# Patient Record
Sex: FEMALE | Race: WHITE | Employment: UNEMPLOYED | RURAL
[De-identification: names, ages, dates, MRNs, and addresses within clinical notes are randomized per-mention and may not be internally consistent; named-entity substitution may affect disease eponyms.]

---

## 2017-01-12 ENCOUNTER — OFFICE VISIT (OUTPATIENT)
Dept: FAMILY MEDICINE CLINIC | Age: 47
End: 2017-01-12

## 2017-01-12 VITALS
WEIGHT: 161 LBS | SYSTOLIC BLOOD PRESSURE: 108 MMHG | BODY MASS INDEX: 28.53 KG/M2 | HEART RATE: 103 BPM | DIASTOLIC BLOOD PRESSURE: 79 MMHG | OXYGEN SATURATION: 98 % | RESPIRATION RATE: 16 BRPM | HEIGHT: 63 IN | TEMPERATURE: 98.6 F

## 2017-01-12 DIAGNOSIS — B95.0 GROUP A STREPTOCOCCAL INFECTION: Primary | ICD-10-CM

## 2017-01-12 DIAGNOSIS — M54.2 CHRONIC NECK PAIN: ICD-10-CM

## 2017-01-12 DIAGNOSIS — G89.29 CHRONIC NECK PAIN: ICD-10-CM

## 2017-01-12 DIAGNOSIS — F41.9 ANXIETY: ICD-10-CM

## 2017-01-12 DIAGNOSIS — R11.0 NAUSEA: ICD-10-CM

## 2017-01-12 DIAGNOSIS — J02.9 SORE THROAT: ICD-10-CM

## 2017-01-12 LAB
S PYO AG THROAT QL: POSITIVE
VALID INTERNAL CONTROL?: YES

## 2017-01-12 RX ORDER — ONDANSETRON 4 MG/1
4 TABLET, ORALLY DISINTEGRATING ORAL
Qty: 20 TAB | Refills: 0 | Status: SHIPPED | OUTPATIENT
Start: 2017-01-12 | End: 2017-03-06 | Stop reason: ALTCHOICE

## 2017-01-12 RX ORDER — AZITHROMYCIN 250 MG/1
TABLET, FILM COATED ORAL
Qty: 6 TAB | Refills: 0 | Status: SHIPPED | OUTPATIENT
Start: 2017-01-12 | End: 2017-01-17

## 2017-01-12 RX ORDER — TRAMADOL HYDROCHLORIDE 50 MG/1
TABLET ORAL
Qty: 90 TAB | Refills: 0 | Status: SHIPPED | OUTPATIENT
Start: 2017-01-12 | End: 2017-03-06 | Stop reason: ALTCHOICE

## 2017-01-12 NOTE — PROGRESS NOTES
Identified pt with two pt identifiers(name and ). Chief Complaint   Patient presents with    Neck Pain    Diarrhea    Medication Evaluation     Wellbutrin        There are no preventive care reminders to display for this patient. Wt Readings from Last 3 Encounters:   17 161 lb (73 kg)   16 160 lb (72.6 kg)   16 158 lb 9.6 oz (71.9 kg)     Temp Readings from Last 3 Encounters:   17 98.6 °F (37 °C) (Oral)   16 97.8 °F (36.6 °C) (Oral)   16 98.4 °F (36.9 °C) (Oral)     BP Readings from Last 3 Encounters:   17 108/79   16 122/82   16 107/73     Pulse Readings from Last 3 Encounters:   17 (!) 103   16 78   16 86         Learning Assessment:  :     Learning Assessment 2014   PRIMARY LEARNER Patient Patient   HIGHEST LEVEL OF EDUCATION - PRIMARY LEARNER  DID NOT GRADUATE HIGH SCHOOL DID NOT GRADUATE HIGH SCHOOL   BARRIERS PRIMARY LEARNER - NONE   CO-LEARNER CAREGIVER - No   PRIMARY LANGUAGE ENGLISH ENGLISH   LEARNER PREFERENCE PRIMARY DEMONSTRATION DEMONSTRATION   ANSWERED BY patient patient    RELATIONSHIP SELF SELF       Depression Screening:  :     PHQ 2 / 9, over the last two weeks 2015   Little interest or pleasure in doing things Not at all   Feeling down, depressed or hopeless Not at all   Total Score PHQ 2 0           Abuse Screening:  :     Abuse Screening Questionnaire 1/15/2016 2014   Do you ever feel afraid of your partner? N N   Are you in a relationship with someone who physically or mentally threatens you? N N   Is it safe for you to go home?  Y Y       Coordination of Care Questionnaire:  :     1) Have you been to an emergency room, urgent care clinic since your last visit? no   Hospitalized since your last visit? no             2) Have you seen or consulted any other health care providers outside of [address] [place] [address] since your last visit? no  (Include any pap smears or colon screenings in this section.)    3) Do you have an Advance Directive on file? no  Are you interested in receiving information about Advance Directives? no    Patient is accompanied by self I have received verbal consent from Fermin Cade to discuss any/all medical information while they are present in the room. Reviewed record in preparation for visit and have obtained necessary documentation. Medication reconciliation up to date and corrected with patient at this time.

## 2017-01-12 NOTE — MR AVS SNAPSHOT
Visit Information Date & Time Provider Department Dept. Phone Encounter #  
 1/12/2017  1:15 PM Patrick Mcclelland [phone] [id number] Follow-up Instructions Return if symptoms worsen or fail to improve. Upcoming Health Maintenance Date Due  
 PAP AKA CERVICAL CYTOLOGY 8/31/2018 DTaP/Tdap/Td series (2 - Td) 8/31/2025 Allergies as of 1/12/2017  Review Complete On: 1/12/2017 By: Tutu Hamm LPN Severity Noted Reaction Type Reactions Erythromycin  01/21/2015    Nausea and Vomiting Vancomycin  12/02/2013    Swelling Current Immunizations  Reviewed on 8/31/2015 Name Date Influenza Vaccine (Quad) PF 11/2/2016 Tdap 8/31/2015 12:12 PM  
  
 Not reviewed this visit You Were Diagnosed With   
  
 Codes Comments Group A streptococcal infection    -  Primary ICD-10-CM: B95.0 ICD-9-CM: 041.01 Chronic neck pain     ICD-10-CM: M54.2, G89.29 ICD-9-CM: 723.1, 338.29 Sore throat     ICD-10-CM: J02.9 ICD-9-CM: 762 Anxiety     ICD-10-CM: F41.9 ICD-9-CM: 300.00 Nausea     ICD-10-CM: R11.0 ICD-9-CM: 787.02 Vitals BP Pulse Temp Resp Height(growth percentile) Weight(growth percentile) 108/79 (!) 103 98.6 °F (37 °C) (Oral) 16 5' 3\" (1.6 m) 161 lb (73 kg) LMP SpO2 BMI OB Status Smoking Status 01/09/2010 98% 28.52 kg/m2 Hysterectomy Never Smoker BMI and BSA Data Body Mass Index Body Surface Area 28.52 kg/m 2 1.8 m 2 Preferred Pharmacy Pharmacy Name Phone [facility]/PHARMACY #[id number] - [facility] [phone] [phone] Your Updated Medication List  
  
   
This list is accurate as of: 1/12/17  1:43 PM.  Always use your most recent med list.  
  
  
  
  
 azithromycin 250 mg tablet Commonly known as:  Tash Calloway Take 2 tablets today, then take 1 tablet daily buPROPion  mg XL tablet Commonly known as:  Eugenio Morrison  
 Take 1 Tab by mouth every morning. cyclobenzaprine 10 mg tablet Commonly known as:  FLEXERIL  
TAKE 1 TABLET BY MOUTH EACH NIGHT AT BEDTIME  
  
 gabapentin 800 mg tablet Commonly known as:  NEURONTIN  
TAKE 1 TABLET BY MOUTH THREE TIMES DAILY  
  
 nortriptyline 25 mg capsule Commonly known as:  PAMELOR  
TAKE 1-2 CAPSULES BY MOUTH AT BEDTIME  
  
 ondansetron 4 mg disintegrating tablet Commonly known as:  ZOFRAN ODT Take 1 Tab by mouth every eight (8) hours as needed for Nausea. SUMAtriptan 100 mg tablet Commonly known as:  IMITREX  
TAKE 1 TABLET BY MOUTH ONCE AS NEEDED FOR MIGRAINE FOR UP TO 9 DOSES  
  
 topiramate 100 mg tablet Commonly known as:  TOPAMAX TAKE 1 TAB BY MOUTH TWO (2) TIMES A DAY. traMADol 50 mg tablet Commonly known as:  ULTRAM  
TAKE 1 TABLET EVERY 8 HOURS AS NEEDED FOR PAIN. MAX DAILY AMOUNT 3 TABS Prescriptions Printed Refills  
 traMADol (ULTRAM) 50 mg tablet 0 Sig: TAKE 1 TABLET EVERY 8 HOURS AS NEEDED FOR PAIN. MAX DAILY AMOUNT 3 TABS Class: Print Prescriptions Sent to Pharmacy Refills  
 azithromycin (ZITHROMAX) 250 mg tablet 0 Sig: Take 2 tablets today, then take 1 tablet daily Class: Normal  
 [place] [id number] [address] Ph #: [phone]  
 ondansetron (ZOFRAN ODT) 4 mg disintegrating tablet 0 Sig: Take 1 Tab by mouth every eight (8) hours as needed for Nausea. Class: Normal  
 [facility] [id number] Ph #: [phone] Route: Oral  
  
We Performed the Following AMB POC RAPID STREP A [40922 CPT(R)] Follow-up Instructions Return if symptoms worsen or fail to improve. Patient Instructions Strep Throat: Care Instructions Your Care Instructions Strep throat is a bacterial infection that causes sudden, severe sore throat and fever.  Strep throat, which is caused by bacteria called streptococcus, is treated with antibiotics. Sometimes a strep test is necessary to tell if the sore throat is caused by strep bacteria. Treatment can help ease symptoms and may prevent future problems. Follow-up care is a key part of your treatment and safety. Be sure to make and go to all appointments, and call your doctor if you are having problems. It's also a good idea to know your test results and keep a list of the medicines you take. How can you care for yourself at home? · Take your antibiotics as directed. Do not stop taking them just because you feel better. You need to take the full course of antibiotics. · Strep throat can spread to others until 24 hours after you begin taking antibiotics. During this time, you should avoid contact with other people at work or home, especially infants and children. Do not sneeze or cough on others, and wash your hands often. Keep your drinking glass and eating utensils separate from those of others, and wash these items well in hot, soapy water. · Gargle with warm salt water at least once each hour to help reduce swelling and make your throat feel better. Use 1 teaspoon of salt mixed in 8 fluid ounces of warm water. · Take an over-the-counter pain medication, such as acetaminophen (Tylenol), ibuprofen (Advil, Motrin), or naproxen (Aleve). Read and follow all instructions on the label. · Try an over-the-counter anesthetic throat spray or throat lozenges, which may help relieve throat pain. · Drink plenty of fluids. Fluids may help soothe an irritated throat. Hot fluids, such as tea or soup, may help your throat feel better. · Eat soft solids and drink plenty of clear liquids. Flavored ice pops, ice cream, scrambled eggs, sherbet, and gelatin dessert (such as Jell-O) may also soothe the throat. · Get lots of rest. 
· Do not smoke, and avoid secondhand smoke. If you need help quitting, talk to your doctor about stop-smoking programs and medicines.  These can increase your chances of quitting for good. · Use a vaporizer or humidifier to add moisture to the air in your bedroom. Follow the directions for cleaning the machine. When should you call for help? Call your doctor now or seek immediate medical care if: 
· You have a new or higher fever. · You have a fever with a stiff neck or severe headache. · You have new or worse trouble swallowing. · Your sore throat gets much worse on one side. · Your pain becomes much worse on one side of your throat. Watch closely for changes in your health, and be sure to contact your doctor if: 
· You are not getting better after 2 days (48 hours). · You do not get better as expected. Where can you learn more? Go to http://smooth-connie.info/. Enter K625 in the search box to learn more about \"Strep Throat: Care Instructions. \" Current as of: July 29, 2016 Content Version: 11.1 © 5316-0832 [employer]. Care instructions adapted under license by [employer] (which disclaims liability or warranty for this information). If you have questions about a medical condition or this instruction, always ask your healthcare professional. [place] [address] any warranty or liability for your use of this information. Introducing [facility]! Toi Kidd introduces [employer] patient portal. Now you can access parts of your medical record, email your doctor's office, and request medication refills online. 1. In your internet browser, go to [email or web address] 2. Click on the First Time User? Click Here link in the Sign In box. You will see the New Member Sign Up page. 3. Enter your [employer] Access Code exactly as it appears below. You will not need to use this code after youve completed the sign-up process. If you do not sign up before the expiration date, you must request a new code.  
 
· [employer] Access Code: [email or web address] 
 Expires: 1/31/2017  1:07 PM 
 
4. Enter the last four digits of your Social Security Number (xxxx) and Date of Birth (mm/dd/yyyy) as indicated and click Submit. You will be taken to the next sign-up page. 5. Create a THREAT STREAM ID. This will be your THREAT STREAM login ID and cannot be changed, so think of one that is secure and easy to remember. 6. Create a THREAT STREAM password. You can change your password at any time. 7. Enter your Password Reset Question and Answer. This can be used at a later time if you forget your password. 8. Enter your e-mail address. You will receive e-mail notification when new information is available in [address] 9. Click Sign Up. You can now view and download portions of your medical record. 10. Click the Download Summary menu link to download a portable copy of your medical information. If you have questions, please visit the Frequently Asked Questions section of the THREAT STREAM website. Remember, THREAT STREAM is NOT to be used for urgent needs. For medical emergencies, dial 911. Now available from your iPhone and Android! Please provide this summary of care documentation to your next provider. Your primary care clinician is listed as Makenzie Espinal. If you have any questions after today's visit, please call [phone].

## 2017-01-12 NOTE — PROGRESS NOTES
Subjective:      Lc Rojas is a 42 y.o. female here with complaint of abdominal discomfort, diarrhea, nausea for the past few days. No associated fever, chills, raw/undercooked food ingestion. Daughters currently with strep pharyngitis. She has been taking Zofran for the nausea with some improvement. Inquires about increasing her dose of Wellbutrin or adding another medication. Reports that her anxiety symptoms have been worsening. Feels a sense of being overwhelmed and shaky. A lot has been going on at home and with her 's health. Reports that Wellbutrin has been effective previously but may need a boost. She has previously been on Zoloft therapy. Requests refill for tramadol which is prescribed for chronic neck pain. Current Outpatient Prescriptions   Medication Sig Dispense Refill    cyclobenzaprine (FLEXERIL) 10 mg tablet TAKE 1 TABLET BY MOUTH EACH NIGHT AT BEDTIME 30 Tab 3    buPROPion XL (WELLBUTRIN XL) 300 mg XL tablet Take 1 Tab by mouth every morning. 30 Tab 5    gabapentin (NEURONTIN) 800 mg tablet TAKE 1 TABLET BY MOUTH THREE TIMES DAILY 90 Tab 5    SUMAtriptan (IMITREX) 100 mg tablet TAKE 1 TABLET BY MOUTH ONCE AS NEEDED FOR MIGRAINE FOR UP TO 9 DOSES 9 Tab 5    topiramate (TOPAMAX) 100 mg tablet TAKE 1 TAB BY MOUTH TWO (2) TIMES A DAY. 60 Tab 5    nortriptyline (PAMELOR) 25 mg capsule TAKE 1-2 CAPSULES BY MOUTH AT BEDTIME 60 Cap 5    traMADol (ULTRAM) 50 mg tablet TAKE 1 TABLET EVERY 8 HOURS AS NEEDED FOR PAIN.  MAX DAILY AMOUNT 3 TABS 90 Tab 0       Allergies   Allergen Reactions    Erythromycin Nausea and Vomiting    Vancomycin Swelling       Past Medical History   Diagnosis Date    Cervicalgia     Chronic neck pain 4/3/2013    Migraine with aura, without mention of intractable migraine without mention of status migrainosus     Moderate episode of recurrent major depressive disorder ([facility] [address] 11/2/2016       Social History   Substance Use Topics    Smoking status: Never Smoker    Smokeless tobacco: Never Used    Alcohol use No        Review of Systems  Pertinent items are noted in HPI. Objective:     Visit Vitals    /79    Pulse (!) 103    Temp 98.6 °F (37 °C) (Oral)    Resp 16    Ht 5' 3\" (1.6 m)    Wt 161 lb (73 kg)    LMP 01/09/2010    SpO2 98%    BMI 28.52 kg/m2      General appearance - alert, mildly ill appearing, no acute distress   Eyes - pupils equal and reactive, extraocular eye movements intact  Oropharyngx - mucous membranes moist, pharynx normal without lesions and erythematous  Neck - supple, no significant adenopathy  Chest - clear to auscultation, no wheezes, rales or rhonchi, symmetric air entry, no tachypnea, retractions or cyanosis  Heart - normal rate, regular rhythm, normal S1, S2, no murmurs, rubs, clicks or gallops  Abdomen - soft, nontender, nondistended, no masses or organomegaly  Neurological - alert, oriented, normal speech, no focal findings or movement disorder noted    Assessment/Plan:   Lc Rojas is a 42 y.o. female seen for:     1. Group A streptococcal infection: rapid GAS testing positive. Treat as below. - azithromycin (ZITHROMAX) 250 mg tablet; Take 2 tablets today, then take 1 tablet daily  Dispense: 6 Tab; Refill: 0  - warm salt water gargles, push fluids, OTC analgesic of choice for pain   - change out toothbrush in 48 hours    2. Chronic neck pain: medication refilled. - traMADol (ULTRAM) 50 mg tablet; TAKE 1 TABLET EVERY 8 HOURS AS NEEDED FOR PAIN. MAX DAILY AMOUNT 3 TABS  Dispense: 90 Tab; Refill: 0    3. Sore throat  - AMB POC RAPID STREP A    4. Anxiety: discussed adding another medication, but will research what bests augments Wellbutrin therapy and discuss with her at a later time. She is in agreement with this. 5. Nausea  - ondansetron (ZOFRAN ODT) 4 mg disintegrating tablet; Take 1 Tab by mouth every eight (8) hours as needed for Nausea. Dispense: 20 Tab;  Refill: 0    I have discussed the diagnosis with the patient and the intended plan as seen in the above orders. The patient has received an after-visit summary and questions were answered concerning future plans. I have discussed medication side effects and warnings with the patient as well. Patient verbalizes understanding of plan of care and denies further questions or concerns at this time. Informed patient to return to the office if symptoms worsen or if new symptoms arise. Follow-up Disposition:  Return if symptoms worsen or fail to improve.

## 2017-01-12 NOTE — PATIENT INSTRUCTIONS

## 2017-01-19 ENCOUNTER — TELEPHONE (OUTPATIENT)
Dept: FAMILY MEDICINE CLINIC | Age: 47
End: 2017-01-19

## 2017-01-19 RX ORDER — CITALOPRAM 20 MG/1
20 TABLET, FILM COATED ORAL DAILY
Qty: 30 TAB | Refills: 1 | Status: SHIPPED | OUTPATIENT
Start: 2017-01-19 | End: 2017-03-06 | Stop reason: SINTOL

## 2017-01-19 NOTE — TELEPHONE ENCOUNTER
Pt is requesting a phone call from Dr. Freda Avila regarding a medication that she states was talked about in her visit on Monday 1/16/17

## 2017-01-19 NOTE — TELEPHONE ENCOUNTER
Patient called. Discussed augmenting her Wellbutrin therapy with another medication as her anxiety was not well controlled. My research has shown that SSRIs can be beneficial with Wellbutrin therapy. Will start citalopram 20 mg daily and advised to follow up in 1 month. She verbalizes understanding. Requested Prescriptions     Signed Prescriptions Disp Refills    citalopram (CELEXA) 20 mg tablet 30 Tab 1     Sig: Take 1 Tab by mouth daily.      Authorizing Provider: Jeanne Merida

## 2017-02-07 RX ORDER — NORTRIPTYLINE HYDROCHLORIDE 25 MG/1
CAPSULE ORAL
Qty: 60 CAP | Refills: 5 | Status: SHIPPED | OUTPATIENT
Start: 2017-02-07 | End: 2017-09-24 | Stop reason: SDUPTHER

## 2017-02-21 DIAGNOSIS — G43.509 PERSISTENT MIGRAINE AURA WITHOUT CEREBRAL INFARCTION AND WITHOUT STATUS MIGRAINOSUS, NOT INTRACTABLE: ICD-10-CM

## 2017-02-22 RX ORDER — SUMATRIPTAN 100 MG/1
TABLET, FILM COATED ORAL
Qty: 9 TAB | Refills: 5 | Status: SHIPPED | OUTPATIENT
Start: 2017-02-22 | End: 2018-01-31 | Stop reason: SDUPTHER

## 2017-02-24 RX ORDER — TOPIRAMATE 100 MG/1
TABLET, FILM COATED ORAL
Qty: 60 TAB | Refills: 5 | Status: SHIPPED | OUTPATIENT
Start: 2017-02-24 | End: 2017-10-29 | Stop reason: SDUPTHER

## 2017-03-06 ENCOUNTER — OFFICE VISIT (OUTPATIENT)
Dept: FAMILY MEDICINE CLINIC | Age: 47
End: 2017-03-06

## 2017-03-06 VITALS
TEMPERATURE: 98.5 F | SYSTOLIC BLOOD PRESSURE: 95 MMHG | HEART RATE: 105 BPM | WEIGHT: 164.8 LBS | HEIGHT: 63 IN | DIASTOLIC BLOOD PRESSURE: 66 MMHG | BODY MASS INDEX: 29.2 KG/M2 | RESPIRATION RATE: 20 BRPM | OXYGEN SATURATION: 97 %

## 2017-03-06 DIAGNOSIS — Z91.09 ENVIRONMENTAL ALLERGIES: ICD-10-CM

## 2017-03-06 DIAGNOSIS — N64.4 BREAST PAIN, LEFT: ICD-10-CM

## 2017-03-06 DIAGNOSIS — R51.9 HEADACHE, UNSPECIFIED HEADACHE TYPE: Primary | ICD-10-CM

## 2017-03-06 DIAGNOSIS — H93.13 TINNITUS, BILATERAL: ICD-10-CM

## 2017-03-06 RX ORDER — CETIRIZINE HCL 10 MG
TABLET ORAL
COMMUNITY
End: 2017-03-06 | Stop reason: SDUPTHER

## 2017-03-06 RX ORDER — CETIRIZINE HCL 10 MG
10 TABLET ORAL DAILY
Qty: 30 TAB | Refills: 3 | Status: SHIPPED | OUTPATIENT
Start: 2017-03-06 | End: 2019-05-16 | Stop reason: ALTCHOICE

## 2017-03-06 RX ORDER — BUTALBITAL, ACETAMINOPHEN AND CAFFEINE 50; 325; 40 MG/1; MG/1; MG/1
1 TABLET ORAL
Qty: 30 TAB | Refills: 0 | Status: SHIPPED | OUTPATIENT
Start: 2017-03-06 | End: 2017-03-24 | Stop reason: SDUPTHER

## 2017-03-06 RX ORDER — FLUTICASONE PROPIONATE 50 MCG
SPRAY, SUSPENSION (ML) NASAL
Qty: 1 BOTTLE | Refills: 5 | Status: SHIPPED | OUTPATIENT
Start: 2017-03-06 | End: 2017-11-20

## 2017-03-06 NOTE — PROGRESS NOTES
HISTORY OF PRESENT ILLNESS  Bryon Cross is a 42 y.o. female. HPI  FU frequent headaches. Going thru Imitrex with minimal relief. Taking Topamax for prevention. Sinus drainage x 2 weeks. Also C/O bilat ringing in ears x 2-3 months, constant. Has been to [facility]. Hearing test done in Feb showed some hearing loss and offered hearing aids only. Also left breast pain since last year. mammo done at [facility] last April 26, 2017 - normal.    Review of Systems   HENT: Positive for tinnitus. Neurological: Positive for headaches. All other systems reviewed and are negative. Visit Vitals    BP 95/66 (BP 1 Location: Left arm, BP Patient Position: Sitting)    Pulse (!) 105    Temp 98.5 °F (36.9 °C) (Oral)    Resp 20    Ht 5' 3\" (1.6 m)    Wt 164 lb 12.8 oz (74.8 kg)    LMP 01/09/2010    SpO2 97%    BMI 29.19 kg/m2       Physical Exam   Constitutional: She appears well-developed and well-nourished. HENT:   Right Ear: External ear normal.   Left Ear: External ear normal.   Mouth/Throat: Oropharynx is clear and moist.   Eyes: Conjunctivae are normal.   Neck: Neck supple. Cardiovascular: Normal rate and normal heart sounds. Pulmonary/Chest: Effort normal and breath sounds normal. Left breast exhibits tenderness. Left breast exhibits no mass. Vitals reviewed. ASSESSMENT and PLAN    ICD-10-CM ICD-9-CM    1. Headache, unspecified headache type R51 784.0 butalbital-acetaminophen-caffeine (FIORICET, ESGIC) -40 mg per tablet   2. Tinnitus, bilateral H93.13 388.30    3. Environmental allergies Z91.09 V15.09 cetirizine (ZYRTEC) 10 mg tablet      fluticasone (FLONASE ALLERGY RELIEF) 50 mcg/actuation nasal spray   4. Breast pain, left N64.4 611.71 VAZQUEZ MAMMO LT DX INCL CAD      US BREAST LT COMPLETE 4 QUAD     Treat allergies. RX Fioricet PRN headaches. Order breast imaging.

## 2017-03-06 NOTE — PROGRESS NOTES
Identified pt with two pt identifiers(name and ). Chief Complaint   Patient presents with    Headache     come and go - won't go away     Ringing in Ear     been there a few months and now high pitched and pressure - both ears    Mass     left side lump - had mammogram and it was ok         There are no preventive care reminders to display for this patient. Wt Readings from Last 3 Encounters:   17 164 lb 12.8 oz (74.8 kg)   17 161 lb (73 kg)   16 160 lb (72.6 kg)     Temp Readings from Last 3 Encounters:   17 98.5 °F (36.9 °C) (Oral)   17 98.6 °F (37 °C) (Oral)   16 97.8 °F (36.6 °C) (Oral)     BP Readings from Last 3 Encounters:   17 95/66   17 108/79   16 122/82     Pulse Readings from Last 3 Encounters:   17 (!) 105   17 (!) 103   16 78         Learning Assessment:  :     Learning Assessment 2014   PRIMARY LEARNER Patient Patient   HIGHEST LEVEL OF EDUCATION - PRIMARY LEARNER  DID NOT GRADUATE HIGH SCHOOL DID NOT GRADUATE HIGH SCHOOL   BARRIERS PRIMARY LEARNER - NONE   CO-LEARNER CAREGIVER - No   PRIMARY LANGUAGE ENGLISH ENGLISH   LEARNER PREFERENCE PRIMARY DEMONSTRATION DEMONSTRATION   ANSWERED BY patient patient    RELATIONSHIP SELF SELF       Depression Screening:  :     PHQ 2 / 9, over the last two weeks 2015   Little interest or pleasure in doing things Not at all   Feeling down, depressed or hopeless Not at all   Total Score PHQ 2 0       Fall Risk Assessment:  :     Fall Risk Assessment, last 12 mths 3/6/2017   Able to walk? Yes   Fall in past 12 months? No       Abuse Screening:  :     Abuse Screening Questionnaire 3/6/2017 1/15/2016 2014   Do you ever feel afraid of your partner? N N N   Are you in a relationship with someone who physically or mentally threatens you? N N N   Is it safe for you to go home?  Raisa Monteiro       Coordination of Care Questionnaire:  :     1) Have you been to an emergency room, urgent care clinic since your last visit? no   Hospitalized since your last visit? no             2) Have you seen or consulted any other health care providers outside of [address] [place] [address] since your last visit? Yes ENT 2/2/17  (Include any pap smears or colon screenings in this section.)    3) Do you have an Advance Directive on file? no  Are you interested in receiving information about Advance Directives? no    Patient is accompanied by daughter I have received verbal consent from Juli Amaral to discuss any/all medical information while they are present in the room. Reviewed record in preparation for visit and have obtained necessary documentation. Medication reconciliation up to date and corrected with patient at this time.

## 2017-03-06 NOTE — PATIENT INSTRUCTIONS
Seasonal Allergies: Care Instructions  Your Care Instructions  Allergies occur when your body's defense system (immune system) overreacts to certain substances. The immune system treats a harmless substance as if it were a harmful germ or virus. Many things can cause this to happen. Examples include pollens, medicine, food, dust, animal dander, and mold. Your allergies are seasonal if you have symptoms just at certain times of the year. In that case, you are probably allergic to pollens from certain trees, grasses, or weeds. Allergies can be mild or severe. Over-the-counter allergy medicine may help with some symptoms. Read and follow all instructions on the label. Managing your allergies is an important part of staying healthy. Your doctor may suggest that you have tests to help find the cause of your allergies. When you know what things trigger your symptoms, you can avoid them. This can prevent allergy symptoms and other health problems. In some cases, immunotherapy might help. For this treatment, you get shots or use pills that have a small amount of certain allergens in them. Your body \"gets used to\" the allergen, so you react less to it over time. This kind of treatment may help prevent or reduce some allergy symptoms. Follow-up care is a key part of your treatment and safety. Be sure to make and go to all appointments, and call your doctor if you are having problems. It's also a good idea to know your test results and keep a list of the medicines you take. How can you care for yourself at home? · Be safe with medicines. Take your medicines exactly as prescribed. Call your doctor if you think you are having a problem with your medicine. · During your allergy season, keep windows closed. If you need to use air-conditioning, change or clean all filters every month. Take a shower and change your clothes after you have been outside. · Stay inside when pollen counts are high.  Vacuum once or twice a week. Use a vacuum  with a HEPA filter or a double-thickness filter. When should you call for help? Call 911 anytime you think you may need emergency care. For example, call if:  · You have symptoms of a severe allergic reaction. These may include:  ¨ Sudden raised, red areas (hives) all over your body. ¨ Swelling of the throat, mouth, lips, or tongue. ¨ Trouble breathing. ¨ Passing out (losing consciousness). Or you may feel very lightheaded or suddenly feel weak, confused, or restless. Watch closely for changes in your health, and be sure to contact your doctor if:  · You need help controlling your allergies. · You have questions about allergy testing. · You do not get better as expected. Where can you learn more? Go to http://smooth-connie.info/. Enter J912 in the search box to learn more about \"Seasonal Allergies: Care Instructions. \"  Current as of: February 12, 2016  Content Version: 11.1  © 7995-0834 [employer]. Care instructions adapted under license by [employer] (which disclaims liability or warranty for this information). If you have questions about a medical condition or this instruction, always ask your healthcare professional. [address] any warranty or liability for your use of this information. Plan to wean off Citalopram to see if relieve tinnitus.

## 2017-03-06 NOTE — MR AVS SNAPSHOT
Visit Information Date & Time Provider Department Dept. Phone Encounter #  
 2/5/8942  3:35 PM Cyn Hodges Patrick Juels [phone] [id number] Upcoming Health Maintenance Date Due  
 PAP AKA CERVICAL CYTOLOGY 8/31/2018 DTaP/Tdap/Td series (2 - Td) 8/31/2025 Allergies as of 3/6/2017  Review Complete On: 1/1/9269 By: Cyn Hodges MD  
  
 Severity Noted Reaction Type Reactions Erythromycin  01/21/2015    Nausea and Vomiting Vancomycin  12/02/2013    Swelling Current Immunizations  Reviewed on 8/31/2015 Name Date Influenza Vaccine (Quad) PF 11/2/2016 Tdap 8/31/2015 12:12 PM  
  
 Not reviewed this visit You Were Diagnosed With   
  
 Codes Comments Headache, unspecified headache type    -  Primary ICD-10-CM: R51 ICD-9-CM: 784.0 Tinnitus, bilateral     ICD-10-CM: H93.13 
ICD-9-CM: 388.30 Environmental allergies     ICD-10-CM: Z91.09 
ICD-9-CM: V15.09 Breast pain, left     ICD-10-CM: N64.4 ICD-9-CM: 611.71 Vitals BP Pulse Temp Resp Height(growth percentile) Weight(growth percentile) 95/66 (BP 1 Location: Left arm, BP Patient Position: Sitting) (!) 105 98.5 °F (36.9 °C) (Oral) 20 5' 3\" (1.6 m) 164 lb 12.8 oz (74.8 kg) LMP SpO2 BMI OB Status Smoking Status 01/09/2010 97% 29.19 kg/m2 Hysterectomy Never Smoker Vitals History BMI and BSA Data Body Mass Index Body Surface Area  
 29.19 kg/m 2 1.82 m 2 Preferred Pharmacy Pharmacy Name Phone [id number] [place] - [address] [facility] [address] [facility] [address] [phone] Your Updated Medication List  
  
   
This list is accurate as of: 3/6/17  5:03 PM.  Always use your most recent med list.  
  
  
  
  
 buPROPion  mg XL tablet Commonly known as:  Mary Salm Take 1 Tab by mouth every morning. butalbital-acetaminophen-caffeine -40 mg per tablet Commonly known as:  Doug Guajardo  
 Take 1 Tab by mouth every six (6) hours as needed for Headache. Max Daily Amount: 4 Tabs. cetirizine 10 mg tablet Commonly known as:  ZyrTEC Take 1 Tab by mouth daily. cyclobenzaprine 10 mg tablet Commonly known as:  FLEXERIL  
TAKE 1 TABLET BY MOUTH EACH NIGHT AT BEDTIME  
  
 fluticasone 50 mcg/actuation nasal spray Commonly known as:  FLONASE ALLERGY RELIEF  
2 sprays per nostril once a day  
  
 gabapentin 800 mg tablet Commonly known as:  NEURONTIN  
TAKE 1 TABLET BY MOUTH THREE TIMES DAILY  
  
 nortriptyline 25 mg capsule Commonly known as:  PAMELOR  
TAKE 1-2 CAPSULES BY MOUTH AT BEDTIME  
  
 SUMAtriptan 100 mg tablet Commonly known as:  IMITREX  
TAKE 1 TABLET BY MOUTH ONCE AS NEEDED FOR MIGRAINE FOR UP TO 9 DOSES  
  
 topiramate 100 mg tablet Commonly known as:  TOPAMAX TAKE 1 TAB BY MOUTH TWO (2) TIMES A DAY. Prescriptions Printed Refills  
 butalbital-acetaminophen-caffeine (FIORICET, ESGIC) -40 mg per tablet 0 Sig: Take 1 Tab by mouth every six (6) hours as needed for Headache. Max Daily Amount: 4 Tabs. Class: Print Route: Oral  
  
Prescriptions Sent to Pharmacy Refills  
 cetirizine (ZYRTEC) 10 mg tablet 3 Sig: Take 1 Tab by mouth daily. Class: Normal  
 Pharmacy: [facility] #[id number] - [place] [address] Ph #: [phone] Route: Oral  
 fluticasone (FLONASE ALLERGY RELIEF) 50 mcg/actuation nasal spray 5 Si sprays per nostril once a day Class: Normal  
 Pharmacy: [facility]/pharmacy #[id number] - [place] [address] Ph #: [phone] To-Do List   
 2017 Imaging:  VAZQUEZ MAMMO LT DX INCL CAD   
  
 2017 Imaging:  US BREAST LT COMPLETE 4 QUAD Patient Instructions Seasonal Allergies: Care Instructions Your Care Instructions Allergies occur when your body's defense system (immune system) overreacts to certain substances. The immune system treats a harmless substance as if it were a harmful germ or virus. Many things can cause this to happen. Examples include pollens, medicine, food, dust, animal dander, and mold. Your allergies are seasonal if you have symptoms just at certain times of the year. In that case, you are probably allergic to pollens from certain trees, grasses, or weeds. Allergies can be mild or severe. Over-the-counter allergy medicine may help with some symptoms. Read and follow all instructions on the label. Managing your allergies is an important part of staying healthy. Your doctor may suggest that you have tests to help find the cause of your allergies. When you know what things trigger your symptoms, you can avoid them. This can prevent allergy symptoms and other health problems. In some cases, immunotherapy might help. For this treatment, you get shots or use pills that have a small amount of certain allergens in them. Your body \"gets used to\" the allergen, so you react less to it over time. This kind of treatment may help prevent or reduce some allergy symptoms. Follow-up care is a key part of your treatment and safety. Be sure to make and go to all appointments, and call your doctor if you are having problems. It's also a good idea to know your test results and keep a list of the medicines you take. How can you care for yourself at home? · Be safe with medicines. Take your medicines exactly as prescribed. Call your doctor if you think you are having a problem with your medicine. · During your allergy season, keep windows closed. If you need to use air-conditioning, change or clean all filters every month. Take a shower and change your clothes after you have been outside. · Stay inside when pollen counts are high. Vacuum once or twice a week. Use a vacuum  with a HEPA filter or a double-thickness filter. When should you call for help? Call 911 anytime you think you may need emergency care. For example, call if: 
· You have symptoms of a severe allergic reaction. These may include: 
¨ Sudden raised, red areas (hives) all over your body. ¨ Swelling of the throat, mouth, lips, or tongue. ¨ Trouble breathing. ¨ Passing out (losing consciousness). Or you may feel very lightheaded or suddenly feel weak, confused, or restless. Watch closely for changes in your health, and be sure to contact your doctor if: 
· You need help controlling your allergies. · You have questions about allergy testing. · You do not get better as expected. Where can you learn more? Go to http://smoothGladitoodconnie.info/. Enter J912 in the search box to learn more about \"Seasonal Allergies: Care Instructions. \" Current as of: February 12, 2016 Content Version: 11.1 © 2476-6172 [employer]. Care instructions adapted under license by [employer] (which disclaims liability or warranty for this information). If you have questions about a medical condition or this instruction, always ask your healthcare professional. [place] [address] any warranty or liability for your use of this information. Plan to wean off Citalopram to see if relieve tinnitus. Introducing [facility]! [address] introduces [email or web address] patient portal. Now you can access parts of your medical record, email your doctor's office, and request medication refills online. 1. In your internet browser, go to [email or web address] 2. Click on the First Time User? Click Here link in the Sign In box. You will see the New Member Sign Up page. 3. Enter your [email or web address] Access Code exactly as it appears below. You will not need to use this code after youve completed the sign-up process. If you do not sign up before the expiration date, you must request a new code. · [email or web address] Access Code: [email or web address] Expires: 6/4/2017  4:55 PM 
 
 4. Enter the last four digits of your Social Security Number (xxxx) and Date of Birth (mm/dd/yyyy) as indicated and click Submit. You will be taken to the next sign-up page. 5. Create a Narrato ID. This will be your Narrato login ID and cannot be changed, so think of one that is secure and easy to remember. 6. Create a Narrato password. You can change your password at any time. 7. Enter your Password Reset Question and Answer. This can be used at a later time if you forget your password. 8. Enter your e-mail address. You will receive e-mail notification when new information is available in [address] 9. Click Sign Up. You can now view and download portions of your medical record. 10. Click the Download Summary menu link to download a portable copy of your medical information. If you have questions, please visit the Frequently Asked Questions section of the Narrato website. Remember, Narrato is NOT to be used for urgent needs. For medical emergencies, dial 911. Now available from your iPhone and Android! Please provide this summary of care documentation to your next provider. Your primary care clinician is listed as Brandi Huffman. If you have any questions after today's visit, please call [phone].

## 2017-03-09 ENCOUNTER — HOSPITAL ENCOUNTER (OUTPATIENT)
Dept: MAMMOGRAPHY | Age: 47
Discharge: HOME | End: 2017-03-09
Attending: FAMILY MEDICINE
Payer: MEDICAID

## 2017-03-09 DIAGNOSIS — N64.4 BREAST PAIN, LEFT: ICD-10-CM

## 2017-03-09 PROCEDURE — 77067 SCR MAMMO BI INCL CAD: CPT

## 2017-03-14 ENCOUNTER — APPOINTMENT (OUTPATIENT)
Dept: CT IMAGING | Age: 47
End: 2017-03-14
Attending: EMERGENCY MEDICINE
Payer: MEDICAID

## 2017-03-14 ENCOUNTER — HOSPITAL ENCOUNTER (EMERGENCY)
Age: 47
Discharge: HOME | End: 2017-03-14
Attending: EMERGENCY MEDICINE
Payer: MEDICAID

## 2017-03-14 VITALS
HEIGHT: 63 IN | TEMPERATURE: 97.6 F | HEART RATE: 72 BPM | DIASTOLIC BLOOD PRESSURE: 66 MMHG | SYSTOLIC BLOOD PRESSURE: 103 MMHG | OXYGEN SATURATION: 100 % | WEIGHT: 164 LBS | BODY MASS INDEX: 29.06 KG/M2 | RESPIRATION RATE: 17 BRPM

## 2017-03-14 DIAGNOSIS — G43.809 OTHER MIGRAINE WITHOUT STATUS MIGRAINOSUS, NOT INTRACTABLE: Primary | ICD-10-CM

## 2017-03-14 DIAGNOSIS — M62.830 SPASM OF MUSCLE, BACK: ICD-10-CM

## 2017-03-14 PROCEDURE — 96361 HYDRATE IV INFUSION ADD-ON: CPT

## 2017-03-14 PROCEDURE — 96375 TX/PRO/DX INJ NEW DRUG ADDON: CPT

## 2017-03-14 PROCEDURE — 96374 THER/PROPH/DIAG INJ IV PUSH: CPT

## 2017-03-14 PROCEDURE — 74011250636 HC RX REV CODE- 250/636: Performed by: EMERGENCY MEDICINE

## 2017-03-14 PROCEDURE — 70450 CT HEAD/BRAIN W/O DYE: CPT

## 2017-03-14 PROCEDURE — 99283 EMERGENCY DEPT VISIT LOW MDM: CPT

## 2017-03-14 RX ORDER — DEXAMETHASONE SODIUM PHOSPHATE 4 MG/ML
10 INJECTION, SOLUTION INTRA-ARTICULAR; INTRALESIONAL; INTRAMUSCULAR; INTRAVENOUS; SOFT TISSUE
Status: COMPLETED | OUTPATIENT
Start: 2017-03-14 | End: 2017-03-14

## 2017-03-14 RX ORDER — DIPHENHYDRAMINE HYDROCHLORIDE 50 MG/ML
25 INJECTION, SOLUTION INTRAMUSCULAR; INTRAVENOUS
Status: COMPLETED | OUTPATIENT
Start: 2017-03-14 | End: 2017-03-14

## 2017-03-14 RX ORDER — PROCHLORPERAZINE EDISYLATE 5 MG/ML
10 INJECTION INTRAMUSCULAR; INTRAVENOUS
Status: COMPLETED | OUTPATIENT
Start: 2017-03-14 | End: 2017-03-14

## 2017-03-14 RX ORDER — KETOROLAC TROMETHAMINE 30 MG/ML
30 INJECTION, SOLUTION INTRAMUSCULAR; INTRAVENOUS
Status: COMPLETED | OUTPATIENT
Start: 2017-03-14 | End: 2017-03-14

## 2017-03-14 RX ADMIN — KETOROLAC TROMETHAMINE 30 MG: 30 INJECTION, SOLUTION INTRAMUSCULAR at 15:54

## 2017-03-14 RX ADMIN — DEXAMETHASONE SODIUM PHOSPHATE 10 MG: 4 INJECTION, SOLUTION INTRAMUSCULAR; INTRAVENOUS at 15:24

## 2017-03-14 RX ADMIN — PROCHLORPERAZINE EDISYLATE 10 MG: 5 INJECTION INTRAMUSCULAR; INTRAVENOUS at 15:27

## 2017-03-14 RX ADMIN — SODIUM CHLORIDE 2000 ML: 900 INJECTION, SOLUTION INTRAVENOUS at 15:25

## 2017-03-14 RX ADMIN — DIPHENHYDRAMINE HYDROCHLORIDE 25 MG: 50 INJECTION, SOLUTION INTRAMUSCULAR; INTRAVENOUS at 15:27

## 2017-03-14 NOTE — PROGRESS NOTES
3:40 PM  Change of shift. Care of patient taken over from Dr Jaclyn Carnes; H&P reviewed, handoff complete. Pt just given migraine cocktail/ awaiting HA resoution/ pt has a Neurologist she see's;     4:22 PM 'HA better now'; pt sleeping/ awakens to voice/ agrees w/ Neurology follow-up; 'has meds at home'; will discharge once IVF completed; Fallon Pearson's  results have been reviewed with her. She has been counseled regarding her diagnosis. She verbally conveys understanding and agreement of the signs, symptoms, diagnosis, treatment and prognosis and additionally agrees to Call/ Arrange follow up as recommended with Dr. Emilia Darnell MD in 24 - 48 hours. She also agrees with the care-plan and conveys that all of her questions have been answered. I have also put together some discharge instructions for her that include: 1) educational information regarding their diagnosis, 2) how to care for their diagnosis at home, as well a 3) list of reasons why they would want to return to the ED prior to their follow-up appointment, should their condition change or for concerns.

## 2017-03-14 NOTE — ED PROVIDER NOTES
HPI Comments: 42 y.o. female with past medical history significant for migraine, cervicalgia, and chronic neck pain who presents with chief complaint of HA. For the past 3 days, patient has complained of worsening HA with associated sensitivity to lights and sounds. 2 days ago, patient also developed nausea/vomiting. While HA is generalized, pain presents more acutely at the \"sides\" -- \"it's like this pulsating, explosive pain. \"  Patient mentions h/o migraine HA's, but \"has never had it like this before. \"  Patient has had mild relief with Ibuprofen and Zofran -- denies any improvement with Imitrex. Patient last took Ibuprofen and Zofran early this morning. Patient was recently seen by her PCP 8 days ago for constant, mild dull HA -- \"they thought it was allergies. \"  Patient had been taking Zyrtec with some relief. Patient additionally mentions intermittent ear pain and tinnitus for the past month -- reports recent ENT f/u. Patient denies any fever or decreased visual acuity. There are no other acute medical concerns at this time. Social hx: denies tobacco smoking; denies EtOH  PCP: Emilia Darnell MD    Note written by Mellisa Berry, as dictated by Cameron Burns MD 2:21 PM    The history is provided by the patient.         Past Medical History:   Diagnosis Date    Cervicalgia     Chronic neck pain 4/3/2013    Migraine with aura, without mention of intractable migraine without mention of status migrainosus     Moderate episode of recurrent major depressive disorder ([facility] [address] 11/2/2016       Past Surgical History:   Procedure Laterality Date    HX ABDOMINAL LAPAROSCOPY      Adhesions found     HX APPENDECTOMY      HX CERVICAL FUSION  2006    HX HYSTERECTOMY  01/2010         Family History:   Problem Relation Age of Onset    Hypertension Father        Social History     Social History    Marital status:      Spouse name: N/A    Number of children: N/A    Years of education: N/A Occupational History    Not on file. Social History Main Topics    Smoking status: Never Smoker    Smokeless tobacco: Never Used    Alcohol use No    Drug use: No    Sexual activity: Yes     Partners: Male     Birth control/ protection: Surgical     Other Topics Concern    Not on file     Social History Narrative         ALLERGIES: Erythromycin and Vancomycin    Review of Systems   Constitutional: Negative for chills and fever. HENT: Positive for ear pain and tinnitus. Negative for sore throat. Eyes: Positive for photophobia. Negative for visual disturbance. Respiratory: Negative for shortness of breath. Cardiovascular: Negative for chest pain. Gastrointestinal: Positive for nausea and vomiting. Genitourinary: Negative for dysuria. Musculoskeletal: Negative for back pain. Skin: Negative for rash. Neurological: Positive for headaches. All other systems reviewed and are negative. Vitals:    03/14/17 1342   BP: 108/64   Pulse: 88   Resp: 16   Temp: 97.6 °F (36.4 °C)   SpO2: 100%   Weight: 74.4 kg (164 lb)   Height: 5' 3\" (1.6 m)            Physical Exam     Nursing note and vitals reviewed. Constitutional: appears well-developed and well-nourished. No distress. HENT:   Head: Normocephalic and atraumatic. Sclera anicteric  Nose: No rhinorrhea  Mouth/Throat: Oropharynx is clear and moist. Pharynx normal  Eyes: Conjunctivae are normal. Pupils are equal, round, and reactive to light. Right eye exhibits no discharge. Left eye exhibits no discharge. No scleral icterus. Neck: Painless normal range of motion. Supple. NO MENINGISMUS. Cardiovascular: Normal rate, regular rhythm, normal heart sounds and intact distal pulses. Exam reveals no gallop and no friction rub. No murmur heard. Pulmonary/Chest: Effort normal and breath sounds normal. No respiratory distress. no wheezes. no rales. Abdominal: Soft. Bowel sounds are normal. Exhibits no distension and no mass.  No tenderness. No guarding. Musculoskeletal: Normal range of motion. no tenderness. No edema  Lymphadenopathy:   No cervical adenopathy. Neurological:  Alert and oriented to person, place, and time. Coordination normal. CN 2-12 intact. Moving all extremities. Skin: Skin is warm and dry. No rash noted. No pallor. MDM  Number of Diagnoses or Management Options  Diagnosis management comments: 22-year-old female here with gradual onset of headache with associated phonophobia, photophobia, nausea and vomiting. Afebrile. Neurologically appears intact. Headache is somewhat different than her usual headaches. Does not sound like a subarachnoid hemorrhage based upon her previous headaches being equally as severe but not as long in duration. It was also a gradual onset of a headache. We'll check a CT, give Compazine, Benadryl and dexamethasone. if Head CT negative, we'll give Toradol. ED Course       Procedures    3:37 PM  Updated pt on Head ct results. Pt just received medications so will need to be reassessed. Signed out pt at bedside with Dr. Cheyanne Billy. Pt aware of the plan of care. Paulino Severe, MD    No results found for this or any previous visit (from the past 24 hour(s)). Ct Head Wo Cont    Result Date: 3/14/2017  EXAM:  CT HEAD WO CONT INDICATION:   headache x 3 days different from typical migraine COMPARISON: None. TECHNIQUE: Unenhanced CT of the head was performed using 5 mm images. Brain and bone windows were generated. CT dose reduction was achieved through use of a standardized protocol tailored for this examination and automatic exposure control for dose modulation. FINDINGS: The ventricles and sulci are normal in size, shape and configuration and midline. There is no significant white matter disease. There is no intracranial hemorrhage, extra-axial collection, mass, mass effect or midline shift. The basilar cisterns are open. No acute infarct is identified.  The bone windows demonstrate no abnormalities. There is minimal mucoperiosteal thickening in the frontal and ethmoid air cells. .     IMPRESSION: No acute intracranial abnormality.

## 2017-03-14 NOTE — DISCHARGE INSTRUCTIONS
Migraine Headache: Care Instructions  Your Care Instructions  Migraines are painful, throbbing headaches that often start on one side of the head. They may cause nausea and vomiting and make you sensitive to light, sound, or smell. Without treatment, migraines can last from 4 hours to a few days. Medicines can help prevent migraines or stop them after they have started. Your doctor can help you find which ones work best for you. Follow-up care is a key part of your treatment and safety. Be sure to make and go to all appointments, and call your doctor if you are having problems. It's also a good idea to know your test results and keep a list of the medicines you take. How can you care for yourself at home? · Do not drive if you have taken a prescription pain medicine. · Rest in a quiet, dark room until your headache is gone. Close your eyes, and try to relax or go to sleep. Don't watch TV or read. · Put a cold, moist cloth or cold pack on the painful area for 10 to 20 minutes at a time. Put a thin cloth between the cold pack and your skin. · Use a warm, moist towel or a heating pad set on low to relax tight shoulder and neck muscles. · Have someone gently massage your neck and shoulders. · Take your medicines exactly as prescribed. Call your doctor if you think you are having a problem with your medicine. You will get more details on the specific medicines your doctor prescribes. · Be careful not to take pain medicine more often than the instructions allow. You could get worse or more frequent headaches when the medicine wears off. To prevent migraines  · Keep a headache diary so you can figure out what triggers your headaches. Avoiding triggers may help you prevent headaches. Record when each headache began, how long it lasted, and what the pain was like.  (Was it throbbing, aching, stabbing, or dull?) Write down any other symptoms you had with the headache, such as nausea, flashing lights or dark spots, or sensitivity to bright light or loud noise. Note if the headache occurred near your period. List anything that might have triggered the headache. Triggers may include certain foods (chocolate, cheese, wine) or odors, smoke, bright light, stress, or lack of sleep. · If your doctor has prescribed medicine for your migraines, take it as directed. You may have medicine that you take only when you get a migraine and medicine that you take all the time to help prevent migraines. ¨ If your doctor has prescribed medicine for when you get a headache, take it at the first sign of a migraine, unless your doctor has given you other instructions. ¨ If your doctor has prescribed medicine to prevent migraines, take it exactly as prescribed. Call your doctor if you think you are having a problem with your medicine. · Find healthy ways to deal with stress. Migraines are most common during or right after stressful times. Take time to relax before and after you do something that has caused a migraine in the past.  · Try to keep your muscles relaxed by keeping good posture. Check your jaw, face, neck, and shoulder muscles for tension. Try to relax them. When you sit at a desk, change positions often. And make sure to stretch for 30 seconds each hour. · Get plenty of sleep and exercise. · Eat meals on a regular schedule. Avoid foods and drinks that often trigger migraines. These include chocolate, alcohol (especially red wine and port), aspartame, monosodium glutamate (MSG), and some additives found in foods (such as hot dogs, rodney, cold cuts, aged cheeses, and pickled foods). · Limit caffeine. Don't drink too much coffee, tea, or soda. But don't quit caffeine suddenly. That can also give you migraines. · Do not smoke or allow others to smoke around you. If you need help quitting, talk to your doctor about stop-smoking programs and medicines. These can increase your chances of quitting for good.   · If you are taking birth control pills or hormone therapy, talk to your doctor about whether they are triggering your migraines. When should you call for help? Call 911 anytime you think you may need emergency care. For example, call if:  · You have signs of a stroke. These may include:  ¨ Sudden numbness, paralysis, or weakness in your face, arm, or leg, especially on only one side of your body. ¨ Sudden vision changes. ¨ Sudden trouble speaking. ¨ Sudden confusion or trouble understanding simple statements. ¨ Sudden problems with walking or balance. ¨ A sudden, severe headache that is different from past headaches. Call your doctor now or seek immediate medical care if:  · You have new or worse nausea and vomiting. · You have a new or higher fever. · Your headache gets much worse. Watch closely for changes in your health, and be sure to contact your doctor if:  · You are not getting better after 2 days (48 hours). Where can you learn more? Go to http://smooth-connie.info/. Enter L644 in the search box to learn more about \"Migraine Headache: Care Instructions. \"  Current as of: February 19, 2016  Content Version: 11.1  © 1381-2724 [employer]. Care instructions adapted under license by [employer] (which disclaims liability or warranty for this information). If you have questions about a medical condition or this instruction, always ask your healthcare professional. [address] any warranty or liability for your use of this information. We hope that we have addressed all of your medical concerns. The examination and treatment you received in the Emergency Department were for an emergent problem and were not intended as complete care. It is important that you follow up with your healthcare provider(s) for ongoing care.  If your symptoms worsen or do not improve as expected, and you are unable to reach your usual health care provider(s), you should return to the Emergency Department. Today's healthcare is undergoing tremendous change, and patient satisfaction surveys are one of the many tools to assess the quality of medical care. You may receive a survey from the [employer] regarding your experience in the Emergency Department. I hope that your experience has been completely positive, particularly the medical care that I provided. As such, please participate in the survey; anything less than excellent does not meet my expectations or intentions. [facility] and [facility] participate in nationally recognized quality of care measures. If your blood pressure is greater than 120/80, as reported below, we urge that you seek medical care to address the potential of high blood pressure, commonly known as hypertension. Hypertension can be hereditary or can be caused by certain medical conditions, pain, stress, or \"white coat syndrome. \"       Please make an appointment with your health care provider(s) for follow up of your Emergency Department visit. VITALS:   Patient Vitals for the past 8 hrs:   Temp Pulse Resp BP SpO2   03/14/17 1342 97.6 °F (36.4 °C) 88 16 108/64 100 %          Thank you for allowing us to provide you with medical care today. We realize that you have many choices for your emergency care needs. Please choose us in the future for any continued health care needs. Cathren Friday Svet[address], [address]: [phone]            No results found for this or any previous visit (from the past 24 hour(s)). Ct Head Wo Cont    Result Date: 3/14/2017  EXAM:  CT HEAD WO CONT INDICATION:   headache x 3 days different from typical migraine COMPARISON: None. TECHNIQUE: Unenhanced CT of the head was performed using 5 mm images. Brain and bone windows were generated.   CT dose reduction was achieved through use of a standardized protocol tailored for this examination and automatic exposure control for dose modulation. FINDINGS: The ventricles and sulci are normal in size, shape and configuration and midline. There is no significant white matter disease. There is no intracranial hemorrhage, extra-axial collection, mass, mass effect or midline shift. The basilar cisterns are open. No acute infarct is identified. The bone windows demonstrate no abnormalities. There is minimal mucoperiosteal thickening in the frontal and ethmoid air cells. .     IMPRESSION: No acute intracranial abnormality.

## 2017-03-14 NOTE — ED TRIAGE NOTES
Pt c/o day 3 HA with vomiting. Pt states she took her migraine regimen without relief and states this feels different than her normal HAs. \"I feel like there is a vice around my head. \" Bilateral ear pressure. Afebrile. +nasal congestion.

## 2017-03-14 NOTE — ED NOTES
Pt is medicated as ordered. Provider at bedside to update the patient on Head CT results. Family remains at bedside. Will continue to monitor patient.

## 2017-03-14 NOTE — ED NOTES
Pt given discharge papers by provider. Opportunity given to ask questions. Pt ambulated to waiting area with family member.

## 2017-03-14 NOTE — ED NOTES
Pt states she wanted to leave before the second liter of fluids was complete. MD aware and pt discharged by RN. Caitlin 300ml of second liter infused.

## 2017-03-14 NOTE — Clinical Note
Thank you for allowing us to provide you with medical care today. We realize that you have many choices for your emergency care needs. We thank you for choosing [facility]. Please choose us in the future for any continued health care needs. We hope we addressed all of your medical concerns. We strive to provide excellent quality care in the Emergency Department. Anything less than excellent does not meet our expectations. The exam and treatment you received in the Emergency Department  were for an emergent problem and are not intended as complete care. It is important that you follow up with a doctor, nurse practitioner, or [address] [place] [address] assistant for ongoing care. If your symptoms worsen or you do not improve as expected and you are un able to reach your usual health care provider, you should return to the Emergency Department. We are available 24 hours a day. Take this sheet with you when you go to your follow-up visit. If you have any problem arranging the follow-up visit, co ntact the Emergency Department immediately. Make an appointment your family doctor for follow up of this visit. Return to the ER if you are unable to be seen in a timely manner.

## 2017-03-15 RX ORDER — CYCLOBENZAPRINE HCL 10 MG
TABLET ORAL
Qty: 30 TAB | Refills: 3 | Status: SHIPPED | OUTPATIENT
Start: 2017-03-15 | End: 2018-05-09 | Stop reason: SDUPTHER

## 2017-03-24 DIAGNOSIS — R51.9 HEADACHE, UNSPECIFIED HEADACHE TYPE: ICD-10-CM

## 2017-03-24 RX ORDER — CITALOPRAM 20 MG/1
TABLET, FILM COATED ORAL
Qty: 30 TAB | Refills: 1 | Status: SHIPPED | OUTPATIENT
Start: 2017-03-24 | End: 2017-11-20

## 2017-03-24 RX ORDER — BUTALBITAL, ACETAMINOPHEN AND CAFFEINE 50; 325; 40 MG/1; MG/1; MG/1
TABLET ORAL
Qty: 30 TAB | Refills: 0 | Status: SHIPPED | OUTPATIENT
Start: 2017-03-24 | End: 2017-06-06 | Stop reason: SDUPTHER

## 2017-03-31 DIAGNOSIS — M54.2 CHRONIC NECK PAIN: ICD-10-CM

## 2017-03-31 DIAGNOSIS — G89.29 CHRONIC NECK PAIN: ICD-10-CM

## 2017-03-31 RX ORDER — TRAMADOL HYDROCHLORIDE 50 MG/1
TABLET ORAL
Qty: 90 TAB | Refills: 0 | Status: SHIPPED | OUTPATIENT
Start: 2017-03-31 | End: 2017-11-20

## 2017-04-15 DIAGNOSIS — F33.1 MODERATE EPISODE OF RECURRENT MAJOR DEPRESSIVE DISORDER (HCC): ICD-10-CM

## 2017-04-15 DIAGNOSIS — F41.9 ANXIETY: ICD-10-CM

## 2017-04-17 RX ORDER — BUPROPION HYDROCHLORIDE 300 MG/1
TABLET ORAL
Qty: 30 TAB | Refills: 4 | Status: SHIPPED | OUTPATIENT
Start: 2017-04-17 | End: 2018-05-09 | Stop reason: ALTCHOICE

## 2017-04-19 RX ORDER — GABAPENTIN 800 MG/1
TABLET ORAL
Qty: 90 TAB | Refills: 4 | Status: SHIPPED | OUTPATIENT
Start: 2017-04-19 | End: 2018-03-02 | Stop reason: SDUPTHER

## 2017-06-05 ENCOUNTER — OFFICE VISIT (OUTPATIENT)
Dept: FAMILY MEDICINE CLINIC | Age: 47
End: 2017-06-05

## 2017-06-05 VITALS
WEIGHT: 160 LBS | DIASTOLIC BLOOD PRESSURE: 72 MMHG | SYSTOLIC BLOOD PRESSURE: 120 MMHG | HEART RATE: 110 BPM | OXYGEN SATURATION: 99 % | BODY MASS INDEX: 28.35 KG/M2 | RESPIRATION RATE: 16 BRPM | HEIGHT: 63 IN | TEMPERATURE: 98.6 F

## 2017-06-05 DIAGNOSIS — H65.111 ACUTE MUCOID OTITIS MEDIA OF RIGHT EAR: Primary | ICD-10-CM

## 2017-06-05 RX ORDER — AMOXICILLIN AND CLAVULANATE POTASSIUM 875; 125 MG/1; MG/1
1 TABLET, FILM COATED ORAL 2 TIMES DAILY
Qty: 20 TAB | Refills: 0 | Status: SHIPPED | OUTPATIENT
Start: 2017-06-05 | End: 2017-06-15

## 2017-06-05 RX ORDER — METHYLPREDNISOLONE 4 MG/1
TABLET ORAL
Qty: 1 DOSE PACK | Refills: 0 | Status: SHIPPED | OUTPATIENT
Start: 2017-06-05 | End: 2017-10-03 | Stop reason: ALTCHOICE

## 2017-06-05 NOTE — MR AVS SNAPSHOT
Visit Information Date & Time Provider Department Dept. Phone Encounter #  
 6/5/2017  1:15 PM Riaan Arambula NP [place] [facility] [phone] [id number] Follow-up Instructions Return if symptoms worsen or fail to improve. Upcoming Health Maintenance Date Due INFLUENZA AGE 9 TO ADULT 8/1/2017 PAP AKA CERVICAL CYTOLOGY 8/31/2018 DTaP/Tdap/Td series (2 - Td) 8/31/2025 Allergies as of 6/5/2017  Review Complete On: 6/5/2017 By: Riana Arambula NP Severity Noted Reaction Type Reactions Erythromycin  01/21/2015    Nausea and Vomiting Vancomycin  12/02/2013    Swelling Current Immunizations  Reviewed on 8/31/2015 Name Date Influenza Vaccine (Quad) PF 11/2/2016 Tdap 8/31/2015 12:12 PM  
  
 Not reviewed this visit You Were Diagnosed With   
  
 Codes Comments Acute mucoid otitis media of right ear    -  Primary ICD-10-CM: H65.111 ICD-9-CM: 381.02 Vitals BP Pulse Temp Resp Height(growth percentile) Weight(growth percentile) 120/72 (!) 110 98.6 °F (37 °C) (Oral) 16 5' 3\" (1.6 m) 160 lb (72.6 kg) LMP SpO2 BMI OB Status Smoking Status 01/09/2010 99% 28.34 kg/m2 Hysterectomy Never Smoker BMI and BSA Data Body Mass Index Body Surface Area  
 28.34 kg/m 2 1.8 m 2 Preferred Pharmacy Pharmacy Name Phone [facility]/PHARMACY #[id number] - [place] - [address] [phone] Your Updated Medication List  
  
   
This list is accurate as of: 6/5/17  1:40 PM.  Always use your most recent med list.  
  
  
  
  
 amoxicillin-clavulanate 875-125 mg per tablet Commonly known as:  AUGMENTIN Take 1 Tab by mouth two (2) times a day for 10 days. buPROPion  mg XL tablet Commonly known as:  WELLBUTRIN XL  
TAKE 1 TAB BY MOUTH EVERY MORNING.  
  
 butalbital-acetaminophen-caffeine -40 mg per tablet Commonly known as:  Joano Ney  
 TAKE 1 TABLET BY MOUTH EVERY 6 HOURS AS NEEDED FOR HEADACHE. MAX DAILY AMOUNT 4 TABS  
  
 cetirizine 10 mg tablet Commonly known as:  ZyrTEC Take 1 Tab by mouth daily. citalopram 20 mg tablet Commonly known as:  CELEXA  
TAKE 1 TAB BY MOUTH DAILY. cyclobenzaprine 10 mg tablet Commonly known as:  FLEXERIL  
TAKE 1 TABLET BY MOUTH EACH NIGHT AT BEDTIME  
  
 fluticasone 50 mcg/actuation nasal spray Commonly known as:  FLONASE ALLERGY RELIEF  
2 sprays per nostril once a day  
  
 gabapentin 800 mg tablet Commonly known as:  NEURONTIN  
TAKE 1 TABLET BY MOUTH THREE TIMES DAILY  
  
 methylPREDNISolone 4 mg tablet Commonly known as:  Brand Saint Take as prescribed. nortriptyline 25 mg capsule Commonly known as:  PAMELOR  
TAKE 1-2 CAPSULES BY MOUTH AT BEDTIME  
  
 SUMAtriptan 100 mg tablet Commonly known as:  IMITREX  
TAKE 1 TABLET BY MOUTH ONCE AS NEEDED FOR MIGRAINE FOR UP TO 9 DOSES  
  
 topiramate 100 mg tablet Commonly known as:  TOPAMAX TAKE 1 TAB BY MOUTH TWO (2) TIMES A DAY. traMADol 50 mg tablet Commonly known as:  ULTRAM  
TAKE 1 TABLET EVERY 8 HOURS AS NEEDED FOR PAIN. MAX DAILY AMOUNT 3 TABS Prescriptions Sent to Pharmacy Refills  
 methylPREDNISolone (MEDROL DOSEPACK) 4 mg tablet 0 Sig: Take as prescribed. Class: Normal  
 Pharmacy: [facility]/pharmacy #[id number] - [place] [address] Ph #: [phone]  
 amoxicillin-clavulanate (AUGMENTIN) 875-125 mg per tablet 0 Sig: Take 1 Tab by mouth two (2) times a day for 10 days. Class: Normal  
 Pharmacy: [facility]/pharmacy #[id number] - [place] [address] Ph #: [phone] Route: Oral  
  
Follow-up Instructions Return if symptoms worsen or fail to improve. Patient Instructions Ear Infection (Otitis Media): Care Instructions Your Care Instructions An ear infection may start with a cold and affect the middle ear (otitis media). It can hurt a lot. Most ear infections clear up on their own in a couple of days. Most often you will not need antibiotics. This is because many ear infections are caused by a virus. Antibiotics don't work against a virus. Regular doses of pain medicines are the best way to reduce your fever and help you feel better. Follow-up care is a key part of your treatment and safety. Be sure to make and go to all appointments, and call your doctor if you are having problems. It's also a good idea to know your test results and keep a list of the medicines you take. How can you care for yourself at home? · Take pain medicines exactly as directed. ¨ If the doctor gave you a prescription medicine for pain, take it as prescribed. ¨ If you are not taking a prescription pain medicine, take an over-the-counter medicine, such as acetaminophen (Tylenol), ibuprofen (Advil, Motrin), or naproxen (Aleve). Read and follow all instructions on the label. ¨ Do not take two or more pain medicines at the same time unless the doctor told you to. Many pain medicines have acetaminophen, which is Tylenol. Too much acetaminophen (Tylenol) can be harmful. · Plan to take a full dose of pain reliever before bedtime. Getting enough sleep will help you get better. · Try a warm, moist washcloth on the ear. It may help relieve pain. · If your doctor prescribed antibiotics, take them as directed. Do not stop taking them just because you feel better. You need to take the full course of antibiotics. When should you call for help? Call your doctor now or seek immediate medical care if: 
· You have new or increasing ear pain. · You have new or increasing pus or blood draining from your ear. · You have a fever with a stiff neck or a severe headache. Watch closely for changes in your health, and be sure to contact your doctor if: 
· You have new or worse symptoms. · You are not getting better after taking an antibiotic for 2 days. Where can you learn more? Go to http://smooth-connie.info/. Enter G748 in the search box to learn more about \"Ear Infection (Otitis Media): Care Instructions. \" Current as of: July 29, 2016 Content Version: 11.2 © 0314-7202 [employer]wise[employer]. Care instructions adapted under license by [employer] (which disclaims liability or warranty for this information). If you have questions about a medical condition or this instruction, always ask your healthcare professional. [place] [address] any warranty or liability for your use of this information. Introducing [facility]! Dulce Maria Ochoa introduces [employer] patient portal. Now you can access parts of your medical record, email your doctor's office, and request medication refills online. 1. In your internet browser, go to [email or web address] 2. Click on the First Time User? Click Here link in the Sign In box. You will see the New Member Sign Up page. 3. Enter your [employer] Access Code exactly as it appears below. You will not need to use this code after youve completed the sign-up process. If you do not sign up before the expiration date, you must request a new code. · [employer] Access Code: [email or web address] Expires: 9/3/2017  1:40 PM 
 
4. Enter the last four digits of your Social Security Number (xxxx) and Date of Birth (mm/dd/yyyy) as indicated and click Submit. You will be taken to the next sign-up page. 5. Create a [employer] ID. This will be your [employer] login ID and cannot be changed, so think of one that is secure and easy to remember. 6. Create a [employer] password. You can change your password at any time. 7. Enter your Password Reset Question and Answer. This can be used at a later time if you forget your password. 8. Enter your e-mail address. You will receive e-mail notification when new information is available in [address] 9. Click Sign Up. You can now view and download portions of your medical record. 10. Click the Download Summary menu link to download a portable copy of your medical information. If you have questions, please visit the Frequently Asked Questions section of the [employer] website. Remember, [employer] is NOT to be used for urgent needs. For medical emergencies, dial 911. Now available from your iPhone and Android! Please provide this summary of care documentation to your next provider. Your primary care clinician is listed as Melissa Patel. If you have any questions after today's visit, please call [phone].

## 2017-06-05 NOTE — PROGRESS NOTES
HISTORY OF PRESENT ILLNESS  Vincent Julian is a 41 y.o. female. HPI  Pt presents with \"ear pain and ringing in her ear\"    Pt states that she has been dealing with ringing in her ears for months now. Over the last week or so, the ringing in her right ear is worse. In addition, she has had some pain and fullness in her right ear. Nasal congestion and sinus pressure. Sometimes, it feels like a vibration in her ears. No fever. Pt has been seen by ENT in relation to the ringing, and they stated that she had some hearing loss, which was making the ringing worse. They wanted her to get hearing aids, but she got busy and never followed up. Review of Systems   Constitutional: Negative for fever. HENT: Positive for congestion, ear pain and tinnitus. Respiratory: Negative for cough. Gastrointestinal: Negative for diarrhea and vomiting. Physical Exam   Constitutional: She is oriented to person, place, and time. She appears well-developed and well-nourished. HENT:   Head: Normocephalic and atraumatic. Right Ear: External ear normal. Tympanic membrane is erythematous and retracted. Decreased hearing is noted. Left Ear: Hearing, tympanic membrane, external ear and ear canal normal.   Nose: Mucosal edema and rhinorrhea present. Mouth/Throat: Posterior oropharyngeal edema and posterior oropharyngeal erythema present. Neck: Normal range of motion. Neck supple. Cardiovascular: Normal rate, regular rhythm and normal heart sounds. Pulmonary/Chest: Effort normal and breath sounds normal.   Lymphadenopathy:     She has no cervical adenopathy. Neurological: She is alert and oriented to person, place, and time. Skin: Skin is warm and dry. Psychiatric: She has a normal mood and affect. Her behavior is normal.       ASSESSMENT and PLAN    ICD-10-CM ICD-9-CM    1.  Acute mucoid otitis media of right ear H65.111 381.02 methylPREDNISolone (MEDROL DOSEPACK) 4 mg tablet      amoxicillin-clavulanate (AUGMENTIN) 875-125 mg per tablet     Informed patient that I have sent medication to the pharmacy, and she should take as prescribed. Educated about taking with food, to decrease the risk of stomach upset. Educated about staying well hydrated, by pushing fluids as much as possible. Educated about following up with ENT, due to ringing in ears and decreased hearing ASAP. Pt informed to return to office with worsening of symptoms, or PRN with any questions or concerns. Pt verbalizes understanding of plan of care and denies further questions or concerns at this time.

## 2017-06-05 NOTE — PROGRESS NOTES
Identified pt with two pt identifiers(name and ). Chief Complaint   Patient presents with    Ear Pain     x2 weeks    Ringing in Ear      Pt states \"I may have a sinus infection too\"    There are no preventive care reminders to display for this patient. Wt Readings from Last 3 Encounters:   17 160 lb (72.6 kg)   17 164 lb (74.4 kg)   17 164 lb 12.8 oz (74.8 kg)     Temp Readings from Last 3 Encounters:   17 98.6 °F (37 °C) (Oral)   17 97.6 °F (36.4 °C)   17 98.5 °F (36.9 °C) (Oral)     BP Readings from Last 3 Encounters:   17 120/72   17 103/66   17 95/66     Pulse Readings from Last 3 Encounters:   17 (!) 110   17 72   17 (!) 105         Learning Assessment:  :     Learning Assessment 2014   PRIMARY LEARNER Patient Patient   HIGHEST LEVEL OF EDUCATION - PRIMARY LEARNER  DID NOT GRADUATE HIGH SCHOOL DID NOT GRADUATE HIGH SCHOOL   BARRIERS PRIMARY LEARNER - NONE   CO-LEARNER CAREGIVER - No   PRIMARY LANGUAGE ENGLISH ENGLISH   LEARNER PREFERENCE PRIMARY DEMONSTRATION DEMONSTRATION   ANSWERED BY patient patient    RELATIONSHIP SELF SELF       Depression Screening:  :     PHQ over the last two weeks 3/6/2017   PHQ Not Done Active Diagnosis of Depression or Bipolar Disorder   Little interest or pleasure in doing things -   Feeling down, depressed or hopeless -   Total Score PHQ 2 -       Fall Risk Assessment:  :     Fall Risk Assessment, last 12 mths 3/6/2017   Able to walk? Yes   Fall in past 12 months? No       Abuse Screening:  :     Abuse Screening Questionnaire 3/6/2017 1/15/2016 2014   Do you ever feel afraid of your partner? N N N   Are you in a relationship with someone who physically or mentally threatens you? N N N   Is it safe for you to go home?  Siddharth Sr       Coordination of Care Questionnaire:  :     1) Have you been to an emergency room, urgent care clinic since your last visit? no   Hospitalized since your last visit? no             2) Have you seen or consulted any other health care providers outside of [address] [place] [address] since your last visit? no  (Include any pap smears or colon screenings in this section.)    3) Do you have an Advance Directive on file? no  Are you interested in receiving information about Advance Directives? no    Patient is accompanied by self I have received verbal consent from Shay Lacy to discuss any/all medical information while they are present in the room. Reviewed record in preparation for visit and have obtained necessary documentation. Medication reconciliation up to date and corrected with patient at this time.

## 2017-06-05 NOTE — PATIENT INSTRUCTIONS
Ear Infection (Otitis Media): Care Instructions  Your Care Instructions    An ear infection may start with a cold and affect the middle ear (otitis media). It can hurt a lot. Most ear infections clear up on their own in a couple of days. Most often you will not need antibiotics. This is because many ear infections are caused by a virus. Antibiotics don't work against a virus. Regular doses of pain medicines are the best way to reduce your fever and help you feel better. Follow-up care is a key part of your treatment and safety. Be sure to make and go to all appointments, and call your doctor if you are having problems. It's also a good idea to know your test results and keep a list of the medicines you take. How can you care for yourself at home? · Take pain medicines exactly as directed. ¨ If the doctor gave you a prescription medicine for pain, take it as prescribed. ¨ If you are not taking a prescription pain medicine, take an over-the-counter medicine, such as acetaminophen (Tylenol), ibuprofen (Advil, Motrin), or naproxen (Aleve). Read and follow all instructions on the label. ¨ Do not take two or more pain medicines at the same time unless the doctor told you to. Many pain medicines have acetaminophen, which is Tylenol. Too much acetaminophen (Tylenol) can be harmful. · Plan to take a full dose of pain reliever before bedtime. Getting enough sleep will help you get better. · Try a warm, moist washcloth on the ear. It may help relieve pain. · If your doctor prescribed antibiotics, take them as directed. Do not stop taking them just because you feel better. You need to take the full course of antibiotics. When should you call for help? Call your doctor now or seek immediate medical care if:  · You have new or increasing ear pain. · You have new or increasing pus or blood draining from your ear. · You have a fever with a stiff neck or a severe headache.   Watch closely for changes in your health, and be sure to contact your doctor if:  · You have new or worse symptoms. · You are not getting better after taking an antibiotic for 2 days. Where can you learn more? Go to http://smooth-connie.info/. Enter E491 in the search box to learn more about \"Ear Infection (Otitis Media): Care Instructions. \"  Current as of: July 29, 2016  Content Version: 11.2  © 6708-0897 [employer]. Care instructions adapted under license by [employer] (which disclaims liability or warranty for this information). If you have questions about a medical condition or this instruction, always ask your healthcare professional. [address] any warranty or liability for your use of this information.

## 2017-06-06 ENCOUNTER — TELEPHONE (OUTPATIENT)
Dept: FAMILY MEDICINE CLINIC | Age: 47
End: 2017-06-06

## 2017-06-06 DIAGNOSIS — R51.9 HEADACHE, UNSPECIFIED HEADACHE TYPE: ICD-10-CM

## 2017-06-06 RX ORDER — BUTALBITAL, ACETAMINOPHEN AND CAFFEINE 50; 325; 40 MG/1; MG/1; MG/1
TABLET ORAL
Qty: 30 TAB | Refills: 0 | Status: SHIPPED | OUTPATIENT
Start: 2017-06-06 | End: 2017-07-05 | Stop reason: SDUPTHER

## 2017-06-06 NOTE — TELEPHONE ENCOUNTER
Pt is in office with daughter, and requesting order for Fioricet at this time. She has had a headache off and on for a couple of days, and it does not feel like her usual migraine. Will give rx at this time, and patient should return to office with continued headaches.

## 2017-07-05 ENCOUNTER — OFFICE VISIT (OUTPATIENT)
Dept: FAMILY MEDICINE CLINIC | Age: 47
End: 2017-07-05

## 2017-07-05 VITALS
RESPIRATION RATE: 16 BRPM | WEIGHT: 160 LBS | TEMPERATURE: 98.9 F | HEIGHT: 63 IN | DIASTOLIC BLOOD PRESSURE: 72 MMHG | HEART RATE: 115 BPM | BODY MASS INDEX: 28.35 KG/M2 | SYSTOLIC BLOOD PRESSURE: 120 MMHG | OXYGEN SATURATION: 100 %

## 2017-07-05 DIAGNOSIS — R51.9 HEADACHE, UNSPECIFIED HEADACHE TYPE: ICD-10-CM

## 2017-07-05 DIAGNOSIS — J02.9 SORE THROAT: Primary | ICD-10-CM

## 2017-07-05 DIAGNOSIS — H92.01 RIGHT EAR PAIN: ICD-10-CM

## 2017-07-05 LAB
S PYO AG THROAT QL: NEGATIVE
VALID INTERNAL CONTROL?: YES

## 2017-07-05 RX ORDER — CEPHALEXIN 500 MG/1
500 CAPSULE ORAL 2 TIMES DAILY
Qty: 20 CAP | Refills: 0 | Status: SHIPPED | OUTPATIENT
Start: 2017-07-05 | End: 2017-07-15

## 2017-07-05 RX ORDER — NEOMYCIN SULFATE, POLYMYXIN B SULFATE AND HYDROCORTISONE 10; 3.5; 1 MG/ML; MG/ML; [USP'U]/ML
3 SUSPENSION/ DROPS AURICULAR (OTIC) 4 TIMES DAILY
Qty: 10 ML | Refills: 0 | Status: SHIPPED | OUTPATIENT
Start: 2017-07-05 | End: 2017-07-12

## 2017-07-05 RX ORDER — BUTALBITAL, ACETAMINOPHEN AND CAFFEINE 50; 325; 40 MG/1; MG/1; MG/1
TABLET ORAL
Qty: 30 TAB | Refills: 0 | Status: SHIPPED | OUTPATIENT
Start: 2017-07-05 | End: 2017-10-09 | Stop reason: SDUPTHER

## 2017-07-05 NOTE — MR AVS SNAPSHOT
Visit Information Date & Time Provider Department Dept. Phone Encounter #  
 7/5/2017  2:30 PM Sandee Scherer  [address] [phone] [id number] Follow-up Instructions Return if symptoms worsen or fail to improve. Upcoming Health Maintenance Date Due INFLUENZA AGE 9 TO ADULT 8/1/2017 PAP AKA CERVICAL CYTOLOGY 8/31/2018 DTaP/Tdap/Td series (2 - Td) 8/31/2025 Allergies as of 7/5/2017  Review Complete On: 7/5/2017 By: Sandee Scherer NP Severity Noted Reaction Type Reactions Erythromycin  01/21/2015    Nausea and Vomiting Vancomycin  12/02/2013    Swelling Current Immunizations  Reviewed on 8/31/2015 Name Date Influenza Vaccine (Quad) PF 11/2/2016 Tdap 8/31/2015 12:12 PM  
  
 Not reviewed this visit You Were Diagnosed With   
  
 Codes Comments Sore throat    -  Primary ICD-10-CM: J02.9 ICD-9-CM: 214 Right ear pain     ICD-10-CM: H92.01 
ICD-9-CM: 388.70 Headache, unspecified headache type     ICD-10-CM: R51 ICD-9-CM: 552. 0 Vitals BP Pulse Temp Resp Height(growth percentile) Weight(growth percentile) 120/72 (!) 115 98.9 °F (37.2 °C) (Oral) 16 5' 3\" (1.6 m) 160 lb (72.6 kg) LMP SpO2 BMI OB Status Smoking Status 01/09/2010 100% 28.34 kg/m2 Hysterectomy Never Smoker BMI and BSA Data Body Mass Index Body Surface Area  
 28.34 kg/m 2 1.8 m 2 Preferred Pharmacy Pharmacy Name Phone [facility]/PHARMACY #[id number] - [facility] [place] - [address] [phone] Your Updated Medication List  
  
   
This list is accurate as of: 7/5/17  2:37 PM.  Always use your most recent med list.  
  
  
  
  
 buPROPion  mg XL tablet Commonly known as:  WELLBUTRIN XL  
TAKE 1 TAB BY MOUTH EVERY MORNING.  
  
 butalbital-acetaminophen-caffeine -40 mg per tablet Commonly known as:  FIORICET, ESGIC  
TAKE 1 TABLET BY MOUTH EVERY 6 HOURS AS NEEDED FOR HEADACHE.  MAX DAILY AMOUNT 4 TABS  
  
 cephALEXin 500 mg capsule Commonly known as:  Adam Tuttle Take 1 Cap by mouth two (2) times a day for 10 days. cetirizine 10 mg tablet Commonly known as:  ZyrTEC Take 1 Tab by mouth daily. citalopram 20 mg tablet Commonly known as:  CELEXA  
TAKE 1 TAB BY MOUTH DAILY. cyclobenzaprine 10 mg tablet Commonly known as:  FLEXERIL  
TAKE 1 TABLET BY MOUTH EACH NIGHT AT BEDTIME  
  
 fluticasone 50 mcg/actuation nasal spray Commonly known as:  FLONASE ALLERGY RELIEF  
2 sprays per nostril once a day  
  
 gabapentin 800 mg tablet Commonly known as:  NEURONTIN  
TAKE 1 TABLET BY MOUTH THREE TIMES DAILY  
  
 methylPREDNISolone 4 mg tablet Commonly known as:  Greenwood Pacer Take as prescribed. neomycin-polymyxin-hydrocortisone (buffered) 3.5-10,000-1 mg/mL-unit/mL-% otic suspension Commonly known as:  Birtha Charismafeld Administer 3 Drops in right ear four (4) times daily for 7 days. nortriptyline 25 mg capsule Commonly known as:  PAMELOR  
TAKE 1-2 CAPSULES BY MOUTH AT BEDTIME  
  
 SUMAtriptan 100 mg tablet Commonly known as:  IMITREX  
TAKE 1 TABLET BY MOUTH ONCE AS NEEDED FOR MIGRAINE FOR UP TO 9 DOSES  
  
 topiramate 100 mg tablet Commonly known as:  TOPAMAX TAKE 1 TAB BY MOUTH TWO (2) TIMES A DAY. traMADol 50 mg tablet Commonly known as:  ULTRAM  
TAKE 1 TABLET EVERY 8 HOURS AS NEEDED FOR PAIN. MAX DAILY AMOUNT 3 TABS Prescriptions Printed Refills  
 butalbital-acetaminophen-caffeine (FIORICET, ESGIC) -40 mg per tablet 0 Sig: TAKE 1 TABLET BY MOUTH EVERY 6 HOURS AS NEEDED FOR HEADACHE. MAX DAILY AMOUNT 4 TABS Class: Print Prescriptions Sent to Pharmacy Refills  
 cephALEXin (KEFLEX) 500 mg capsule 0 Sig: Take 1 Cap by mouth two (2) times a day for 10 days. Class: Normal  
 Pharmacy: [facility]/pharmacy #[id number] - [place] [address] Ph #: [phone]  Route: Oral  
 neomycin-polymyxin-hydrocortisone, buffered, (PEDIOTIC) 3.5-10,000-1 mg/mL-unit/mL-% otic suspension 0 Sig: Administer 3 Drops in right ear four (4) times daily for 7 days. Class: Normal  
 Pharmacy: [facility]/pharmacy #[id number] - [place] - [address] Ph #: [phone] Route: Right Ear We Performed the Following AMB POC RAPID STREP A [22023 CPT(R)] Follow-up Instructions Return if symptoms worsen or fail to improve. Patient Instructions Headache: Care Instructions Your Care Instructions Headaches have many possible causes. Most headaches aren't a sign of a more serious problem, and they will get better on their own. Home treatment may help you feel better faster. The doctor has checked you carefully, but problems can develop later. If you notice any problems or new symptoms, get medical treatment right away. Follow-up care is a key part of your treatment and safety. Be sure to make and go to all appointments, and call your doctor if you are having problems. It's also a good idea to know your test results and keep a list of the medicines you take. How can you care for yourself at home? · Do not drive if you have taken a prescription pain medicine. · Rest in a quiet, dark room until your headache is gone. Close your eyes and try to relax or go to sleep. Don't watch TV or read. · Put a cold, moist cloth or cold pack on the painful area for 10 to 20 minutes at a time. Put a thin cloth between the cold pack and your skin. · Use a warm, moist towel or a heating pad set on low to relax tight shoulder and neck muscles. · Have someone gently massage your neck and shoulders. · Take pain medicines exactly as directed. ¨ If the doctor gave you a prescription medicine for pain, take it as prescribed. ¨ If you are not taking a prescription pain medicine, ask your doctor if you can take an over-the-counter medicine. · Be careful not to take pain medicine more often than the instructions allow, because you may get worse or more frequent headaches when the medicine wears off. · Do not ignore new symptoms that occur with a headache, such as a fever, weakness or numbness, vision changes, or confusion. These may be signs of a more serious problem. To prevent headaches · Keep a headache diary so you can figure out what triggers your headaches. Avoiding triggers may help you prevent headaches. Record when each headache began, how long it lasted, and what the pain was like (throbbing, aching, stabbing, or dull). Write down any other symptoms you had with the headache, such as nausea, flashing lights or dark spots, or sensitivity to bright light or loud noise. Note if the headache occurred near your period. List anything that might have triggered the headache, such as certain foods (chocolate, cheese, wine) or odors, smoke, bright light, stress, or lack of sleep. · Find healthy ways to deal with stress. Headaches are most common during or right after stressful times. Take time to relax before and after you do something that has caused a headache in the past. 
· Try to keep your muscles relaxed by keeping good posture. Check your jaw, face, neck, and shoulder muscles for tension, and try relaxing them. When sitting at a desk, change positions often, and stretch for 30 seconds each hour. · Get plenty of sleep and exercise. · Eat regularly and well. Long periods without food can trigger a headache. · Treat yourself to a massage. Some people find that regular massages are very helpful in relieving tension. · Limit caffeine by not drinking too much coffee, tea, or soda. But don't quit caffeine suddenly, because that can also give you headaches. · Reduce eyestrain from computers by blinking frequently and looking away from the computer screen every so often.  Make sure you have proper eyewear and that your monitor is set up properly, about an arm's length away. · Seek help if you have depression or anxiety. Your headaches may be linked to these conditions. Treatment can both prevent headaches and help with symptoms of anxiety or depression. When should you call for help? Call 911 anytime you think you may need emergency care. For example, call if: 
· You have signs of a stroke. These may include: 
¨ Sudden numbness, paralysis, or weakness in your face, arm, or leg, especially on only one side of your body. ¨ Sudden vision changes. ¨ Sudden trouble speaking. ¨ Sudden confusion or trouble understanding simple statements. ¨ Sudden problems with walking or balance. ¨ A sudden, severe headache that is different from past headaches. Call your doctor now or seek immediate medical care if: 
· You have a new or worse headache. · Your headache gets much worse. Where can you learn more? Go to http://smotoh-connie.info/. Enter M271 in the search box to learn more about \"Headache: Care Instructions. \" Current as of: October 14, 2016 Content Version: 11.3 © 9555-4288 [employer]. Care instructions adapted under license by [employer] (which disclaims liability or warranty for this information). If you have questions about a medical condition or this instruction, always ask your healthcare professional. [address] any warranty or liability for your use of this information. Introducing [facility]! [facility] introduces LendMeYourLiteracy patient portal. Now you can access parts of your medical record, email your doctor's office, and request medication refills online. 1. In your internet browser, go to [email or web address] 2. Click on the First Time User? Click Here link in the Sign In box. You will see the New Member Sign Up page. 3. Enter your LendMeYourLiteracy Access Code exactly as it appears below.  You will not need to use this code after youve completed the sign-up process. If you do not sign up before the expiration date, you must request a new code. · ShopIt Access Code: [email or web address] Expires: 9/3/2017  1:40 PM 
 
4. Enter the last four digits of your Social Security Number (xxxx) and Date of Birth (mm/dd/yyyy) as indicated and click Submit. You will be taken to the next sign-up page. 5. Create a ShopIt ID. This will be your ShopIt login ID and cannot be changed, so think of one that is secure and easy to remember. 6. Create a ShopIt password. You can change your password at any time. 7. Enter your Password Reset Question and Answer. This can be used at a later time if you forget your password. 8. Enter your e-mail address. You will receive e-mail notification when new information is available in [address] 9. Click Sign Up. You can now view and download portions of your medical record. 10. Click the Download Summary menu link to download a portable copy of your medical information. If you have questions, please visit the Frequently Asked Questions section of the ShopIt website. Remember, ShopIt is NOT to be used for urgent needs. For medical emergencies, dial 911. Now available from your iPhone and Android! Please provide this summary of care documentation to your next provider. Your primary care clinician is listed as Asya Lim. If you have any questions after today's visit, please call [phone].

## 2017-07-05 NOTE — PROGRESS NOTES
Identified pt with two pt identifiers(name and ). Chief Complaint   Patient presents with    Cold Symptoms     x2 days    Ear Pain    Sore Throat        There are no preventive care reminders to display for this patient. Wt Readings from Last 3 Encounters:   17 160 lb (72.6 kg)   17 160 lb (72.6 kg)   17 164 lb (74.4 kg)     Temp Readings from Last 3 Encounters:   17 98.9 °F (37.2 °C) (Oral)   17 98.6 °F (37 °C) (Oral)   17 97.6 °F (36.4 °C)     BP Readings from Last 3 Encounters:   17 120/72   17 120/72   17 103/66     Pulse Readings from Last 3 Encounters:   17 (!) 115   17 (!) 110   17 72         Learning Assessment:  :     Learning Assessment 2014   PRIMARY LEARNER Patient Patient   HIGHEST LEVEL OF EDUCATION - PRIMARY LEARNER  DID NOT GRADUATE HIGH SCHOOL DID NOT GRADUATE HIGH SCHOOL   BARRIERS PRIMARY LEARNER - NONE   CO-LEARNER CAREGIVER - No   PRIMARY LANGUAGE ENGLISH ENGLISH   LEARNER PREFERENCE PRIMARY DEMONSTRATION DEMONSTRATION   ANSWERED BY patient patient    RELATIONSHIP SELF SELF       Depression Screening:  :     PHQ over the last two weeks 3/6/2017   PHQ Not Done Active Diagnosis of Depression or Bipolar Disorder   Little interest or pleasure in doing things -   Feeling down, depressed or hopeless -   Total Score PHQ 2 -       Fall Risk Assessment:  :     Fall Risk Assessment, last 12 mths 3/6/2017   Able to walk? Yes   Fall in past 12 months? No       Abuse Screening:  :     Abuse Screening Questionnaire 3/6/2017 1/15/2016 2014   Do you ever feel afraid of your partner? N N N   Are you in a relationship with someone who physically or mentally threatens you? N N N   Is it safe for you to go home?  Y Y Y       Coordination of Care Questionnaire:  :     1) Have you been to an emergency room, urgent care clinic since your last visit? no   Hospitalized since your last visit? no             2) Have you seen or consulted any other health care providers outside of [address] [place] [address] since your last visit? no  (Include any pap smears or colon screenings in this section.)    3) Do you have an Advance Directive on file? no  Are you interested in receiving information about Advance Directives? no    Patient is accompanied by self I have received verbal consent from Savanna Robert to discuss any/all medical information while they are present in the room. Reviewed record in preparation for visit and have obtained necessary documentation. Medication reconciliation up to date and corrected with patient at this time.

## 2017-07-05 NOTE — PROGRESS NOTES
HISTORY OF PRESENT ILLNESS  Glenn Guevara is a 41 y.o. female. HPI  Pt presents with \"cold symptoms, ear pain, sore throat\"    Pt states that for about a week or so, she has felt some pressure in her right ear. Last night, she noted an intense headache and sore throat. Over the day, her headache and sore throat has continued to worsen. Nasal congestion  Sinus pressure  Fever: none    Pt is leaving for th beach in 2 days, and is worried about being sick on vacation. Review of Systems   Constitutional: Negative for fever. HENT: Positive for congestion, ear pain and sore throat. Respiratory: Negative for cough. Gastrointestinal: Negative for diarrhea and vomiting. Neurological: Positive for headaches. Physical Exam   Constitutional: She is oriented to person, place, and time. She appears well-developed and well-nourished. HENT:   Head: Normocephalic and atraumatic. Right Ear: Hearing and external ear normal. A middle ear effusion is present. Left Ear: Hearing, tympanic membrane, external ear and ear canal normal.   Nose: Mucosal edema present. Mouth/Throat: Oropharynx is clear and moist.   Neck: Normal range of motion. Neck supple. Cardiovascular: Normal rate, regular rhythm and normal heart sounds. Pulmonary/Chest: Effort normal and breath sounds normal.   Lymphadenopathy:     She has no cervical adenopathy. Neurological: She is alert and oriented to person, place, and time. Skin: Skin is warm and dry. Psychiatric: She has a normal mood and affect. Her behavior is normal.       ASSESSMENT and PLAN    ICD-10-CM ICD-9-CM    1. Sore throat J02.9 462 AMB POC RAPID STREP A      cephALEXin (KEFLEX) 500 mg capsule   2. Right ear pain H92.01 388.70 neomycin-polymyxin-hydrocortisone, buffered, (PEDIOTIC) 3.5-10,000-1 mg/mL-unit/mL-% otic suspension   3.  Headache, unspecified headache type R51 784.0 butalbital-acetaminophen-caffeine (FIORICET, ESGIC) -40 mg per tablet     Educated about taking medications as prescribed. Educated about staying well hydrated, and taking Tylenol or Motrin as needed. Pt informed to return to office with worsening of symptoms, or PRN with any questions or concerns. Pt verbalizes understanding of plan of care and denies further questions or concerns at this time.

## 2017-07-05 NOTE — PATIENT INSTRUCTIONS

## 2017-07-14 ENCOUNTER — TELEPHONE (OUTPATIENT)
Dept: FAMILY MEDICINE CLINIC | Age: 47
End: 2017-07-14

## 2017-07-14 DIAGNOSIS — J40 BRONCHITIS: Primary | ICD-10-CM

## 2017-07-14 RX ORDER — CEFDINIR 300 MG/1
300 CAPSULE ORAL 2 TIMES DAILY
Qty: 20 CAP | Refills: 0 | Status: SHIPPED | OUTPATIENT
Start: 2017-07-14 | End: 2017-07-24

## 2017-07-14 RX ORDER — CODEINE PHOSPHATE AND GUAIFENESIN 10; 100 MG/5ML; MG/5ML
5 SOLUTION ORAL
Qty: 118 ML | Refills: 0 | Status: SHIPPED | OUTPATIENT
Start: 2017-07-14 | End: 2018-02-12 | Stop reason: ALTCHOICE

## 2017-07-14 NOTE — TELEPHONE ENCOUNTER
Returned patients call. She is still having a productive cough, and is not able to sleep due to cough. She is on last day of Keflex. Informed patient that as she is unable to be seen at this time, we will send in new prescription antibiotic, and will print cough medicine. Educated about risk of taking the cough medication with the muscle relaxer, as this will make her fatigued. Educated about not driving while taking the medication. Should symptoms persist after this, she will return to office for appointment.

## 2017-07-14 NOTE — TELEPHONE ENCOUNTER
Forwarded from the call center;    Pt is requesting for you to call her. She stated she is still coughing,ear pain and has a sore throat. Pts number is [phone].

## 2017-09-24 RX ORDER — NORTRIPTYLINE HYDROCHLORIDE 25 MG/1
CAPSULE ORAL
Qty: 60 CAP | Refills: 5 | Status: SHIPPED | OUTPATIENT
Start: 2017-09-24 | End: 2018-03-30 | Stop reason: SDUPTHER

## 2017-10-03 ENCOUNTER — OFFICE VISIT (OUTPATIENT)
Dept: FAMILY MEDICINE CLINIC | Age: 47
End: 2017-10-03

## 2017-10-03 VITALS
BODY MASS INDEX: 27.32 KG/M2 | OXYGEN SATURATION: 98 % | DIASTOLIC BLOOD PRESSURE: 67 MMHG | TEMPERATURE: 98.1 F | HEIGHT: 63 IN | HEART RATE: 92 BPM | WEIGHT: 154.2 LBS | SYSTOLIC BLOOD PRESSURE: 92 MMHG | RESPIRATION RATE: 16 BRPM

## 2017-10-03 DIAGNOSIS — M25.562 ACUTE PAIN OF LEFT KNEE: Primary | ICD-10-CM

## 2017-10-03 DIAGNOSIS — Z23 ENCOUNTER FOR IMMUNIZATION: ICD-10-CM

## 2017-10-03 RX ORDER — AZITHROMYCIN 250 MG/1
TABLET, FILM COATED ORAL
Refills: 0 | COMMUNITY
Start: 2017-08-14 | End: 2017-10-03 | Stop reason: ALTCHOICE

## 2017-10-03 RX ORDER — METHYLPREDNISOLONE 4 MG/1
TABLET ORAL
Qty: 1 DOSE PACK | Refills: 0 | Status: SHIPPED | OUTPATIENT
Start: 2017-10-03 | End: 2017-11-20 | Stop reason: ALTCHOICE

## 2017-10-03 RX ORDER — HYDROCODONE BITARTRATE AND ACETAMINOPHEN 5; 325 MG/1; MG/1
TABLET ORAL
Refills: 0 | COMMUNITY
Start: 2017-08-16 | End: 2017-11-20

## 2017-10-03 RX ORDER — DICLOFENAC SODIUM 75 MG/1
75 TABLET, DELAYED RELEASE ORAL 2 TIMES DAILY
Qty: 60 TAB | Refills: 0 | Status: SHIPPED | OUTPATIENT
Start: 2017-10-03 | End: 2017-11-20

## 2017-10-03 NOTE — PROGRESS NOTES
Chief Complaint   Patient presents with    Knee Pain     left knee pain for 3 days. getting worse and hard to bend and walk. no know injury     \"REVIEWED RECORD IN PREPARATION FOR VISIT AND HAVE OBTAINED THE NECESSARY DOCUMENTATION\"  1. Have you been to the ER, urgent care clinic since your last visit? Hospitalized since your last visit? No    2. Have you seen or consulted any other health care providers outside of the [address] [place] [address] since your last visit? Include any pap smears or colon screening. No  Patient does not have advanced directives.

## 2017-10-03 NOTE — MR AVS SNAPSHOT
Visit Information Date & Time Provider Department Dept. Phone Encounter #  
 10/3/2017 11:30 AM Patrick Segura Jules [phone] [id number] Follow-up Instructions Return if symptoms worsen or fail to improve. Your Appointments 10/3/2017 11:30 AM  
ACUTE CARE with Rex Kelly  [facility] Appt Note: left knee pain [address] [facility] [address]  
  
   
 [address] Upcoming Health Maintenance Date Due  
 PAP AKA CERVICAL CYTOLOGY 8/31/2018 DTaP/Tdap/Td series (2 - Td) 8/31/2025 Allergies as of 10/3/2017  Review Complete On: 10/3/2017 By: Rex Kelly NP Severity Noted Reaction Type Reactions Erythromycin  01/21/2015    Nausea and Vomiting Vancomycin  12/02/2013    Swelling Current Immunizations  Reviewed on 8/31/2015 Name Date Influenza Vaccine (Quad) PF  Incomplete, 11/2/2016 Tdap 8/31/2015 12:12 PM  
  
 Not reviewed this visit You Were Diagnosed With   
  
 Codes Comments Acute pain of left knee    -  Primary ICD-10-CM: D03.749 ICD-9-CM: 719.46 Encounter for immunization     ICD-10-CM: H40 ICD-9-CM: V03.89 Vitals BP Pulse Temp Resp Height(growth percentile) Weight(growth percentile) 92/67 92 98.1 °F (36.7 °C) (Oral) 16 5' 3\" (1.6 m) 154 lb 3.2 oz (69.9 kg) LMP SpO2 BMI OB Status Smoking Status 01/09/2010 98% 27.32 kg/m2 Hysterectomy Never Smoker BMI and BSA Data Body Mass Index Body Surface Area  
 27.32 kg/m 2 1.76 m 2 Preferred Pharmacy Pharmacy Name Phone [facility]/PHARMACY #[id number] - [place] - [address] [phone] Your Updated Medication List  
  
   
This list is accurate as of: 10/3/17 11:24 AM.  Always use your most recent med list.  
  
  
  
  
 buPROPion  mg XL tablet Commonly known as:  WELLBUTRIN XL  
TAKE 1 TAB BY MOUTH EVERY MORNING.  
  
 butalbital-acetaminophen-caffeine -40 mg per tablet Commonly known as:  FIORICET, ESGIC  
TAKE 1 TABLET BY MOUTH EVERY 6 HOURS AS NEEDED FOR HEADACHE. MAX DAILY AMOUNT 4 TABS  
  
 cetirizine 10 mg tablet Commonly known as:  ZyrTEC Take 1 Tab by mouth daily. citalopram 20 mg tablet Commonly known as:  CELEXA  
TAKE 1 TAB BY MOUTH DAILY. cyclobenzaprine 10 mg tablet Commonly known as:  FLEXERIL  
TAKE 1 TABLET BY MOUTH EACH NIGHT AT BEDTIME  
  
 diclofenac EC 75 mg EC tablet Commonly known as:  VOLTAREN Take 1 Tab by mouth two (2) times a day. fluticasone 50 mcg/actuation nasal spray Commonly known as:  FLONASE ALLERGY RELIEF  
2 sprays per nostril once a day  
  
 gabapentin 800 mg tablet Commonly known as:  NEURONTIN  
TAKE 1 TABLET BY MOUTH THREE TIMES DAILY  
  
 guaiFENesin-codeine 100-10 mg/5 mL solution Commonly known as:  ROBITUSSIN AC Take 5 mL by mouth nightly as needed for Cough. Max Daily Amount: 5 mL. HYDROcodone-acetaminophen 5-325 mg per tablet Commonly known as:  NORCO  
TAKE 1 TABLET BY MOUTH EVERY 4 TO 6 HOURS AS NEEDED FOR PAIN  
  
 methylPREDNISolone 4 mg tablet Commonly known as:  Faina Gentle Take as prescribed  
  
 nortriptyline 25 mg capsule Commonly known as:  PAMELOR  
TAKE 1-2 CAPSULES BY MOUTH AT BEDTIME  
  
 SUMAtriptan 100 mg tablet Commonly known as:  IMITREX  
TAKE 1 TABLET BY MOUTH ONCE AS NEEDED FOR MIGRAINE FOR UP TO 9 DOSES  
  
 topiramate 100 mg tablet Commonly known as:  TOPAMAX TAKE 1 TAB BY MOUTH TWO (2) TIMES A DAY. traMADol 50 mg tablet Commonly known as:  ULTRAM  
TAKE 1 TABLET EVERY 8 HOURS AS NEEDED FOR PAIN. MAX DAILY AMOUNT 3 TABS Prescriptions Sent to Pharmacy Refills  
 methylPREDNISolone (MEDROL DOSEPACK) 4 mg tablet 0 Sig: Take as prescribed  Class: Normal  
 Pharmacy: [facility]/pharmacy [address], [place] - [address] Ph #: [phone]  
 diclofenac EC (VOLTAREN) 75 mg EC tablet 0 Sig: Take 1 Tab by mouth two (2) times a day. Class: Normal  
 Pharmacy: [facility]/pharmacy #[id number] - [place] - [address] Ph #: [phone] Route: Oral  
  
We Performed the Following INFLUENZA VIRUS VAC QUAD,SPLIT,PRESV FREE SYRINGE IM [id number] CPT(R)] Follow-up Instructions Return if symptoms worsen or fail to improve. To-Do List   
 10/04/2017 Imaging:  XR KNEE LT MAX 2 VWS Patient Instructions Vaccine Information Statement Influenza (Flu) Vaccine (Inactivated or Recombinant): What you need to know Many Vaccine Information Statements are available in Indonesian and other languages. See www.immunize.org/vis Hojas de Información Sobre Vacunas están disponibles en Español y en muchos otros idiomas. Visite www.immunize.org/vis 1. Why get vaccinated? Influenza (flu) is a contagious disease that spreads around the [facility] every year, usually between October and May. Flu is caused by influenza viruses, and is spread mainly by coughing, sneezing, and close contact. Anyone can get flu. Flu strikes suddenly and can last several days. Symptoms vary by age, but can include: 
 fever/chills  sore throat  muscle aches  fatigue  cough  headache  runny or stuffy nose Flu can also lead to pneumonia and blood infections, and cause diarrhea and seizures in children. If you have a medical condition, such as heart or lung disease, flu can make it worse. Flu is more dangerous for some people. Infants and young children, people 72years of age and older, pregnant women, and people with certain health conditions or a weakened immune system are at greatest risk. Each year thousands of people in the [facility] die from flu, and many more are hospitalized.   
 
Flu vaccine can: 
 keep you from getting flu, 
  make flu less severe if you do get it, and 
 keep you from spreading flu to your family and other people. 2. Inactivated and recombinant flu vaccines A dose of flu vaccine is recommended every flu season. Children 6 months through 6years of age may need two doses during the same flu season. Everyone else needs only one dose each flu season. Some inactivated flu vaccines contain a very small amount of a mercury-based preservative called thimerosal. Studies have not shown thimerosal in vaccines to be harmful, but flu vaccines that do not contain thimerosal are available. There is no live flu virus in flu shots. They cannot cause the flu. There are many flu viruses, and they are always changing. Each year a new flu vaccine is made to protect against three or four viruses that are likely to cause disease in the upcoming flu season. But even when the vaccine doesnt exactly match these viruses, it may still provide some protection Flu vaccine cannot prevent: 
 flu that is caused by a virus not covered by the vaccine, or 
 illnesses that look like flu but are not. It takes about 2 weeks for protection to develop after vaccination, and protection lasts through the flu season. 3. Some people should not get this vaccine Tell the person who is giving you the vaccine:  If you have any severe, life-threatening allergies. If you ever had a life-threatening allergic reaction after a dose of flu vaccine, or have a severe allergy to any part of this vaccine, you may be advised not to get vaccinated. Most, but not all, types of flu vaccine contain a small amount of egg protein.  If you ever had Guillain-Barré Syndrome (also called GBS). Some people with a history of GBS should not get this vaccine. This should be discussed with your doctor.  If you are not feeling well.    
It is usually okay to get flu vaccine when you have a mild illness, but you might be asked to come back when you feel better. 4. Risks of a vaccine reaction With any medicine, including vaccines, there is a chance of reactions. These are usually mild and go away on their own, but serious reactions are also possible. Most people who get a flu shot do not have any problems with it. Minor problems following a flu shot include:  
 soreness, redness, or swelling where the shot was given  hoarseness  sore, red or itchy eyes  cough  fever  aches  headache  itching  fatigue If these problems occur, they usually begin soon after the shot and last 1 or 2 days. More serious problems following a flu shot can include the following:  There may be a small increased risk of Guillain-Barré Syndrome (GBS) after inactivated flu vaccine. This risk has been estimated at 1 or 2 additional cases per million people vaccinated. This is much lower than the risk of severe complications from flu, which can be prevented by flu vaccine.  Young children who get the flu shot along with pneumococcal vaccine (PCV13) and/or DTaP vaccine at the same time might be slightly more likely to have a seizure caused by fever. Ask your doctor for more information. Tell your doctor if a child who is getting flu vaccine has ever had a seizure. Problems that could happen after any injected vaccine:  People sometimes faint after a medical procedure, including vaccination. Sitting or lying down for about 15 minutes can help prevent fainting, and injuries caused by a fall. Tell your doctor if you feel dizzy, or have vision changes or ringing in the ears.  Some people get severe pain in the shoulder and have difficulty moving the arm where a shot was given. This happens very rarely.  Any medication can cause a severe allergic reaction.  Such reactions from a vaccine are very rare, estimated at about 1 in a million doses, and would happen within a few minutes to a few hours after the vaccination. As with any medicine, there is a very remote chance of a vaccine causing a serious injury or death. The safety of vaccines is always being monitored. For more information, visit: www.cdc.gov/vaccinesafety/ 
 
5. What if there is a serious reaction? What should I look for?  Look for anything that concerns you, such as signs of a severe allergic reaction, very high fever, or unusual behavior. Signs of a severe allergic reaction can include hives, swelling of the face and throat, difficulty breathing, a fast heartbeat, dizziness, and weakness  usually within a few minutes to a few hours after the vaccination. What should I do?  If you think it is a severe allergic reaction or other emergency that cant wait, call 9-1-1 and get the person to the nearest hospital. Otherwise, call your doctor.  Reactions should be reported to the Vaccine Adverse Event Reporting System (VAERS). Your doctor should file this report, or you can do it yourself through  the VAERS web site at www.vaers. [facility].gov, or by calling 6-152.223.8812. VAERS does not give medical advice. 6. The National Vaccine Injury Compensation Program 
 
The [facility] Vaccine Injury Compensation Program (VICP) is a federal program that was created to compensate people who may have been injured by certain vaccines. Persons who believe they may have been injured by a vaccine can learn about the program and about filing a claim by calling 9-444.375.9852 or visiting the [employer] website at www.[facility].gov/vaccinecompensation. There is a time limit to file a claim for compensation. 7. How can I learn more?  Ask your healthcare provider. He or she can give you the vaccine package insert or suggest other sources of information.  Call your local or state health department.  
 Contact the Centers for Disease Control and Prevention (CDC): 
 - Call 8-615.508.8040 (7-962-PCS-INFO) or 
- Visit CDCs website at www.cdc.gov/flu Vaccine Information Statement Inactivated Influenza Vaccine 8/7/2015 
42 TRISHA Suh [id number] [facility] and [employer] Centers for Disease Control and Prevention Office Use Only Introducing [facility]! [employer] introduces My [employer] Zone patient portal. Now you can access parts of your medical record, email your doctor's office, and request medication refills online. 1. In your internet browser, go to [email or web address] 2. Click on the First Time User? Click Here link in the Sign In box. You will see the New Member Sign Up page. 3. Enter your My Team Zone Access Code exactly as it appears below. You will not need to use this code after youve completed the sign-up process. If you do not sign up before the expiration date, you must request a new code. · My Team Zone Access Code: [email or web address] Expires: 1/1/2018 11:24 AM 
 
4. Enter the last four digits of your Social Security Number (xxxx) and Date of Birth (mm/dd/yyyy) as indicated and click Submit. You will be taken to the next sign-up page. 5. Create a My Team Zone ID. This will be your My Team Zone login ID and cannot be changed, so think of one that is secure and easy to remember. 6. Create a My Team Zone password. You can change your password at any time. 7. Enter your Password Reset Question and Answer. This can be used at a later time if you forget your password. 8. Enter your e-mail address. You will receive e-mail notification when new information is available in [address] 9. Click Sign Up. You can now view and download portions of your medical record. 10. Click the Download Summary menu link to download a portable copy of your medical information. If you have questions, please visit the Frequently Asked Questions section of the My Team Zone website.  Remember, My Team Zone is NOT to be used for urgent needs. For medical emergencies, dial 911. Now available from your iPhone and Android! Please provide this summary of care documentation to your next provider. Your primary care clinician is listed as Jyothi Cheung. If you have any questions after today's visit, please call [phone].

## 2017-10-03 NOTE — PATIENT INSTRUCTIONS
Vaccine Information Statement    Influenza (Flu) Vaccine (Inactivated or Recombinant): What you need to know    Many Vaccine Information Statements are available in Japanese and other languages. See www.immunize.org/vis  Hojas de Información Sobre Vacunas están disponibles en Español y en muchos otros idiomas. Visite www.immunize.org/vis    1. Why get vaccinated? Influenza (flu) is a contagious disease that spreads around the [place] every year, usually between October and May. Flu is caused by influenza viruses, and is spread mainly by coughing, sneezing, and close contact. Anyone can get flu. Flu strikes suddenly and can last several days. Symptoms vary by age, but can include:   fever/chills   sore throat   muscle aches   fatigue   cough   headache    runny or stuffy nose    Flu can also lead to pneumonia and blood infections, and cause diarrhea and seizures in children. If you have a medical condition, such as heart or lung disease, flu can make it worse. Flu is more dangerous for some people. Infants and young children, people 72years of age and older, pregnant women, and people with certain health conditions or a weakened immune system are at greatest risk. Each year thousands of people in the [facility] die from flu, and many more are hospitalized. Flu vaccine can:   keep you from getting flu,   make flu less severe if you do get it, and   keep you from spreading flu to your family and other people. 2. Inactivated and recombinant flu vaccines    A dose of flu vaccine is recommended every flu season. Children 6 months through 6years of age may need two doses during the same flu season. Everyone else needs only one dose each flu season.        Some inactivated flu vaccines contain a very small amount of a mercury-based preservative called thimerosal. Studies have not shown thimerosal in vaccines to be harmful, but flu vaccines that do not contain thimerosal are available. There is no live flu virus in flu shots. They cannot cause the flu. There are many flu viruses, and they are always changing. Each year a new flu vaccine is made to protect against three or four viruses that are likely to cause disease in the upcoming flu season. But even when the vaccine doesnt exactly match these viruses, it may still provide some protection    Flu vaccine cannot prevent:   flu that is caused by a virus not covered by the vaccine, or   illnesses that look like flu but are not. It takes about 2 weeks for protection to develop after vaccination, and protection lasts through the flu season. 3. Some people should not get this vaccine    Tell the person who is giving you the vaccine:     If you have any severe, life-threatening allergies. If you ever had a life-threatening allergic reaction after a dose of flu vaccine, or have a severe allergy to any part of this vaccine, you may be advised not to get vaccinated. Most, but not all, types of flu vaccine contain a small amount of egg protein.  If you ever had Guillain-Barré Syndrome (also called GBS). Some people with a history of GBS should not get this vaccine. This should be discussed with your doctor.  If you are not feeling well. It is usually okay to get flu vaccine when you have a mild illness, but you might be asked to come back when you feel better. 4. Risks of a vaccine reaction    With any medicine, including vaccines, there is a chance of reactions. These are usually mild and go away on their own, but serious reactions are also possible. Most people who get a flu shot do not have any problems with it.      Minor problems following a flu shot include:    soreness, redness, or swelling where the shot was given     hoarseness   sore, red or itchy eyes   cough   fever   aches   headache   itching   fatigue  If these problems occur, they usually begin soon after the shot and last 1 or 2 days. More serious problems following a flu shot can include the following:     There may be a small increased risk of Guillain-Barré Syndrome (GBS) after inactivated flu vaccine. This risk has been estimated at 1 or 2 additional cases per million people vaccinated. This is much lower than the risk of severe complications from flu, which can be prevented by flu vaccine.  Young children who get the flu shot along with pneumococcal vaccine (PCV13) and/or DTaP vaccine at the same time might be slightly more likely to have a seizure caused by fever. Ask your doctor for more information. Tell your doctor if a child who is getting flu vaccine has ever had a seizure. Problems that could happen after any injected vaccine:      People sometimes faint after a medical procedure, including vaccination. Sitting or lying down for about 15 minutes can help prevent fainting, and injuries caused by a fall. Tell your doctor if you feel dizzy, or have vision changes or ringing in the ears.  Some people get severe pain in the shoulder and have difficulty moving the arm where a shot was given. This happens very rarely.  Any medication can cause a severe allergic reaction. Such reactions from a vaccine are very rare, estimated at about 1 in a million doses, and would happen within a few minutes to a few hours after the vaccination. As with any medicine, there is a very remote chance of a vaccine causing a serious injury or death. The safety of vaccines is always being monitored. For more information, visit: www.cdc.gov/vaccinesafety/    5. What if there is a serious reaction? What should I look for?  Look for anything that concerns you, such as signs of a severe allergic reaction, very high fever, or unusual behavior.     Signs of a severe allergic reaction can include hives, swelling of the face and throat, difficulty breathing, a fast heartbeat, dizziness, and weakness - usually within a few minutes to a few hours after the vaccination. What should I do?  If you think it is a severe allergic reaction or other emergency that cant wait, call 9-1-1 and get the person to the nearest hospital. Otherwise, call your doctor.  Reactions should be reported to the Vaccine Adverse Event Reporting System (VAERS). Your doctor should file this report, or you can do it yourself through  the VAERS web site at www.vaers. [facility].gov, or by calling 6-677.881.8363. VAERS does not give medical advice. 6. The National Vaccine Injury Compensation Program    The [facility] Vaccine Injury Compensation Program (VICP) is a federal program that was created to compensate people who may have been injured by certain vaccines. Persons who believe they may have been injured by a vaccine can learn about the program and about filing a claim by calling 1-610.570.1353 or visiting the [employer] website at www.[facility].gov/vaccinecompensation. There is a time limit to file a claim for compensation. 7. How can I learn more?  Ask your healthcare provider. He or she can give you the vaccine package insert or suggest other sources of information.  Call your local or state health department.  Contact the Centers for Disease Control and Prevention (CDC):  - Call 0-731.412.4724 (1-800-CDC-INFO) or  - Visit CDCs website at www.cdc.gov/flu    Vaccine Information Statement   Inactivated Influenza Vaccine   8/7/2015  42 TRISHA Liliam Jori [id number]    Department of Health and Human Services  Centers for Disease Control and Prevention    Office Use Only

## 2017-10-03 NOTE — PROGRESS NOTES
HISTORY OF PRESENT ILLNESS  Carmen Mcgee is a 41 y.o. female. HPI  Pt presents with \"left knee pain x 3 days\"    About 3 days ago, she noted the the front of her left knee was sore to the touch. No erythema, no swelling in the area at that time. Since then, the area of soreness has increased. She has noted that pain is worse with standing, as well as bending of the knee. Knee is now swollen in the front of the knee. No erythema, no warmth tot he area. She has no recollection of falling, hitting the knee, etc.  She has been taking Ibuprofen, with little to no relief. Review of Systems   Constitutional: Negative for fever. HENT: Negative for congestion. Respiratory: Negative for cough. Musculoskeletal: Positive for joint pain. Physical Exam   Constitutional: She is oriented to person, place, and time. She appears well-developed and well-nourished. HENT:   Head: Normocephalic and atraumatic. Cardiovascular: Normal rate, regular rhythm and normal heart sounds. Pulmonary/Chest: Effort normal and breath sounds normal.   Musculoskeletal:        Left knee: She exhibits decreased range of motion and swelling. She exhibits no ecchymosis and no erythema. Tenderness found. Neurological: She is alert and oriented to person, place, and time. Skin: Skin is warm and dry. Psychiatric: She has a normal mood and affect. Her behavior is normal.       ASSESSMENT and PLAN    ICD-10-CM ICD-9-CM    1. Acute pain of left knee M25.562 719.46 methylPREDNISolone (MEDROL DOSEPACK) 4 mg tablet      diclofenac EC (VOLTAREN) 75 mg EC tablet      XR KNEE LT MAX 2 VWS   2. Encounter for immunization Z23 V03.89 INFLUENZA VIRUS VAC QUAD,SPLIT,PRESV FREE SYRINGE IM     Informed patient that I have sent medication to the pharmacy, and she should take as prescribed. Educated about getting x-ray, should pain and swelling continue after a couple days of taking medication. Educated about [address]     Pt informed to return to office with worsening of symptoms, or PRN with any questions or concerns. Pt verbalizes understanding of plan of care and denies further questions or concerns at this time.

## 2017-10-09 DIAGNOSIS — R51.9 HEADACHE, UNSPECIFIED HEADACHE TYPE: ICD-10-CM

## 2017-10-09 RX ORDER — BUTALBITAL, ACETAMINOPHEN AND CAFFEINE 50; 325; 40 MG/1; MG/1; MG/1
TABLET ORAL
Qty: 30 TAB | Refills: 0 | Status: SHIPPED | OUTPATIENT
Start: 2017-10-09 | End: 2017-11-20 | Stop reason: SDUPTHER

## 2017-10-25 DIAGNOSIS — R51.9 HEADACHE, UNSPECIFIED HEADACHE TYPE: ICD-10-CM

## 2017-10-25 RX ORDER — BUTALBITAL, ACETAMINOPHEN AND CAFFEINE 50; 325; 40 MG/1; MG/1; MG/1
TABLET ORAL
Qty: 30 TAB | Refills: 0 | OUTPATIENT
Start: 2017-10-25

## 2017-10-25 RX ORDER — BUTALBITAL, ACETAMINOPHEN AND CAFFEINE 50; 325; 40 MG/1; MG/1; MG/1
1 TABLET ORAL
OUTPATIENT
Start: 2017-10-25

## 2017-10-25 NOTE — TELEPHONE ENCOUNTER
Pt should not needs refills this quickly. Should she need more medication, should be seen by neurology. Thanks!

## 2017-10-29 RX ORDER — TOPIRAMATE 100 MG/1
TABLET, FILM COATED ORAL
Qty: 60 TAB | Refills: 5 | Status: SHIPPED | OUTPATIENT
Start: 2017-10-29 | End: 2018-04-28 | Stop reason: SDUPTHER

## 2017-11-20 ENCOUNTER — OFFICE VISIT (OUTPATIENT)
Dept: FAMILY MEDICINE CLINIC | Age: 47
End: 2017-11-20

## 2017-11-20 VITALS
OXYGEN SATURATION: 97 % | TEMPERATURE: 98.1 F | RESPIRATION RATE: 18 BRPM | HEART RATE: 86 BPM | HEIGHT: 63 IN | BODY MASS INDEX: 26.75 KG/M2 | SYSTOLIC BLOOD PRESSURE: 118 MMHG | DIASTOLIC BLOOD PRESSURE: 72 MMHG | WEIGHT: 151 LBS

## 2017-11-20 DIAGNOSIS — J31.0 RHINOSINUSITIS: ICD-10-CM

## 2017-11-20 DIAGNOSIS — R51.9 HEADACHE, UNSPECIFIED HEADACHE TYPE: ICD-10-CM

## 2017-11-20 DIAGNOSIS — J32.9 RHINOSINUSITIS: ICD-10-CM

## 2017-11-20 DIAGNOSIS — H65.01 RIGHT ACUTE SEROUS OTITIS MEDIA, RECURRENCE NOT SPECIFIED: Primary | ICD-10-CM

## 2017-11-20 RX ORDER — AMOXICILLIN AND CLAVULANATE POTASSIUM 875; 125 MG/1; MG/1
1 TABLET, FILM COATED ORAL EVERY 12 HOURS
Qty: 14 TAB | Refills: 0 | Status: SHIPPED | OUTPATIENT
Start: 2017-11-20 | End: 2017-11-27

## 2017-11-20 RX ORDER — BUTALBITAL, ACETAMINOPHEN AND CAFFEINE 50; 325; 40 MG/1; MG/1; MG/1
TABLET ORAL
Qty: 30 TAB | Refills: 0 | Status: SHIPPED | OUTPATIENT
Start: 2017-11-20 | End: 2018-01-31 | Stop reason: SDUPTHER

## 2017-11-20 NOTE — PATIENT INSTRUCTIONS
Sinusitis: Care Instructions  Your Care Instructions    Sinusitis is an infection of the lining of the sinus cavities in your head. Sinusitis often follows a cold. It causes pain and pressure in your head and face. In most cases, sinusitis gets better on its own in 1 to 2 weeks. But some mild symptoms may last for several weeks. Sometimes antibiotics are needed. Follow-up care is a key part of your treatment and safety. Be sure to make and go to all appointments, and call your doctor if you are having problems. It's also a good idea to know your test results and keep a list of the medicines you take. How can you care for yourself at home? · Take an over-the-counter pain medicine, such as acetaminophen (Tylenol), ibuprofen (Advil, Motrin), or naproxen (Aleve). Read and follow all instructions on the label. · If the doctor prescribed antibiotics, take them as directed. Do not stop taking them just because you feel better. You need to take the full course of antibiotics. · Be careful when taking over-the-counter cold or flu medicines and Tylenol at the same time. Many of these medicines have acetaminophen, which is Tylenol. Read the labels to make sure that you are not taking more than the recommended dose. Too much acetaminophen (Tylenol) can be harmful. · Breathe warm, moist air from a steamy shower, a hot bath, or a sink filled with hot water. Avoid cold, dry air. Using a humidifier in your home may help. Follow the directions for cleaning the machine. · Use saline (saltwater) nasal washes to help keep your nasal passages open and wash out mucus and bacteria. You can buy saline nose drops at a grocery store or drugstore. Or you can make your own at home by adding 1 teaspoon of salt and 1 teaspoon of baking soda to 2 cups of distilled water. If you make your own, fill a bulb syringe with the solution, insert the tip into your nostril, and squeeze gently. Bharat Cortesderick your nose.   · Put a hot, wet towel or a warm gel pack on your face 3 or 4 times a day for 5 to 10 minutes each time. · Try a decongestant nasal spray like oxymetazoline (Afrin). Do not use it for more than 3 days in a row. Using it for more than 3 days can make your congestion worse. When should you call for help? Call your doctor now or seek immediate medical care if:  ? · You have new or worse swelling or redness in your face or around your eyes. ? · You have a new or higher fever. ? Watch closely for changes in your health, and be sure to contact your doctor if:  ? · You have new or worse facial pain. ? · The mucus from your nose becomes thicker (like pus) or has new blood in it. ? · You are not getting better as expected. Where can you learn more? Go to http://smooth-connie.info/. Enter N184 in the search box to learn more about \"Sinusitis: Care Instructions. \"  Current as of: May 12, 2017  Content Version: 11.4  © 4860-3983 [employer]. Care instructions adapted under license by [employer] (which disclaims liability or warranty for this information). If you have questions about a medical condition or this instruction, always ask your healthcare professional. [place] [address] any warranty or liability for your use of this information. Ear Infection (Otitis Media): Care Instructions  Your Care Instructions    An ear infection may start with a cold and affect the middle ear (otitis media). It can hurt a lot. Most ear infections clear up on their own in a couple of days. Most often you will not need antibiotics. This is because many ear infections are caused by a virus. Antibiotics don't work against a virus. Regular doses of pain medicines are the best way to reduce your fever and help you feel better. Follow-up care is a key part of your treatment and safety. Be sure to make and go to all appointments, and call your doctor if you are having problems.  It's also a good idea to know your test results and keep a list of the medicines you take. How can you care for yourself at home? · Take pain medicines exactly as directed. ¨ If the doctor gave you a prescription medicine for pain, take it as prescribed. ¨ If you are not taking a prescription pain medicine, take an over-the-counter medicine, such as acetaminophen (Tylenol), ibuprofen (Advil, Motrin), or naproxen (Aleve). Read and follow all instructions on the label. ¨ Do not take two or more pain medicines at the same time unless the doctor told you to. Many pain medicines have acetaminophen, which is Tylenol. Too much acetaminophen (Tylenol) can be harmful. · Plan to take a full dose of pain reliever before bedtime. Getting enough sleep will help you get better. · Try a warm, moist washcloth on the ear. It may help relieve pain. · If your doctor prescribed antibiotics, take them as directed. Do not stop taking them just because you feel better. You need to take the full course of antibiotics. When should you call for help? Call your doctor now or seek immediate medical care if:  ? · You have new or increasing ear pain. ? · You have new or increasing pus or blood draining from your ear. ? · You have a fever with a stiff neck or a severe headache. ? Watch closely for changes in your health, and be sure to contact your doctor if:  ? · You have new or worse symptoms. ? · You are not getting better after taking an antibiotic for 2 days. Where can you learn more? Go to http://smooth-connie.info/. Enter P924 in the search box to learn more about \"Ear Infection (Otitis Media): Care Instructions. \"  Current as of: May 12, 2017  Content Version: 11.4  © 9597-8999 [employer]. Care instructions adapted under license by [employer] (which disclaims liability or warranty for this information).  If you have questions about a medical condition or this instruction, always ask your healthcare professional. [address] any warranty or liability for your use of this information.

## 2017-11-20 NOTE — PROGRESS NOTES
Subjective:      Sergey Campos is a 41 y.o. female here with complaint of productive cough, bilateral sinus pain, right ear pain, hearing loss and suspected fevers but not measured at home and hot and cold spells for 6 days. She denies a history of {hx resp sx additional:[id number]. Evaluation to date: none  Treatment to date: Mucinex, Dayquil       Current Outpatient Prescriptions   Medication Sig Dispense Refill    amoxicillin-clavulanate (AUGMENTIN) 875-125 mg per tablet Take 1 Tab by mouth every twelve (12) hours for 7 days. 14 Tab 0    butalbital-acetaminophen-caffeine (FIORICET, ESGIC) -40 mg per tablet TAKE 1 TABLET BY MOUTH EVERY 6 HOURS AS NEEDED FOR HEADACHE. MAX DAILY AMOUNT 4 TABS 30 Tab 0    topiramate (TOPAMAX) 100 mg tablet TAKE 1 TAB BY MOUTH TWO (2) TIMES A DAY. 60 Tab 5    nortriptyline (PAMELOR) 25 mg capsule TAKE 1-2 CAPSULES BY MOUTH AT BEDTIME 60 Cap 5    guaiFENesin-codeine (ROBITUSSIN AC) 100-10 mg/5 mL solution Take 5 mL by mouth nightly as needed for Cough. Max Daily Amount: 5 mL. 118 mL 0    gabapentin (NEURONTIN) 800 mg tablet TAKE 1 TABLET BY MOUTH THREE TIMES DAILY 90 Tab 4    buPROPion XL (WELLBUTRIN XL) 300 mg XL tablet TAKE 1 TAB BY MOUTH EVERY MORNING. 30 Tab 4    cyclobenzaprine (FLEXERIL) 10 mg tablet TAKE 1 TABLET BY MOUTH EACH NIGHT AT BEDTIME 30 Tab 3    cetirizine (ZYRTEC) 10 mg tablet Take 1 Tab by mouth daily.  30 Tab 3    SUMAtriptan (IMITREX) 100 mg tablet TAKE 1 TABLET BY MOUTH ONCE AS NEEDED FOR MIGRAINE FOR UP TO 9 DOSES 9 Tab 5       Allergies   Allergen Reactions    Latex Rash    Erythromycin Nausea and Vomiting    Vancomycin Swelling       Past Medical History:   Diagnosis Date    Cervicalgia     Chronic neck pain 4/3/2013    Migraine with aura, without mention of intractable migraine without mention of status migrainosus     Moderate episode of recurrent major depressive disorder ([facility] [address] 11/2/2016       Social History   Substance Use Topics    Smoking status: Never Smoker    Smokeless tobacco: Never Used    Alcohol use No          Review of Systems  Pertinent items are noted in HPI. Objective:     Visit Vitals    /72 (BP 1 Location: Right arm, BP Patient Position: Sitting)  Comment: manual    Pulse 86    Temp 98.1 °F (36.7 °C) (Oral)    Resp 18    Ht 5' 3\" (1.6 m)    Wt 151 lb (68.5 kg)    LMP 01/09/2010    SpO2 97%    BMI 26.75 kg/m2      General appearance - alert, well appearing, and in no distress  Eyes - pupils equal and reactive, extraocular eye movements intact, sclera anicteric  Ears - right TM red, dull, left TM normal  Nose - mucosal congestion, mucosal erythema and sinus tenderness noted frontal and maxillary sinuses   Oropharyngx - mucous membranes moist, pharynx normal without lesions  Neck - supple, no significant adenopathy  Chest - clear to auscultation, no wheezes, rales or rhonchi, symmetric air entry, no tachypnea, retractions or cyanosis  Heart - normal rate, regular rhythm, normal S1, S2, no murmurs, rubs, clicks or gallops    Assessment/Plan:   Nigel Castillo is a 41 y.o. female seen for:     1. Right acute serous otitis media, recurrence not specified: with concurrent sinus infection. Treat with Augmentin as below. - amoxicillin-clavulanate (AUGMENTIN) 875-125 mg per tablet; Take 1 Tab by mouth every twelve (12) hours for 7 days. Dispense: 14 Tab; Refill: 0  - OTC analgesic of choice for pain    2. Rhinosinusitis  - amoxicillin-clavulanate (AUGMENTIN) 875-125 mg per tablet; Take 1 Tab by mouth every twelve (12) hours for 7 days. Dispense: 14 Tab; Refill: 0  - nasal saline spray as needed for congestion    3. Headache, unspecified headache type:   - butalbital-acetaminophen-caffeine (FIORICET, ESGIC) -40 mg per tablet; TAKE 1 TABLET BY MOUTH EVERY 6 HOURS AS NEEDED FOR HEADACHE. MAX DAILY AMOUNT 4 TABS  Dispense: 30 Tab;  Refill: 0    I have discussed the diagnosis with the patient and the intended plan as seen in the above orders. The patient has received an after-visit summary and questions were answered concerning future plans. I have discussed medication side effects and warnings with the patient as well. Patient verbalizes understanding of plan of care and denies further questions or concerns at this time. Informed patient to return to the office if symptoms worsen or if new symptoms arise. Follow-up Disposition:  Return if symptoms worsen or fail to improve.

## 2017-11-20 NOTE — MR AVS SNAPSHOT
Visit Information Date & Time Provider Department Dept. Phone Encounter #  
 11/20/2017 11:30 AM Logan PerrinPatrick [phone] [id number] Follow-up Instructions Return if symptoms worsen or fail to improve. Upcoming Health Maintenance Date Due  
 PAP AKA CERVICAL CYTOLOGY 8/31/2018 DTaP/Tdap/Td series (2 - Td) 8/31/2025 Allergies as of 11/20/2017  Review Complete On: 11/20/2017 By: Logan Perrin MD  
  
 Severity Noted Reaction Type Reactions Latex  11/20/2017    Rash Erythromycin  01/21/2015    Nausea and Vomiting Vancomycin  12/02/2013    Swelling Current Immunizations  Reviewed on 10/3/2017 Name Date Influenza Vaccine (Quad) PF 10/3/2017 11:00 AM, 11/2/2016 Tdap 8/31/2015 12:12 PM  
  
 Not reviewed this visit You Were Diagnosed With   
  
 Codes Comments Right acute serous otitis media, recurrence not specified    -  Primary ICD-10-CM: H65.01 
ICD-9-CM: 381.01 Rhinosinusitis     ICD-10-CM: J32.9 ICD-9-CM: 473.9 Headache, unspecified headache type     ICD-10-CM: R51 ICD-9-CM: 863. 0 Vitals BP Pulse Temp Resp Height(growth percentile) Weight(growth percentile) 118/72 (BP 1 Location: Right arm, BP Patient Position: Sitting) 86 98.1 °F (36.7 °C) (Oral) 18 5' 3\" (1.6 m) 151 lb (68.5 kg) LMP SpO2 BMI OB Status Smoking Status 01/09/2010 97% 26.75 kg/m2 Hysterectomy Never Smoker Vitals History BMI and BSA Data Body Mass Index Body Surface Area  
 26.75 kg/m 2 1.74 m 2 Preferred Pharmacy Pharmacy Name Phone [facility]/PHARMACY #[id number] - [facility] - [address] [phone] Your Updated Medication List  
  
   
This list is accurate as of: 11/20/17 12:06 PM.  Always use your most recent med list.  
  
  
  
  
 amoxicillin-clavulanate 875-125 mg per tablet Commonly known as:  AUGMENTIN Take 1 Tab by mouth every twelve (12) hours for 7 days. buPROPion  mg XL tablet Commonly known as:  WELLBUTRIN XL  
TAKE 1 TAB BY MOUTH EVERY MORNING.  
  
 butalbital-acetaminophen-caffeine -40 mg per tablet Commonly known as:  FIORICET, ESGIC  
TAKE 1 TABLET BY MOUTH EVERY 6 HOURS AS NEEDED FOR HEADACHE. MAX DAILY AMOUNT 4 TABS  
  
 cetirizine 10 mg tablet Commonly known as:  ZyrTEC Take 1 Tab by mouth daily. cyclobenzaprine 10 mg tablet Commonly known as:  FLEXERIL  
TAKE 1 TABLET BY MOUTH EACH NIGHT AT BEDTIME  
  
 gabapentin 800 mg tablet Commonly known as:  NEURONTIN  
TAKE 1 TABLET BY MOUTH THREE TIMES DAILY  
  
 guaiFENesin-codeine 100-10 mg/5 mL solution Commonly known as:  ROBITUSSIN AC Take 5 mL by mouth nightly as needed for Cough. Max Daily Amount: 5 mL.  
  
 nortriptyline 25 mg capsule Commonly known as:  PAMELOR  
TAKE 1-2 CAPSULES BY MOUTH AT BEDTIME  
  
 SUMAtriptan 100 mg tablet Commonly known as:  IMITREX  
TAKE 1 TABLET BY MOUTH ONCE AS NEEDED FOR MIGRAINE FOR UP TO 9 DOSES  
  
 topiramate 100 mg tablet Commonly known as:  TOPAMAX TAKE 1 TAB BY MOUTH TWO (2) TIMES A DAY. Prescriptions Printed Refills  
 butalbital-acetaminophen-caffeine (FIORICET, ESGIC) -40 mg per tablet 0 Sig: TAKE 1 TABLET BY MOUTH EVERY 6 HOURS AS NEEDED FOR HEADACHE. MAX DAILY AMOUNT 4 TABS Class: Print Prescriptions Sent to Pharmacy Refills  
 amoxicillin-clavulanate (AUGMENTIN) 875-125 mg per tablet 0 Sig: Take 1 Tab by mouth every twelve (12) hours for 7 days. Class: Normal  
 Pharmacy: [facility]/pharmacy #[id number] - [address]  #: [phone] Route: Oral  
  
Follow-up Instructions Return if symptoms worsen or fail to improve. Patient Instructions Sinusitis: Care Instructions Your Care Instructions Sinusitis is an infection of the lining of the sinus cavities in your head. Sinusitis often follows a cold. It causes pain and pressure in your head and face. In most cases, sinusitis gets better on its own in 1 to 2 weeks. But some mild symptoms may last for several weeks. Sometimes antibiotics are needed. Follow-up care is a key part of your treatment and safety. Be sure to make and go to all appointments, and call your doctor if you are having problems. It's also a good idea to know your test results and keep a list of the medicines you take. How can you care for yourself at home? · Take an over-the-counter pain medicine, such as acetaminophen (Tylenol), ibuprofen (Advil, Motrin), or naproxen (Aleve). Read and follow all instructions on the label. · If the doctor prescribed antibiotics, take them as directed. Do not stop taking them just because you feel better. You need to take the full course of antibiotics. · Be careful when taking over-the-counter cold or flu medicines and Tylenol at the same time. Many of these medicines have acetaminophen, which is Tylenol. Read the labels to make sure that you are not taking more than the recommended dose. Too much acetaminophen (Tylenol) can be harmful. · Breathe warm, moist air from a steamy shower, a hot bath, or a sink filled with hot water. Avoid cold, dry air. Using a humidifier in your home may help. Follow the directions for cleaning the machine. · Use saline (saltwater) nasal washes to help keep your nasal passages open and wash out mucus and bacteria. You can buy saline nose drops at a grocery store or drugstore. Or you can make your own at home by adding 1 teaspoon of salt and 1 teaspoon of baking soda to 2 cups of distilled water. If you make your own, fill a bulb syringe with the solution, insert the tip into your nostril, and squeeze gently. Sidonie Mimi your nose. · Put a hot, wet towel or a warm gel pack on your face 3 or 4 times a day for 5 to 10 minutes each time. · Try a decongestant nasal spray like oxymetazoline (Afrin). Do not use it for more than 3 days in a row. Using it for more than 3 days can make your congestion worse. When should you call for help? Call your doctor now or seek immediate medical care if: 
? · You have new or worse swelling or redness in your face or around your eyes. ? · You have a new or higher fever. ? Watch closely for changes in your health, and be sure to contact your doctor if: 
? · You have new or worse facial pain. ? · The mucus from your nose becomes thicker (like pus) or has new blood in it. ? · You are not getting better as expected. Where can you learn more? Go to http://smooth-connie.info/. Enter G437 in the search box to learn more about \"Sinusitis: Care Instructions. \" Current as of: May 12, 2017 Content Version: 11.4 © 9896-3232 [employer]. Care instructions adapted under license by [employer] (which disclaims liability or warranty for this information). If you have questions about a medical condition or this instruction, always ask your healthcare professional. [place] [address] any warranty or liability for your use of this information. Ear Infection (Otitis Media): Care Instructions Your Care Instructions An ear infection may start with a cold and affect the middle ear (otitis media). It can hurt a lot. Most ear infections clear up on their own in a couple of days. Most often you will not need antibiotics. This is because many ear infections are caused by a virus. Antibiotics don't work against a virus. Regular doses of pain medicines are the best way to reduce your fever and help you feel better. Follow-up care is a key part of your treatment and safety. Be sure to make and go to all appointments, and call your doctor if you are having problems. It's also a good idea to know your test results and keep a list of the medicines you take. How can you care for yourself at home? · Take pain medicines exactly as directed. ¨ If the doctor gave you a prescription medicine for pain, take it as prescribed. ¨ If you are not taking a prescription pain medicine, take an over-the-counter medicine, such as acetaminophen (Tylenol), ibuprofen (Advil, Motrin), or naproxen (Aleve). Read and follow all instructions on the label. ¨ Do not take two or more pain medicines at the same time unless the doctor told you to. Many pain medicines have acetaminophen, which is Tylenol. Too much acetaminophen (Tylenol) can be harmful. · Plan to take a full dose of pain reliever before bedtime. Getting enough sleep will help you get better. · Try a warm, moist washcloth on the ear. It may help relieve pain. · If your doctor prescribed antibiotics, take them as directed. Do not stop taking them just because you feel better. You need to take the full course of antibiotics. When should you call for help? Call your doctor now or seek immediate medical care if: 
? · You have new or increasing ear pain. ? · You have new or increasing pus or blood draining from your ear. ? · You have a fever with a stiff neck or a severe headache. ? Watch closely for changes in your health, and be sure to contact your doctor if: 
? · You have new or worse symptoms. ? · You are not getting better after taking an antibiotic for 2 days. Where can you learn more? Go to http://smooth-connie.info/. Enter H756 in the search box to learn more about \"Ear Infection (Otitis Media): Care Instructions. \" Current as of: May 12, 2017 Content Version: 11.4 © 5506-3188 [employer]. Care instructions adapted under license by [employer] (which disclaims liability or warranty for this information).  If you have questions about a medical condition or this instruction, always ask your healthcare professional. [employer] disclaims any warranty or liability for your use of this information. Introducing [facility]! Toi Quinterory introduces PK Clean patient portal. Now you can access parts of your medical record, email your doctor's office, and request medication refills online. 1. In your internet browser, go to [email or web address] 2. Click on the First Time User? Click Here link in the Sign In box. You will see the New Member Sign Up page. 3. Enter your PK Clean Access Code exactly as it appears below. You will not need to use this code after youve completed the sign-up process. If you do not sign up before the expiration date, you must request a new code. · PK Clean Access Code: [email or web address] Expires: 1/1/2018 10:24 AM 
 
4. Enter the last four digits of your Social Security Number (xxxx) and Date of Birth (mm/dd/yyyy) as indicated and click Submit. You will be taken to the next sign-up page. 5. Create a PK Clean ID. This will be your PK Clean login ID and cannot be changed, so think of one that is secure and easy to remember. 6. Create a PK Clean password. You can change your password at any time. 7. Enter your Password Reset Question and Answer. This can be used at a later time if you forget your password. 8. Enter your e-mail address. You will receive e-mail notification when new information is available in [address] 9. Click Sign Up. You can now view and download portions of your medical record. 10. Click the Download Summary menu link to download a portable copy of your medical information. If you have questions, please visit the Frequently Asked Questions section of the PK Clean website. Remember, PK Clean is NOT to be used for urgent needs. For medical emergencies, dial 911. Now available from your iPhone and Android! Please provide this summary of care documentation to your next provider. Your primary care clinician is listed as Arianne Burleson. If you have any questions after today's visit, please call [phone].

## 2018-01-31 ENCOUNTER — OFFICE VISIT (OUTPATIENT)
Dept: FAMILY MEDICINE CLINIC | Age: 48
End: 2018-01-31

## 2018-01-31 VITALS
RESPIRATION RATE: 16 BRPM | HEIGHT: 63 IN | WEIGHT: 149 LBS | TEMPERATURE: 98.4 F | HEART RATE: 100 BPM | SYSTOLIC BLOOD PRESSURE: 116 MMHG | DIASTOLIC BLOOD PRESSURE: 80 MMHG | BODY MASS INDEX: 26.4 KG/M2 | OXYGEN SATURATION: 99 %

## 2018-01-31 DIAGNOSIS — R51.9 HEADACHE, UNSPECIFIED HEADACHE TYPE: ICD-10-CM

## 2018-01-31 DIAGNOSIS — G43.509 PERSISTENT MIGRAINE AURA WITHOUT CEREBRAL INFARCTION AND WITHOUT STATUS MIGRAINOSUS, NOT INTRACTABLE: ICD-10-CM

## 2018-01-31 DIAGNOSIS — J01.00 ACUTE NON-RECURRENT MAXILLARY SINUSITIS: Primary | ICD-10-CM

## 2018-01-31 RX ORDER — AZITHROMYCIN 250 MG/1
TABLET, FILM COATED ORAL
Qty: 6 TAB | Refills: 0 | Status: SHIPPED | OUTPATIENT
Start: 2018-01-31 | End: 2018-02-05

## 2018-01-31 RX ORDER — SUMATRIPTAN 100 MG/1
TABLET, FILM COATED ORAL
Qty: 9 TAB | Refills: 5 | Status: SHIPPED | OUTPATIENT
Start: 2018-01-31 | End: 2018-08-15 | Stop reason: SDUPTHER

## 2018-01-31 RX ORDER — BUTALBITAL, ACETAMINOPHEN AND CAFFEINE 50; 325; 40 MG/1; MG/1; MG/1
TABLET ORAL
Qty: 30 TAB | Refills: 0 | Status: SHIPPED | OUTPATIENT
Start: 2018-01-31 | End: 2018-04-20 | Stop reason: SDUPTHER

## 2018-01-31 NOTE — PROGRESS NOTES
Chief Complaint   Patient presents with    Headache     started with headache 2 days ago. Imitrex not helping    Ear Pain     right ear hurting for 2 days     \"REVIEWED RECORD IN PREPARATION FOR VISIT AND HAVE OBTAINED THE NECESSARY DOCUMENTATION\"  1. Have you been to the ER, urgent care clinic since your last visit? Hospitalized since your last visit? No    2. Have you seen or consulted any other health care providers outside of the [address] [place] [address] since your last visit? Include any pap smears or colon screening. No  Patient does not have advanced directives.

## 2018-01-31 NOTE — PATIENT INSTRUCTIONS
Sinusitis: Care Instructions  Your Care Instructions    Sinusitis is an infection of the lining of the sinus cavities in your head. Sinusitis often follows a cold. It causes pain and pressure in your head and face. In most cases, sinusitis gets better on its own in 1 to 2 weeks. But some mild symptoms may last for several weeks. Sometimes antibiotics are needed. Follow-up care is a key part of your treatment and safety. Be sure to make and go to all appointments, and call your doctor if you are having problems. It's also a good idea to know your test results and keep a list of the medicines you take. How can you care for yourself at home? · Take an over-the-counter pain medicine, such as acetaminophen (Tylenol), ibuprofen (Advil, Motrin), or naproxen (Aleve). Read and follow all instructions on the label. · If the doctor prescribed antibiotics, take them as directed. Do not stop taking them just because you feel better. You need to take the full course of antibiotics. · Be careful when taking over-the-counter cold or flu medicines and Tylenol at the same time. Many of these medicines have acetaminophen, which is Tylenol. Read the labels to make sure that you are not taking more than the recommended dose. Too much acetaminophen (Tylenol) can be harmful. · Breathe warm, moist air from a steamy shower, a hot bath, or a sink filled with hot water. Avoid cold, dry air. Using a humidifier in your home may help. Follow the directions for cleaning the machine. · Use saline (saltwater) nasal washes to help keep your nasal passages open and wash out mucus and bacteria. You can buy saline nose drops at a grocery store or drugstore. Or you can make your own at home by adding 1 teaspoon of salt and 1 teaspoon of baking soda to 2 cups of distilled water. If you make your own, fill a bulb syringe with the solution, insert the tip into your nostril, and squeeze gently. Jose Evener your nose.   · Put a hot, wet towel or a warm gel pack on your face 3 or 4 times a day for 5 to 10 minutes each time. · Try a decongestant nasal spray like oxymetazoline (Afrin). Do not use it for more than 3 days in a row. Using it for more than 3 days can make your congestion worse. When should you call for help? Call your doctor now or seek immediate medical care if:  ? · You have new or worse swelling or redness in your face or around your eyes. ? · You have a new or higher fever. ? Watch closely for changes in your health, and be sure to contact your doctor if:  ? · You have new or worse facial pain. ? · The mucus from your nose becomes thicker (like pus) or has new blood in it. ? · You are not getting better as expected. Where can you learn more? Go to http://smooth-connie.info/. Enter S993 in the search box to learn more about \"Sinusitis: Care Instructions. \"  Current as of: May 12, 2017  Content Version: 11.4  © 5536-2783 [employer]. Care instructions adapted under license by Where (which disclaims liability or warranty for this information). If you have questions about a medical condition or this instruction, always ask your healthcare professional. [address] any warranty or liability for your use of this information.

## 2018-01-31 NOTE — PROGRESS NOTES
HISTORY OF PRESENT ILLNESS  Martha Burkitt is a 41 y.o. female. HPI  Pt presents with \"headache and ear pain\"    Symptoms started Monday (2 days ago)  Sinus headache  Sinus congestion  Ear pain, right more so then left  Tinnitus  No fever  OTC: Tylenol/Ibuprofen  Review of Systems   Constitutional: Negative for fever. HENT: Positive for congestion, ear pain and sinus pain. Respiratory: Negative for cough. Gastrointestinal: Negative for diarrhea and vomiting. Physical Exam   Constitutional: She is oriented to person, place, and time. She appears well-developed and well-nourished. HENT:   Head: Normocephalic and atraumatic. Right Ear: Hearing, external ear and ear canal normal. Tympanic membrane is erythematous. Left Ear: Hearing, tympanic membrane, external ear and ear canal normal.   Nose: Mucosal edema and rhinorrhea present. Right sinus exhibits frontal sinus tenderness. Left sinus exhibits frontal sinus tenderness. Mouth/Throat: Oropharynx is clear and moist.   Neck: Normal range of motion. Neck supple. Cardiovascular: Normal rate, regular rhythm and normal heart sounds. Pulmonary/Chest: Effort normal and breath sounds normal.   Lymphadenopathy:     She has no cervical adenopathy. Neurological: She is alert and oriented to person, place, and time. Skin: Skin is warm and dry. Psychiatric: She has a normal mood and affect. Her behavior is normal.       ASSESSMENT and PLAN    ICD-10-CM ICD-9-CM    1. Acute non-recurrent maxillary sinusitis J01.00 461.0 azithromycin (ZITHROMAX) 250 mg tablet   2. Headache, unspecified headache type R51 784.0 butalbital-acetaminophen-caffeine (FIORICET, ESGIC) -40 mg per tablet     Informed patient that I have sent medication to the pharmacy, and she should take as prescribed. Educated about staying well hydrated, and taking Tylenol as needed.     Pt informed to return to office with worsening of symptoms, or PRN with any questions or concerns. Pt verbalizes understanding of plan of care and denies further questions or concerns at this time.

## 2018-01-31 NOTE — MR AVS SNAPSHOT
[address] [address] [address] [facility] 
[phone] Patient: Lulu Fernández MRN: [id number] YRX:[birth year] Visit Information Date & Time Provider Department Dept. Phone Encounter #  
 1/31/2018  2:15 PM [address] [id number] Follow-up Instructions Return if symptoms worsen or fail to improve. Upcoming Health Maintenance Date Due  
 PAP AKA CERVICAL CYTOLOGY 8/31/2018 DTaP/Tdap/Td series (2 - Td) 8/31/2025 Allergies as of 1/31/2018  Review Complete On: 1/31/2018 By: Jan Lopez NP Severity Noted Reaction Type Reactions Latex  11/20/2017    Rash Erythromycin  01/21/2015    Nausea and Vomiting Vancomycin  12/02/2013    Swelling Current Immunizations  Reviewed on 10/3/2017 Name Date Influenza Vaccine (Quad) PF 10/3/2017 11:00 AM, 11/2/2016 Tdap 8/31/2015 12:12 PM  
  
 Not reviewed this visit You Were Diagnosed With   
  
 Codes Comments Acute non-recurrent maxillary sinusitis    -  Primary ICD-10-CM: J01.00 ICD-9-CM: 461.0 Headache, unspecified headache type     ICD-10-CM: R51 ICD-9-CM: 906. 0 Vitals BP Pulse Temp Resp Height(growth percentile) Weight(growth percentile) 116/80 100 98.4 °F (36.9 °C) (Oral) 16 5' 3\" (1.6 m) 149 lb (67.6 kg) LMP SpO2 BMI OB Status Smoking Status 01/09/2010 99% 26.39 kg/m2 Hysterectomy Never Smoker Vitals History BMI and BSA Data Body Mass Index Body Surface Area  
 26.39 kg/m 2 1.73 m 2 Preferred Pharmacy Pharmacy Name Phone [id number] [address], [facility] [address] [phone] Your Updated Medication List  
  
   
This list is accurate as of: 1/31/18  2:37 PM.  Always use your most recent med list.  
  
  
  
  
 azithromycin 250 mg tablet Commonly known as:  Abdul Severance Take 2 tablets today, then take 1 tablet daily buPROPion  mg XL tablet Commonly known as:  WELLBUTRIN XL  
TAKE 1 TAB BY MOUTH EVERY MORNING.  
  
 butalbital-acetaminophen-caffeine -40 mg per tablet Commonly known as:  FIORICET, ESGIC  
TAKE 1 TABLET BY MOUTH EVERY 6 HOURS AS NEEDED FOR HEADACHE. MAX DAILY AMOUNT 4 TABS  
  
 cetirizine 10 mg tablet Commonly known as:  ZyrTEC Take 1 Tab by mouth daily. cyclobenzaprine 10 mg tablet Commonly known as:  FLEXERIL  
TAKE 1 TABLET BY MOUTH EACH NIGHT AT BEDTIME  
  
 gabapentin 800 mg tablet Commonly known as:  NEURONTIN  
TAKE 1 TABLET BY MOUTH THREE TIMES DAILY  
  
 guaiFENesin-codeine 100-10 mg/5 mL solution Commonly known as:  ROBITUSSIN AC Take 5 mL by mouth nightly as needed for Cough. Max Daily Amount: 5 mL.  
  
 nortriptyline 25 mg capsule Commonly known as:  PAMELOR  
TAKE 1-2 CAPSULES BY MOUTH AT BEDTIME  
  
 SUMAtriptan 100 mg tablet Commonly known as:  IMITREX  
TAKE 1 TABLET BY MOUTH ONCE AS NEEDED FOR MIGRAINE FOR UP TO 9 DOSES  
  
 topiramate 100 mg tablet Commonly known as:  TOPAMAX TAKE 1 TAB BY MOUTH TWO (2) TIMES A DAY. Prescriptions Printed Refills  
 butalbital-acetaminophen-caffeine (FIORICET, ESGIC) -40 mg per tablet 0 Sig: TAKE 1 TABLET BY MOUTH EVERY 6 HOURS AS NEEDED FOR HEADACHE. MAX DAILY AMOUNT 4 TABS Class: Print Prescriptions Sent to Pharmacy Refills  
 azithromycin (ZITHROMAX) 250 mg tablet 0 Sig: Take 2 tablets today, then take 1 tablet daily Class: Normal  
 Pharmacy: [facility] [address], [address] [place] [address] #: [phone] Follow-up Instructions Return if symptoms worsen or fail to improve. Patient Instructions Sinusitis: Care Instructions Your Care Instructions Sinusitis is an infection of the lining of the sinus cavities in your head. Sinusitis often follows a cold. It causes pain and pressure in your head and face. In most cases, sinusitis gets better on its own in 1 to 2 weeks. But some mild symptoms may last for several weeks. Sometimes antibiotics are needed. Follow-up care is a key part of your treatment and safety. Be sure to make and go to all appointments, and call your doctor if you are having problems. It's also a good idea to know your test results and keep a list of the medicines you take. How can you care for yourself at home? · Take an over-the-counter pain medicine, such as acetaminophen (Tylenol), ibuprofen (Advil, Motrin), or naproxen (Aleve). Read and follow all instructions on the label. · If the doctor prescribed antibiotics, take them as directed. Do not stop taking them just because you feel better. You need to take the full course of antibiotics. · Be careful when taking over-the-counter cold or flu medicines and Tylenol at the same time. Many of these medicines have acetaminophen, which is Tylenol. Read the labels to make sure that you are not taking more than the recommended dose. Too much acetaminophen (Tylenol) can be harmful. · Breathe warm, moist air from a steamy shower, a hot bath, or a sink filled with hot water. Avoid cold, dry air. Using a humidifier in your home may help. Follow the directions for cleaning the machine. · Use saline (saltwater) nasal washes to help keep your nasal passages open and wash out mucus and bacteria. You can buy saline nose drops at a grocery store or drugstore. Or you can make your own at home by adding 1 teaspoon of salt and 1 teaspoon of baking soda to 2 cups of distilled water. If you make your own, fill a bulb syringe with the solution, insert the tip into your nostril, and squeeze gently. Clance Praveena your nose. · Put a hot, wet towel or a warm gel pack on your face 3 or 4 times a day for 5 to 10 minutes each time. · Try a decongestant nasal spray like oxymetazoline (Afrin).  Do not use it for more than 3 days in a row. Using it for more than 3 days can make your congestion worse. When should you call for help? Call your doctor now or seek immediate medical care if: 
? · You have new or worse swelling or redness in your face or around your eyes. ? · You have a new or higher fever. ? Watch closely for changes in your health, and be sure to contact your doctor if: 
? · You have new or worse facial pain. ? · The mucus from your nose becomes thicker (like pus) or has new blood in it. ? · You are not getting better as expected. Where can you learn more? Go to http://smooth-connie.info/. Enter M021 in the search box to learn more about \"Sinusitis: Care Instructions. \" Current as of: May 12, 2017 Content Version: 11.4 © 6819-5005 [employer]wise, [employer]. Care instructions adapted under license by [employer] (which disclaims liability or warranty for this information). If you have questions about a medical condition or this instruction, always ask your healthcare professional. [address] any warranty or liability for your use of this information. Introducing [facility] & HEALTH SERVICES! Maritza Wayne introduces FRS patient portal. Now you can access parts of your medical record, email your doctor's office, and request medication refills online. 1. In your internet browser, go to [email or web address] 2. Click on the First Time User? Click Here link in the Sign In box. You will see the New Member Sign Up page. 3. Enter your FRS Access Code exactly as it appears below. You will not need to use this code after youve completed the sign-up process. If you do not sign up before the expiration date, you must request a new code. · FRS Access Code: [email or web address] Expires: 5/1/2018  2:37 PM 
 
4.  Enter the last four digits of your Social Security Number (xxxx) and Date of Birth (mm/dd/yyyy) as indicated and click Submit. You will be taken to the next sign-up page. 5. Create a Cognection ID. This will be your Cognection login ID and cannot be changed, so think of one that is secure and easy to remember. 6. Create a Cognection password. You can change your password at any time. 7. Enter your Password Reset Question and Answer. This can be used at a later time if you forget your password. 8. Enter your e-mail address. You will receive e-mail notification when new information is available in [address] 9. Click Sign Up. You can now view and download portions of your medical record. 10. Click the Download Summary menu link to download a portable copy of your medical information. If you have questions, please visit the Frequently Asked Questions section of the Cognection website. Remember, Cognection is NOT to be used for urgent needs. For medical emergencies, dial 911. Now available from your iPhone and Android! Please provide this summary of care documentation to your next provider. Your primary care clinician is listed as Sara Mills. If you have any questions after today's visit, please call [phone].

## 2018-02-12 ENCOUNTER — TELEPHONE (OUTPATIENT)
Dept: FAMILY MEDICINE CLINIC | Age: 48
End: 2018-02-12

## 2018-02-12 ENCOUNTER — OFFICE VISIT (OUTPATIENT)
Dept: FAMILY MEDICINE CLINIC | Age: 48
End: 2018-02-12

## 2018-02-12 VITALS
TEMPERATURE: 97.9 F | RESPIRATION RATE: 20 BRPM | BODY MASS INDEX: 27.04 KG/M2 | WEIGHT: 152.6 LBS | SYSTOLIC BLOOD PRESSURE: 90 MMHG | OXYGEN SATURATION: 97 % | DIASTOLIC BLOOD PRESSURE: 58 MMHG | HEIGHT: 63 IN | HEART RATE: 85 BPM

## 2018-02-12 DIAGNOSIS — J32.9 CHRONIC SINUSITIS, UNSPECIFIED LOCATION: Primary | ICD-10-CM

## 2018-02-12 DIAGNOSIS — H92.01 RIGHT EAR PAIN: ICD-10-CM

## 2018-02-12 RX ORDER — CEFDINIR 300 MG/1
300 CAPSULE ORAL 2 TIMES DAILY
Qty: 20 CAP | Refills: 0 | Status: SHIPPED | OUTPATIENT
Start: 2018-02-12 | End: 2018-02-22

## 2018-02-12 NOTE — MR AVS SNAPSHOT
[address] [facility] 
 
 
 [address] [facility] 
[phone] Patient: Afsaneh Zuleta MRN: [id number] WGT:1/[birth year] Visit Information Date & Time Provider Department Dept. Phone Encounter #  
 7/28/3930  7:30 PM Patrick Wright Jules [phone] [id number] Your Appointments 2/12/2018  2:30 PM  
ACUTE CARE with Sara Mills MD  
[address] [place] [address]) Appt Note: ear infection [address] [facility]  
  
   
 [address] [facility] [address] Upcoming Health Maintenance Date Due  
 PAP AKA CERVICAL CYTOLOGY 8/31/2018 DTaP/Tdap/Td series (2 - Td) 8/31/2025 Allergies as of 2/12/2018  Review Complete On: 0/56/6150 By: Sara Mills MD  
  
 Severity Noted Reaction Type Reactions Latex  11/20/2017    Rash Erythromycin  01/21/2015    Nausea and Vomiting Vancomycin  12/02/2013    Swelling Current Immunizations  Reviewed on 10/3/2017 Name Date Influenza Vaccine (Quad) PF 10/3/2017 11:00 AM, 11/2/2016 Tdap 8/31/2015 12:12 PM  
  
 Not reviewed this visit You Were Diagnosed With   
  
 Codes Comments Chronic sinusitis, unspecified location    -  Primary ICD-10-CM: J32.9 ICD-9-CM: 473.9 Right ear pain     ICD-10-CM: H92.01 
ICD-9-CM: 388.70 Vitals BP Pulse Temp Resp Height(growth percentile) Weight(growth percentile) 90/58 (BP 1 Location: Left arm, BP Patient Position: Sitting) 85 97.9 °F (36.6 °C) (Oral) 20 5' 3\" (1.6 m) 152 lb 9.6 oz (69.2 kg) LMP SpO2 BMI OB Status Smoking Status 01/09/2010 97% 27.03 kg/m2 Hysterectomy Never Smoker Vitals History BMI and BSA Data Body Mass Index Body Surface Area  
 27.03 kg/m 2 1.75 m 2 Preferred Pharmacy Pharmacy Name Phone [id number] [address] [phone] Your Updated Medication List  
  
   
This list is accurate as of: 2/12/18  2:19 PM.  Always use your most recent med list.  
  
  
  
  
 buPROPion  mg XL tablet Commonly known as:  WELLBUTRIN XL  
TAKE 1 TAB BY MOUTH EVERY MORNING.  
  
 butalbital-acetaminophen-caffeine -40 mg per tablet Commonly known as:  FIORICET, ESGIC  
TAKE 1 TABLET BY MOUTH EVERY 6 HOURS AS NEEDED FOR HEADACHE. MAX DAILY AMOUNT 4 TABS  
  
 cefdinir 300 mg capsule Commonly known as:  OMNICEF Take 1 Cap by mouth two (2) times a day for 10 days. cetirizine 10 mg tablet Commonly known as:  ZyrTEC Take 1 Tab by mouth daily. cyclobenzaprine 10 mg tablet Commonly known as:  FLEXERIL  
TAKE 1 TABLET BY MOUTH EACH NIGHT AT BEDTIME  
  
 gabapentin 800 mg tablet Commonly known as:  NEURONTIN  
TAKE 1 TABLET BY MOUTH THREE TIMES DAILY  
  
 nortriptyline 25 mg capsule Commonly known as:  PAMELOR  
TAKE 1-2 CAPSULES BY MOUTH AT BEDTIME  
  
 SUMAtriptan 100 mg tablet Commonly known as:  IMITREX  
TAKE 1 TABLET BY MOUTH ONCE AS NEEDED FOR MIGRAINE FOR UP TO 9 DOSES  
  
 topiramate 100 mg tablet Commonly known as:  TOPAMAX TAKE 1 TAB BY MOUTH TWO (2) TIMES A DAY. Prescriptions Sent to Pharmacy Refills  
 cefdinir (OMNICEF) 300 mg capsule 0 Sig: Take 1 Cap by mouth two (2) times a day for 10 days. Class: Normal  
 Pharmacy: [facility]/pharmacy [address], [address] [place] [address] #: [phone] Route: Oral  
  
We Performed the Following REFERRAL TO ENT-OTOLARYNGOLOGY [BCR62 Custom] Comments:  
 Right ear pain, tinnitus. Referral Information Referral ID Referred By Referred To  
  
 [id number] Doug [facility] [address] [place] [address] [phone] [place], [address] [facility] Visits Status Start Date End Date 1 New Request 2/12/18 2/12/19 If your referral has a status of pending review or denied, additional information will be sent to support the outcome of this decision. Introducing \[facility]\"" & HEALTH SERVICES! Alpa Hansen introduces LaserLeap patient portal. Now you can access parts of your medical record, email your doctor's office, and request medication refills online. 1. In your internet browser, go to [email or web address]/[email or web address] 2. Click on the First Time User? Click Here link in the Sign In box. You will see the New Member Sign Up page. 3. Enter your LaserLeap Access Code exactly as it appears below. You will not need to use this code after youve completed the sign-up process. If you do not sign up before the expiration date, you must request a new code. · LaserLeap Access Code: [email or web address] Expires: 5/1/2018  2:37 PM 
 
4. Enter the last four digits of your Social Security Number (xxxx) and Date of Birth (mm/dd/yyyy) as indicated and click Submit. You will be taken to the next sign-up page. 5. Create a LaserLeap ID. This will be your LaserLeap login ID and cannot be changed, so think of one that is secure and easy to remember. 6. Create a LaserLeap password. You can change your password at any time. 7. Enter your Password Reset Question and Answer. This can be used at a later time if you forget your password. 8. Enter your e-mail address. You will receive e-mail notification when new information is available in [address] 9. Click Sign Up. You can now view and download portions of your medical record. 10. Click the Download Summary menu link to download a portable copy of your medical information. If you have questions, please visit the Frequently Asked Questions section of the LaserLeap website. Remember, LaserLeap is NOT to be used for urgent needs. For medical emergencies, dial 911. Now available from your iPhone and Android! Please provide this summary of care documentation to your next provider. Your primary care clinician is listed as Robert Cruz. If you have any questions after today's visit, please call [phone].

## 2018-02-12 NOTE — TELEPHONE ENCOUNTER
Message forwarded from call center    The patient is requesting a call back to discuss ear pain that she is still having. [phone])[phone]

## 2018-02-12 NOTE — PROGRESS NOTES
Identified pt with two pt identifiers(name and ). Chief Complaint   Patient presents with    Ear Pain     she has seen by [facility] on 18. It is worse now    Sinus Infection        There are no preventive care reminders to display for this patient. Wt Readings from Last 3 Encounters:   18 152 lb 9.6 oz (69.2 kg)   18 149 lb (67.6 kg)   17 151 lb (68.5 kg)     Temp Readings from Last 3 Encounters:   18 97.9 °F (36.6 °C) (Oral)   18 98.4 °F (36.9 °C) (Oral)   17 98.1 °F (36.7 °C) (Oral)     BP Readings from Last 3 Encounters:   18 90/58   18 116/80   17 118/72     Pulse Readings from Last 3 Encounters:   18 85   18 100   17 86         Learning Assessment:  :     Learning Assessment 2014   PRIMARY LEARNER Patient Patient   HIGHEST LEVEL OF EDUCATION - PRIMARY LEARNER  DID NOT GRADUATE HIGH SCHOOL DID NOT GRADUATE HIGH SCHOOL   BARRIERS PRIMARY LEARNER - NONE   CO-LEARNER CAREGIVER - No   PRIMARY LANGUAGE ENGLISH ENGLISH   LEARNER PREFERENCE PRIMARY DEMONSTRATION DEMONSTRATION   ANSWERED BY patient patient    RELATIONSHIP SELF SELF       Depression Screening:  :     PHQ over the last two weeks 3/6/2017   PHQ Not Done Active Diagnosis of Depression or Bipolar Disorder   Little interest or pleasure in doing things -   Feeling down, depressed or hopeless -   Total Score PHQ 2 -       Fall Risk Assessment:  :     Fall Risk Assessment, last 12 mths 3/6/2017   Able to walk? Yes   Fall in past 12 months? No       Abuse Screening:  :     Abuse Screening Questionnaire 3/6/2017 1/15/2016 2014   Do you ever feel afraid of your partner? N N N   Are you in a relationship with someone who physically or mentally threatens you? N N N   Is it safe for you to go home?  [facility]       Coordination of Care Questionnaire:  :     1) Have you been to an emergency room, urgent care clinic since your last visit? no   Hospitalized since your last visit? no             2) Have you seen or consulted any other health care providers outside of [address] [place] [address] since your last visit? no  (Include any pap smears or colon screenings in this section.)    3) Do you have an Advance Directive on file? no  Are you interested in receiving information about Advance Directives? no    Patient is accompanied by daughter I have received verbal consent from Jorge Bryant to discuss any/all medical information while they are present in the room. Reviewed record in preparation for visit and have obtained necessary documentation. Medication reconciliation up to date and corrected with patient at this time.

## 2018-03-02 RX ORDER — GABAPENTIN 800 MG/1
TABLET ORAL
Qty: 90 TAB | Refills: 4 | Status: SHIPPED | OUTPATIENT
Start: 2018-03-02 | End: 2018-10-29 | Stop reason: SDUPTHER

## 2018-03-30 RX ORDER — NORTRIPTYLINE HYDROCHLORIDE 25 MG/1
CAPSULE ORAL
Qty: 60 CAP | Refills: 5 | Status: SHIPPED | OUTPATIENT
Start: 2018-03-30 | End: 2018-09-28 | Stop reason: SDUPTHER

## 2018-04-20 DIAGNOSIS — R51.9 HEADACHE, UNSPECIFIED HEADACHE TYPE: ICD-10-CM

## 2018-04-20 RX ORDER — BUTALBITAL, ACETAMINOPHEN AND CAFFEINE 50; 325; 40 MG/1; MG/1; MG/1
TABLET ORAL
Qty: 30 TAB | Refills: 0 | Status: SHIPPED | OUTPATIENT
Start: 2018-04-20 | End: 2018-05-09 | Stop reason: SDUPTHER

## 2018-05-09 ENCOUNTER — OFFICE VISIT (OUTPATIENT)
Dept: FAMILY MEDICINE CLINIC | Age: 48
End: 2018-05-09

## 2018-05-09 VITALS
BODY MASS INDEX: 25.91 KG/M2 | OXYGEN SATURATION: 99 % | HEIGHT: 63 IN | WEIGHT: 146.2 LBS | DIASTOLIC BLOOD PRESSURE: 60 MMHG | SYSTOLIC BLOOD PRESSURE: 92 MMHG | RESPIRATION RATE: 20 BRPM | HEART RATE: 84 BPM | TEMPERATURE: 98 F

## 2018-05-09 DIAGNOSIS — M62.830 SPASM OF MUSCLE, BACK: ICD-10-CM

## 2018-05-09 DIAGNOSIS — F41.9 ANXIETY: ICD-10-CM

## 2018-05-09 DIAGNOSIS — Z79.899 ENCOUNTER FOR LONG-TERM CURRENT USE OF MEDICATION: ICD-10-CM

## 2018-05-09 DIAGNOSIS — Z13.1 SCREENING FOR DIABETES MELLITUS: ICD-10-CM

## 2018-05-09 DIAGNOSIS — Z13.220 SCREENING, LIPID: ICD-10-CM

## 2018-05-09 DIAGNOSIS — E55.9 VITAMIN D DEFICIENCY: ICD-10-CM

## 2018-05-09 DIAGNOSIS — N64.4 BREAST PAIN, LEFT: ICD-10-CM

## 2018-05-09 DIAGNOSIS — R51.9 HEADACHE, UNSPECIFIED HEADACHE TYPE: Primary | ICD-10-CM

## 2018-05-09 DIAGNOSIS — F33.1 MODERATE EPISODE OF RECURRENT MAJOR DEPRESSIVE DISORDER (HCC): ICD-10-CM

## 2018-05-09 PROBLEM — M25.562 ACUTE PAIN OF LEFT KNEE: Status: RESOLVED | Noted: 2017-10-03 | Resolved: 2018-05-09

## 2018-05-09 PROBLEM — J40 BRONCHITIS: Status: RESOLVED | Noted: 2017-07-14 | Resolved: 2018-05-09

## 2018-05-09 PROBLEM — H65.111 ACUTE MUCOID OTITIS MEDIA OF RIGHT EAR: Status: RESOLVED | Noted: 2017-06-05 | Resolved: 2018-05-09

## 2018-05-09 PROBLEM — H92.01 RIGHT EAR PAIN: Status: RESOLVED | Noted: 2017-07-05 | Resolved: 2018-05-09

## 2018-05-09 RX ORDER — FLUTICASONE PROPIONATE 50 MCG
2 SPRAY, SUSPENSION (ML) NASAL DAILY
COMMUNITY
End: 2019-10-22

## 2018-05-09 RX ORDER — BUTALBITAL, ACETAMINOPHEN AND CAFFEINE 50; 325; 40 MG/1; MG/1; MG/1
TABLET ORAL
Qty: 30 TAB | Refills: 3 | Status: SHIPPED | OUTPATIENT
Start: 2018-05-09 | End: 2018-08-23 | Stop reason: SDUPTHER

## 2018-05-09 RX ORDER — CYCLOBENZAPRINE HCL 10 MG
TABLET ORAL
Qty: 30 TAB | Refills: 5 | Status: SHIPPED | OUTPATIENT
Start: 2018-05-09 | End: 2019-03-07 | Stop reason: SDUPTHER

## 2018-05-09 RX ORDER — TOPIRAMATE 100 MG/1
TABLET, FILM COATED ORAL
Qty: 60 TAB | Refills: 3 | Status: SHIPPED | OUTPATIENT
Start: 2018-05-09 | End: 2018-10-28 | Stop reason: SDUPTHER

## 2018-05-09 RX ORDER — SERTRALINE HYDROCHLORIDE 100 MG/1
100 TABLET, FILM COATED ORAL DAILY
Qty: 30 TAB | Refills: 3
Start: 2018-05-09 | End: 2018-12-12 | Stop reason: SDUPTHER

## 2018-05-09 NOTE — PATIENT INSTRUCTIONS
Back Spasm: Care Instructions  Your Care Instructions  A back spasm is sudden tightness and pain in your back muscles. It may happen from overuse or an injury. Things like sleeping in an awkward way, bending, lifting, standing, or sitting can sometimes cause a spasm. But the cause isn't always clear. Home treatment includes using heat or ice, taking over-the-counter (OTC) pain medicines, and avoiding activities that may cause back pain. For a back spasm that doesn't get better with home care, your doctor may prescribe medicine. Treatments such as massage or manipulation may also help ease a back spasm. Your doctor may also suggest exercise or physical therapy to help improve strength and flexibility in your back muscles. In most cases, getting back to your normal activities is good foryour back. Just make sure to avoid doing things that make your pain worse. Follow-up care is a key part of your treatment and safety. Be sure to make and go to all appointments, and call your doctor if you are having problems. It's also a good idea to know your test results and keep a list of the medicines you take. How can you care for yourself at home? ? Heat, ice, and medicines  ? · To relieve pain, use heat or ice (whichever feels better) on the affected area. ¨ Put a warm water bottle, a heating pad set on low, or a warm cloth on your back. Put a thin cloth between the heating pad and your skin. Do not go to sleep with a heating pad on your skin. ¨ Try ice or a cold pack on the area for 10 to 20 minutes at a time. Put a thin cloth between the ice and your skin. ? · Ask your doctor if you can take acetaminophen (such as Tylenol) or nonsteroidal anti-inflammatory drugs, such as ibuprofen or naproxen. Your doctor can prescribe stronger medicines if needed. Be safe with medicines. Read and follow all instructions on the label. ?Body positions and posture  ?  · Sit or lie in positions that are most comfortable for you and that reduce pain. Try one of these positions when you lie down:  ¨ Lie on your back with your knees bent and supported by large pillows. ¨ Lie on the floor with your legs on the seat of a sofa or chair. Suzanne Blacklake on your side with your knees and hips bent and a pillow between your legs. ¨ Lie on your stomach if it does not make pain worse. ? · Do not sit up in bed. Avoid soft couches and twisted positions. ? · Avoid bed rest after the first day of back pain. Bed rest can help relieve pain at first, but it delays healing. Continued rest without activity is usually not good for your back. ? · If you must sit for long periods of time, take breaks from sitting. Change positions every 30 minutes. Get up and walk around, or lie in a comfortable position. Activity  ? · Take short walks several times a day. You can start with 5 to 10 minutes, 3 or 4 times a day, and work up to longer walks. Walk on level surfaces and avoid hills and stairs until your back starts to feel better. ? · After your back spasm starts to feel better, try to stretch your muscles every day, especially before and after exercise and at bedtime. Regular stretching can help relax your muscles. ? · To prevent future back pain, do exercises to stretch and strengthen your back and stomach. Learn to use good posture, safe lifting techniques, and other ways to move to help you avoid back pain. When should you call for help? Call 911 anytime you think you may need emergency care. For example, call if:  ? · You are unable to move an arm or a leg at all. ?Call your doctor now or seek immediate medical care if:  ? · You have new or worse symptoms in your legs, belly, or buttocks. Symptoms may include:  ¨ Numbness or tingling. ¨ Weakness. ¨ Pain. ? · You lose bladder or bowel control. ? Watch closely for changes in your health, and be sure to contact your doctor if:  ? · You do not get better as expected. Where can you learn more?   Go to http://smooth-connie.info/. Enter E232 in the search box to learn more about \"Back Spasm: Care Instructions. \"  Current as of: March 21, 2017  Content Version: 11.4  © 8814-1589 Healthwise, [employer]. Care instructions adapted under license by [employer] (which disclaims liability or warranty for this information). If you have questions about a medical condition or this instruction, always ask your healthcare professional. [place] [address] any warranty or liability for your use of this information.

## 2018-05-09 NOTE — MR AVS SNAPSHOT
[address] [facility] 
[phone] Patient: Roland Guadalupe MRN: [id number] LUX:[id number] Visit Information Date & Time Provider Department Dept. Phone Encounter #  
 3/6/3059  2:76 AM Winifred Watson Patrick Jules [phone] [id number] Upcoming Health Maintenance Date Due Influenza Age 5 to Adult 8/1/2018 DTaP/Tdap/Td series (2 - Td) 8/31/2025 Allergies as of 5/9/2018  Review Complete On: 2/7/0660 By: Winifred Watson MD  
  
 Severity Noted Reaction Type Reactions Latex  11/20/2017    Rash Erythromycin  01/21/2015    Nausea and Vomiting Vancomycin  12/02/2013    Swelling Current Immunizations  Reviewed on 10/3/2017 Name Date Influenza Vaccine (Quad) PF 10/3/2017 11:00 AM, 11/2/2016 Tdap 8/31/2015 12:12 PM  
  
 Not reviewed this visit You Were Diagnosed With   
  
 Codes Comments Headache, unspecified headache type    -  Primary ICD-10-CM: R51 ICD-9-CM: 784.0 Moderate episode of recurrent major depressive disorder (HCC)     ICD-10-CM: F33.1 ICD-9-CM: 296.32 Anxiety     ICD-10-CM: F41.9 ICD-9-CM: 300.00 Spasm of muscle, back     ICD-10-CM: Q88.513 ICD-9-CM: 724.8 Encounter for long-term current use of medication     ICD-10-CM: Z79.899 ICD-9-CM: V58.69 Vitamin D deficiency     ICD-10-CM: E55.9 ICD-9-CM: 268.9 Screening, lipid     ICD-10-CM: T71.478 ICD-9-CM: V77.91 Screening for diabetes mellitus     ICD-10-CM: Z13.1 ICD-9-CM: V77.1 Breast pain, left     ICD-10-CM: N64.4 ICD-9-CM: 611.71 Vitals BP Pulse Temp Resp Height(growth percentile) Weight(growth percentile) 92/60 (BP 1 Location: Left arm, BP Patient Position: Sitting) 84 98 °F (36.7 °C) (Oral) 20 5' 3\" (1.6 m) 146 lb 3.2 oz (66.3 kg) LMP SpO2 BMI OB Status Smoking Status 01/09/2010 99% 25.9 kg/m2 Hysterectomy Never Smoker Vitals History BMI and BSA Data Body Mass Index Body Surface Area  
 25.9 kg/m 2 1.72 m 2 Preferred Pharmacy Pharmacy Name Phone [facility]/PHARMACY #[id number] [phone] Your Updated Medication List  
  
   
This list is accurate as of 5/9/18  9:22 AM.  Always use your most recent med list.  
  
  
  
  
 butalbital-acetaminophen-caffeine -40 mg per tablet Commonly known as:  FIORICET, ESGIC  
TAKE ONE TABLET BY MOUTH EVERY 6 HOURS AS NEEDED FOR HEADACHE. MAX DAILY AMOUNT 4TABS  
  
 cetirizine 10 mg tablet Commonly known as:  ZyrTEC Take 1 Tab by mouth daily. cyclobenzaprine 10 mg tablet Commonly known as:  FLEXERIL  
TAKE 1 TABLET BY MOUTH EACH NIGHT AT BEDTIME  
  
 FLONASE ALLERGY RELIEF 50 mcg/actuation nasal spray Generic drug:  fluticasone 2 Sprays by Both Nostrils route daily. gabapentin 800 mg tablet Commonly known as:  NEURONTIN  
TAKE 1 TABLET BY MOUTH THREE TIMES DAILY  
  
 nortriptyline 25 mg capsule Commonly known as:  PAMELOR  
TAKE 1-2 CAPSULES BY MOUTH AT BEDTIME  
  
 sertraline 100 mg tablet Commonly known as:  ZOLOFT Take 1 Tab by mouth daily. Indications: ANXIETY WITH DEPRESSION  
  
 SUMAtriptan 100 mg tablet Commonly known as:  IMITREX  
TAKE 1 TABLET BY MOUTH ONCE AS NEEDED FOR MIGRAINE FOR UP TO 9 DOSES  
  
 topiramate 100 mg tablet Commonly known as:  TOPAMAX TAKE 1 TAB BY MOUTH TWO (2) TIMES A DAY. Prescriptions Sent to Pharmacy Refills  
 topiramate (TOPAMAX) 100 mg tablet 3 Sig: TAKE 1 TAB BY MOUTH TWO (2) TIMES A DAY. Class: Normal  
 Pharmacy: [address] [place] [address] #: [phone]  
 butalbital-acetaminophen-caffeine (FIORICET, ESGIC) -40 mg per tablet 3 Sig: TAKE ONE TABLET BY MOUTH EVERY 6 HOURS AS NEEDED FOR HEADACHE. MAX DAILY AMOUNT 4TABS  Class: Normal  
 Pharmacy: [facility]/pharmacy [address], [address] [place] [address] Ph #: [phone]  
 cyclobenzaprine (FLEXERIL) 10 mg tablet 5 Sig: TAKE 1 TABLET BY MOUTH EACH NIGHT AT BEDTIME Class: Normal  
 Pharmacy: [facility]/pharmacy [address], [address] Ph #: [phone] We Performed the Following CBC W/O DIFF [62129 CPT(R)] HEMOGLOBIN A1C WITH EAG [52920 CPT(R)] LIPID PANEL [57842 CPT(R)] METABOLIC PANEL, COMPREHENSIVE [73315 CPT(R)] TSH 3RD GENERATION [10851 CPT(R)] VITAMIN D, 25 HYDROXY [id number] CPT(R)] To-Do List   
 05/09/2018 Imaging:  [facility] MAMMO BI DX INCL CAD   
  
 05/09/2018 Imaging:  US BREAST LT COMPLETE 4 QUAD Patient Instructions Back Spasm: Care Instructions Your Care Instructions A back spasm is sudden tightness and pain in your back muscles. It may happen from overuse or an injury. Things like sleeping in an awkward way, bending, lifting, standing, or sitting can sometimes cause a spasm. But the cause isn't always clear. Home treatment includes using heat or ice, taking over-the-counter (OTC) pain medicines, and avoiding activities that may cause back pain. For a back spasm that doesn't get better with home care, your doctor may prescribe medicine. Treatments such as massage or manipulation may also help ease a back spasm. Your doctor may also suggest exercise or physical therapy to help improve strength and flexibility in your back muscles. In most cases, getting back to your normal activities is good foryour back. Just make sure to avoid doing things that make your pain worse. Follow-up care is a key part of your treatment and safety. Be sure to make and go to all appointments, and call your doctor if you are having problems. It's also a good idea to know your test results and keep a list of the medicines you take. How can you care for yourself at home? ? Heat, ice, and medicines ? · To relieve pain, use heat or ice (whichever feels better) on the affected area. ¨ Put a warm water bottle, a heating pad set on low, or a warm cloth on your back. Put a thin cloth between the heating pad and your skin. Do not go to sleep with a heating pad on your skin. ¨ Try ice or a cold pack on the area for 10 to 20 minutes at a time. Put a thin cloth between the ice and your skin. ? · Ask your doctor if you can take acetaminophen (such as Tylenol) or nonsteroidal anti-inflammatory drugs, such as ibuprofen or naproxen. Your doctor can prescribe stronger medicines if needed. Be safe with medicines. Read and follow all instructions on the label. ?Body positions and posture ? · Sit or lie in positions that are most comfortable for you and that reduce pain. Try one of these positions when you lie down: ¨ Lie on your back with your knees bent and supported by large pillows. ¨ Lie on the floor with your legs on the seat of a sofa or chair. Susanne Cuco on your side with your knees and hips bent and a pillow between your legs. ¨ Lie on your stomach if it does not make pain worse. ? · Do not sit up in bed. Avoid soft couches and twisted positions. ? · Avoid bed rest after the first day of back pain. Bed rest can help relieve pain at first, but it delays healing. Continued rest without activity is usually not good for your back. ? · If you must sit for long periods of time, take breaks from sitting. Change positions every 30 minutes. Get up and walk around, or lie in a comfortable position. Activity ? · Take short walks several times a day. You can start with 5 to 10 minutes, 3 or 4 times a day, and work up to longer walks. Walk on level surfaces and avoid hills and stairs until your back starts to feel better. ? · After your back spasm starts to feel better, try to stretch your muscles every day, especially before and after exercise and at bedtime. Regular stretching can help relax your muscles. ? · To prevent future back pain, do exercises to stretch and strengthen your back and stomach. Learn to use good posture, safe lifting techniques, and other ways to move to help you avoid back pain. When should you call for help? Call 911 anytime you think you may need emergency care. For example, call if: 
? · You are unable to move an arm or a leg at all. ?Call your doctor now or seek immediate medical care if: 
? · You have new or worse symptoms in your legs, belly, or buttocks. Symptoms may include: ¨ Numbness or tingling. ¨ Weakness. ¨ Pain. ? · You lose bladder or bowel control. ? Watch closely for changes in your health, and be sure to contact your doctor if: 
? · You do not get better as expected. Where can you learn more? Go to http://smooth-connie.info/. Enter E232 in the search box to learn more about \"Back Spasm: Care Instructions. \" Current as of: March 21, 2017 Content Version: 11.4 © 6329-2742 "[employer]. Care instructions adapted under license by [employer] (which disclaims liability or warranty for this information). If you have questions about a medical condition or this instruction, always ask your healthcare professional. [place] [address] any warranty or liability for your use of this information. Introducing [facility]! Dakotah March introduces Bloson patient portal. Now you can access parts of your medical record, email your doctor's office, and request medication refills online. 1. In your internet browser, go to [email or web address] 2. Click on the First Time User? Click Here link in the Sign In box. You will see the New Member Sign Up page. 3. Enter your Bloson Access Code exactly as it appears below. You will not need to use this code after youve completed the sign-up process. If you do not sign up before the expiration date, you must request a new code. · Boqii Access Code: [email or web address] Expires: 8/7/2018  9:22 AM 
 
4. Enter the last four digits of your Social Security Number (xxxx) and Date of Birth (mm/dd/yyyy) as indicated and click Submit. You will be taken to the next sign-up page. 5. Create a Boqii ID. This will be your Boqii login ID and cannot be changed, so think of one that is secure and easy to remember. 6. Create a Boqii password. You can change your password at any time. 7. Enter your Password Reset Question and Answer. This can be used at a later time if you forget your password. 8. Enter your e-mail address. You will receive e-mail notification when new information is available in [address] 9. Click Sign Up. You can now view and download portions of your medical record. 10. Click the Download Summary menu link to download a portable copy of your medical information. If you have questions, please visit the Frequently Asked Questions section of the Boqii website. Remember, Boqii is NOT to be used for urgent needs. For medical emergencies, dial 911. Now available from your iPhone and Android! Please provide this summary of care documentation to your next provider. Your primary care clinician is listed as Cesar Moreno. If you have any questions after today's visit, please call [phone].

## 2018-05-09 NOTE — PROGRESS NOTES
HISTORY OF PRESENT ILLNESS  Virgilio Sexton is a 57 y.o. female. HPI  FU chronic problems:  Headaches, chronic back and neck pain, stress, tinnitus. She needs med refills. Chronic tinnitus ongoing. Has been to ENT. ? Worse with allergies. Due for mammogram.  Ongoing left lateral breast pain. She weaned off Wellbutrin XL but started taking Sertraline 50 mg which she had supply at home. Tolerating med fine but feels need to adjust dose up. Daughter getting  and another one is graduating from [employer]. Review of Systems   Musculoskeletal: Positive for back pain and neck pain. Neurological: Positive for headaches. All other systems reviewed and are negative. Patient Active Problem List   Diagnosis Code    Migraine with aura G43. 109    Chronic neck pain M54.2, G89.29    Tinnitus of both ears H93.13    Fatigue R53.83    Muscle spasms of neck M62.838    Spasm of muscle, back M62.830    Chronic back pain M54.9, G89.29    Moderate episode of recurrent major depressive disorder (HCC) F33.1    Anxiety F41.9     Current Outpatient Prescriptions on File Prior to Visit   Medication Sig Dispense Refill    nortriptyline (PAMELOR) 25 mg capsule TAKE 1-2 CAPSULES BY MOUTH AT BEDTIME 60 Cap 5    gabapentin (NEURONTIN) 800 mg tablet TAKE 1 TABLET BY MOUTH THREE TIMES DAILY 90 Tab 4    cetirizine (ZYRTEC) 10 mg tablet Take 1 Tab by mouth daily. 30 Tab 3    SUMAtriptan (IMITREX) 100 mg tablet TAKE 1 TABLET BY MOUTH ONCE AS NEEDED FOR MIGRAINE FOR UP TO 9 DOSES 9 Tab 5     No current facility-administered medications on file prior to visit. Visit Vitals    BP 92/60 (BP 1 Location: Left arm, BP Patient Position: Sitting)  Comment: manual    Pulse 84    Temp 98 °F (36.7 °C) (Oral)    Resp 20    Ht 5' 3\" (1.6 m)    Wt 146 lb 3.2 oz (66.3 kg)    LMP 01/09/2010    SpO2 99%    BMI 25.9 kg/m2       Physical Exam   Constitutional: She appears well-developed and well-nourished.    HENT:   Right Ear: External ear normal.   Left Ear: External ear normal.   Mouth/Throat: Oropharynx is clear and moist.   Neck: Neck supple. Cardiovascular: Normal rate, regular rhythm and normal heart sounds. Pulmonary/Chest: Effort normal and breath sounds normal. Left breast exhibits tenderness. Left breast exhibits no mass, no nipple discharge and no skin change. Vitals reviewed. ASSESSMENT and PLAN    ICD-10-CM ICD-9-CM    1. Headache, unspecified headache type R51 784.0 topiramate (TOPAMAX) 100 mg tablet      butalbital-acetaminophen-caffeine (FIORICET, ESGIC) -40 mg per tablet   2. Moderate episode of recurrent major depressive disorder (HCC) F33.1 296.32    3. Anxiety F41.9 300.00 TSH 3RD GENERATION      sertraline (ZOLOFT) 100 mg tablet   4. Spasm of muscle, back M62.830 724.8 cyclobenzaprine (FLEXERIL) 10 mg tablet   5. Encounter for long-term current use of medication Z79.899 V58.69 CBC W/O DIFF      METABOLIC PANEL, COMPREHENSIVE   6. Vitamin D deficiency E55.9 268.9 VITAMIN D, 25 HYDROXY   7. Screening, lipid Z13.220 V77.91 LIPID PANEL   8. Screening for diabetes mellitus Z13.1 V77.1 HEMOGLOBIN A1C WITH EAG   9. Breast pain, left N64.4 611.71 [facility] MAMMO BI DX INCL CAD      US BREAST LT COMPLETE 4 QUAD     Labs drawn. Increase Sertraline 100 mg daily. Order DX mammogram and L breast US.

## 2018-05-09 NOTE — PROGRESS NOTES
Identified pt with two pt identifiers(name and ). Chief Complaint   Patient presents with    Headache     she said she is having more headaches    Neck Pain     her neck is still hurting    Medication Evaluation     she wants to discuss zoloft and wellbutrin     Stress     one daughter is getting  and another is graduating        Health Maintenance Due   Topic    PAP AKA CERVICAL CYTOLOGY        Wt Readings from Last 3 Encounters:   18 146 lb 3.2 oz (66.3 kg)   18 152 lb 9.6 oz (69.2 kg)   18 149 lb (67.6 kg)     Temp Readings from Last 3 Encounters:   18 98 °F (36.7 °C) (Oral)   18 97.9 °F (36.6 °C) (Oral)   18 98.4 °F (36.9 °C) (Oral)     BP Readings from Last 3 Encounters:   18 92/60   18 90/58   18 116/80     Pulse Readings from Last 3 Encounters:   18 84   18 85   18 100         Learning Assessment:  :     Learning Assessment 2014   PRIMARY LEARNER Patient Patient   HIGHEST LEVEL OF EDUCATION - PRIMARY LEARNER  DID NOT GRADUATE HIGH SCHOOL DID NOT GRADUATE HIGH SCHOOL   BARRIERS PRIMARY LEARNER - NONE   CO-LEARNER CAREGIVER - No   PRIMARY LANGUAGE ENGLISH ENGLISH   LEARNER PREFERENCE PRIMARY DEMONSTRATION DEMONSTRATION   ANSWERED BY patient patient    RELATIONSHIP SELF SELF       Depression Screening:  :     PHQ over the last two weeks 2018   PHQ Not Done Active Diagnosis of Depression or Bipolar Disorder   Little interest or pleasure in doing things -   Feeling down, depressed or hopeless -   Total Score PHQ 2 -       Fall Risk Assessment:  :     Fall Risk Assessment, last 12 mths 3/6/2017   Able to walk? Yes   Fall in past 12 months? No       Abuse Screening:  :     Abuse Screening Questionnaire 2018 3/6/2017 1/15/2016 2014   Do you ever feel afraid of your partner? N N N N   Are you in a relationship with someone who physically or mentally threatens you?  N N N N   Is it safe for you to go home? Y Y Y Y       Coordination of Care Questionnaire:  :     1) Have you been to an emergency room, urgent care clinic since your last visit? no   Hospitalized since your last visit? no             2) Have you seen or consulted any other health care providers outside of [address] [place] [address] since your last visit? no  (Include any pap smears or colon screenings in this section.)    3) Do you have an Advance Directive on file? no  Are you interested in receiving information about Advance Directives? Yes paper work given and explained    Reviewed record in preparation for visit and have obtained necessary documentation. Medication reconciliation up to date and corrected with patient at this time.

## 2018-05-10 LAB
25(OH)D3+25(OH)D2 SERPL-MCNC: 22.3 NG/ML (ref 30–100)
ALBUMIN SERPL-MCNC: 4.5 G/DL (ref 3.5–5.5)
ALBUMIN/GLOB SERPL: 1.6 {RATIO} (ref 1.2–2.2)
ALP SERPL-CCNC: 135 IU/L (ref 39–117)
ALT SERPL-CCNC: 19 IU/L (ref 0–32)
AST SERPL-CCNC: 24 IU/L (ref 0–40)
BILIRUB SERPL-MCNC: <0.2 MG/DL (ref 0–1.2)
BUN SERPL-MCNC: 7 MG/DL (ref 6–24)
BUN/CREAT SERPL: 7 (ref 9–23)
CALCIUM SERPL-MCNC: 9.6 MG/DL (ref 8.7–10.2)
CHLORIDE SERPL-SCNC: 101 MMOL/L (ref 96–106)
CHOLEST SERPL-MCNC: 253 MG/DL (ref 100–199)
CO2 SERPL-SCNC: 24 MMOL/L (ref 18–29)
CREAT SERPL-MCNC: 1.01 MG/DL (ref 0.57–1)
ERYTHROCYTE [DISTWIDTH] IN BLOOD BY AUTOMATED COUNT: 14.2 % (ref 12.3–15.4)
EST. AVERAGE GLUCOSE BLD GHB EST-MCNC: 105 MG/DL
GFR SERPLBLD CREATININE-BSD FMLA CKD-EPI: 67 ML/MIN/1.73
GFR SERPLBLD CREATININE-BSD FMLA CKD-EPI: 77 ML/MIN/1.73
GLOBULIN SER CALC-MCNC: 2.9 G/DL (ref 1.5–4.5)
GLUCOSE SERPL-MCNC: 86 MG/DL (ref 65–99)
HBA1C MFR BLD: 5.3 % (ref 4.8–5.6)
HCT VFR BLD AUTO: 41.3 % (ref 34–46.6)
HDLC SERPL-MCNC: 54 MG/DL
HGB BLD-MCNC: 13.8 G/DL (ref 11.1–15.9)
INTERPRETATION, 910389: NORMAL
LDLC SERPL CALC-MCNC: 159 MG/DL (ref 0–99)
MCH RBC QN AUTO: 31.5 PG (ref 26.6–33)
MCHC RBC AUTO-ENTMCNC: 33.4 G/DL (ref 31.5–35.7)
MCV RBC AUTO: 94 FL (ref 79–97)
PLATELET # BLD AUTO: 294 X10E3/UL (ref 150–379)
POTASSIUM SERPL-SCNC: 3.5 MMOL/L (ref 3.5–5.2)
PROT SERPL-MCNC: 7.4 G/DL (ref 6–8.5)
RBC # BLD AUTO: 4.38 X10E6/UL (ref 3.77–5.28)
SODIUM SERPL-SCNC: 144 MMOL/L (ref 134–144)
TRIGL SERPL-MCNC: 198 MG/DL (ref 0–149)
TSH SERPL DL<=0.005 MIU/L-ACNC: 6.32 UIU/ML (ref 0.45–4.5)
VLDLC SERPL CALC-MCNC: 40 MG/DL (ref 5–40)
WBC # BLD AUTO: 8.8 X10E3/UL (ref 3.4–10.8)

## 2018-05-13 ENCOUNTER — TELEPHONE (OUTPATIENT)
Dept: FAMILY MEDICINE CLINIC | Age: 48
End: 2018-05-13

## 2018-05-14 ENCOUNTER — TELEPHONE (OUTPATIENT)
Dept: FAMILY MEDICINE CLINIC | Age: 48
End: 2018-05-14

## 2018-05-14 ENCOUNTER — HOSPITAL ENCOUNTER (OUTPATIENT)
Dept: MAMMOGRAPHY | Age: 48
Discharge: HOME | End: 2018-05-14
Attending: FAMILY MEDICINE
Payer: SUBSIDIZED

## 2018-05-14 ENCOUNTER — APPOINTMENT (OUTPATIENT)
Dept: MAMMOGRAPHY | Age: 48
End: 2018-05-14
Attending: FAMILY MEDICINE
Payer: SUBSIDIZED

## 2018-05-14 DIAGNOSIS — N64.4 BREAST PAIN, LEFT: ICD-10-CM

## 2018-05-14 DIAGNOSIS — N63.20 LUMP OF BREAST, LEFT: Primary | ICD-10-CM

## 2018-05-14 DIAGNOSIS — Z12.31 VISIT FOR SCREENING MAMMOGRAM: ICD-10-CM

## 2018-05-14 DIAGNOSIS — N63.20 LUMP OF BREAST, LEFT: ICD-10-CM

## 2018-05-14 PROCEDURE — 76642 ULTRASOUND BREAST LIMITED: CPT

## 2018-05-14 PROCEDURE — 77066 DX MAMMO INCL CAD BI: CPT

## 2018-05-14 NOTE — TELEPHONE ENCOUNTER
She will make a appt. To discuss labs. She was not surprised about the thyroid. Her family has thyroid problems.

## 2018-05-14 NOTE — TELEPHONE ENCOUNTER
I wish to see her to discuss labs: Abn TSH indicate under active thyroid and high cholesterol. Also low Vit D. Discuss treatment.

## 2018-05-18 ENCOUNTER — OFFICE VISIT (OUTPATIENT)
Dept: FAMILY MEDICINE CLINIC | Age: 48
End: 2018-05-18

## 2018-05-18 VITALS
OXYGEN SATURATION: 98 % | BODY MASS INDEX: 25.69 KG/M2 | HEIGHT: 63 IN | HEART RATE: 90 BPM | WEIGHT: 145 LBS | SYSTOLIC BLOOD PRESSURE: 100 MMHG | DIASTOLIC BLOOD PRESSURE: 68 MMHG | RESPIRATION RATE: 20 BRPM | TEMPERATURE: 97.9 F

## 2018-05-18 DIAGNOSIS — R79.89 ABNORMAL THYROID BLOOD TEST: ICD-10-CM

## 2018-05-18 DIAGNOSIS — E78.00 HYPERCHOLESTEREMIA: Primary | ICD-10-CM

## 2018-05-18 DIAGNOSIS — E55.9 VITAMIN D DEFICIENCY: ICD-10-CM

## 2018-05-18 RX ORDER — GLUCOSAMINE SULFATE 1500 MG
1000 POWDER IN PACKET (EA) ORAL DAILY
Qty: 30 CAP | Refills: 12 | COMMUNITY
Start: 2018-05-18 | End: 2018-10-19

## 2018-05-18 NOTE — MR AVS SNAPSHOT
[facility] [address] [facility] 
[phone] Patient: Jonathan Womack MRN: [id number] FYE:[birth year] Visit Information Date & Time Provider Department Dept. Phone Encounter #  
 8/07/1716  5:79 PM Olivia Favre, [place] [phone] [id number] Upcoming Health Maintenance Date Due Influenza Age 5 to Adult 8/1/2018 DTaP/Tdap/Td series (2 - Td) 8/31/2025 Allergies as of 5/18/2018  Review Complete On: 1/74/7584 By: Olivia Favre, MD  
  
 Severity Noted Reaction Type Reactions Latex  11/20/2017    Rash Erythromycin  01/21/2015    Nausea and Vomiting Vancomycin  12/02/2013    Swelling Current Immunizations  Reviewed on 10/3/2017 Name Date Influenza Vaccine (Quad) PF 10/3/2017 11:00 AM, 11/2/2016 Tdap 8/31/2015 12:12 PM  
  
 Not reviewed this visit You Were Diagnosed With   
  
 Codes Comments Hypercholesteremia    -  Primary ICD-10-CM: E78.00 ICD-9-CM: 272.0 Abnormal thyroid blood test     ICD-10-CM: R94.6 ICD-9-CM: 794.5 Vitamin D deficiency     ICD-10-CM: E55.9 ICD-9-CM: 268.9 Vitals BP Pulse Temp Resp Height(growth percentile) Weight(growth percentile) 100/68 (BP 1 Location: Right arm, BP Patient Position: Sitting) 90 97.9 °F (36.6 °C) (Oral) 20 5' 3\" (1.6 m) 145 lb (65.8 kg) LMP SpO2 BMI OB Status Smoking Status 01/09/2010 98% 25.69 kg/m2 Hysterectomy Never Smoker BMI and BSA Data Body Mass Index Body Surface Area  
 25.69 kg/m 2 1.71 m 2 Preferred Pharmacy Pharmacy Name Phone [id number] [address] [phone] Your Updated Medication List  
  
   
This list is accurate as of 5/18/18  2:39 PM.  Always use your most recent med list.  
  
  
  
  
 butalbital-acetaminophen-caffeine -40 mg per tablet Commonly known as:  Mu Fernandez  
 TAKE ONE TABLET BY MOUTH EVERY 6 HOURS AS NEEDED FOR HEADACHE. MAX DAILY AMOUNT 4TABS  
  
 cetirizine 10 mg tablet Commonly known as:  ZyrTEC Take 1 Tab by mouth daily. cyclobenzaprine 10 mg tablet Commonly known as:  FLEXERIL  
TAKE 1 TABLET BY MOUTH EACH NIGHT AT BEDTIME  
  
 FLONASE ALLERGY RELIEF 50 mcg/actuation nasal spray Generic drug:  fluticasone 2 Sprays by Both Nostrils route daily. gabapentin 800 mg tablet Commonly known as:  NEURONTIN  
TAKE 1 TABLET BY MOUTH THREE TIMES DAILY  
  
 nortriptyline 25 mg capsule Commonly known as:  PAMELOR  
TAKE 1-2 CAPSULES BY MOUTH AT BEDTIME  
  
 sertraline 100 mg tablet Commonly known as:  ZOLOFT Take 1 Tab by mouth daily. Indications: ANXIETY WITH DEPRESSION  
  
 SUMAtriptan 100 mg tablet Commonly known as:  IMITREX  
TAKE 1 TABLET BY MOUTH ONCE AS NEEDED FOR MIGRAINE FOR UP TO 9 DOSES  
  
 topiramate 100 mg tablet Commonly known as:  TOPAMAX TAKE 1 TAB BY MOUTH TWO (2) TIMES A DAY. VITAMIN D3 1,000 unit Cap Generic drug:  cholecalciferol Take 1 Cap by mouth daily. We Performed the Following T4, FREE [id number] CPT(R)] TSH 3RD GENERATION [46298 CPT(R)] Patient Instructions High Cholesterol: Care Instructions Your Care Instructions Cholesterol is a type of fat in your blood. It is needed for many body functions, such as making new cells. Cholesterol is made by your body. It also comes from food you eat. High cholesterol means that you have too much of the fat in your blood. This raises your risk of a heart attack and stroke. LDL and HDL are part of your total cholesterol. LDL is the \"bad\" cholesterol. High LDL can raise your risk for heart disease, heart attack, and stroke. HDL is the \"good\" cholesterol. It helps clear bad cholesterol from the body. High HDL is linked with a lower risk of heart disease, heart attack, and stroke. Your cholesterol levels help your doctor find out your risk for having a heart attack or stroke. You and your doctor can talk about whether you need to lower your risk and what treatment is best for you. A heart-healthy lifestyle along with medicines can help lower your cholesterol and your risk. The way you choose to lower your risk will depend on how high your risk is for heart attack and stroke. It will also depend on how you feel about taking medicines. Follow-up care is a key part of your treatment and safety. Be sure to make and go to all appointments, and call your doctor if you are having problems. It's also a good idea to know your test results and keep a list of the medicines you take. How can you care for yourself at home? · Eat a variety of foods every day. Good choices include fruits, vegetables, whole grains (like oatmeal), dried beans and peas, nuts and seeds, soy products (like tofu), and fat-free or low-fat dairy products. · Replace butter, margarine, and hydrogenated or partially hydrogenated oils with olive and canola oils. (Canola oil margarine without trans fat is fine.) · Replace red meat with fish, poultry, and soy protein (like tofu). · Limit processed and packaged foods like chips, crackers, and cookies. · Bake, broil, or steam foods. Don't diaz them. · Be physically active. Get at least 30 minutes of exercise on most days of the week. Walking is a good choice. You also may want to do other activities, such as running, swimming, cycling, or playing tennis or team sports. · Stay at a healthy weight or lose weight by making the changes in eating and physical activity listed above. Losing just a small amount of weight, even 5 to 10 pounds, can reduce your risk for having a heart attack or stroke. · Do not smoke. When should you call for help? Watch closely for changes in your health, and be sure to contact your doctor if: 
? · You need help making lifestyle changes. ? · You have questions about your medicine. Where can you learn more? Go to http://smooth-connie.info/. Enter T983 in the search box to learn more about \"High Cholesterol: Care Instructions. \" Current as of: September 21, 2016 Content Version: 11.4 © 9941-0178 [employer]. Care instructions adapted under license by [employer] (which disclaims liability or warranty for this information). If you have questions about a medical condition or this instruction, always ask your healthcare professional. [address] any warranty or liability for your use of this information. Hypothyroidism: Care Instructions Your Care Instructions You have hypothyroidism, which means that your body is not making enough thyroid hormone. This hormone helps your body use energy. If your thyroid level is low, you may feel tired, be constipated, have an increase in your blood pressure, or have dry skin or memory problems. You may also get cold easily, even when it is warm. Women with low thyroid levels may have heavy menstrual periods. A blood test to find your thyroid-stimulating hormone (TSH) level is used to check for hypothyroidism. A high TSH level may mean that you have low thyroid. When your body is not making enough thyroid hormone, TSH levels rise in an effort to make the body produce more. The treatment for hypothyroidism is to take thyroid hormone pills. You should start to feel better in 1 to 2 weeks. But it can take several months to see changes in the TSH level. You will need regular visits with your doctor to make sure you have the right dose of medicine. Most people need treatment for the rest of their lives. You will need to see your doctor regularly to have blood tests and to make sure you are doing well. Follow-up care is a key part of your treatment and safety.  Be sure to make and go to all appointments, and call your doctor if you are having problems. It's also a good idea to know your test results and keep a list of the medicines you take. How can you care for yourself at home? · Take your thyroid hormone medicine exactly as prescribed. Call your doctor if you think you are having a problem with your medicine. Most people do not have side effects if they take the right amount of medicine regularly. ¨ Take the medicine 30 minutes before breakfast, and do not take it with calcium, vitamins, or iron. ¨ Do not take extra doses of your thyroid medicine. It will not help you get better any faster, and it may cause side effects. ¨ If you forget to take a dose, do NOT take a double dose of medicine. Take your usual dose the next day. · Tell your doctor about all prescription, herbal, or over-the-counter products you take. · Take care of yourself. Eat a healthy diet, get enough sleep, and get regular exercise. When should you call for help? Call 911 anytime you think you may need emergency care. For example, call if: 
? · You passed out (lost consciousness). ? · You have severe trouble breathing. ? · You have a very slow heartbeat (less than 60 beats a minute). ? · You have a low body temperature (95°F or below). ?Call your doctor now or seek immediate medical care if: 
? · You feel tired, sluggish, or weak. ? · You have trouble remembering things or concentrating. ? · You do not begin to feel better 2 weeks after starting your medicine. ? Watch closely for changes in your health, and be sure to contact your doctor if you have any problems. Where can you learn more? Go to http://smooth-connie.info/. Enter L987 in the search box to learn more about \"Hypothyroidism: Care Instructions. \" Current as of: May 12, 2017 Content Version: 11.4 © 0112-1832 [employer], Access [employer].  Care instructions adapted under license by [employer] (which disclaims liability or warranty for this information). If you have questions about a medical condition or this instruction, always ask your healthcare professional. [address] any warranty or liability for your use of this information. Introducing [facility]! [facility] introduces [employer] patient portal. Now you can access parts of your medical record, email your doctor's office, and request medication refills online. 1. In your internet browser, go to [email or web address] 2. Click on the First Time User? Click Here link in the Sign In box. You will see the New Member Sign Up page. 3. Enter your [employer] Access Code exactly as it appears below. You will not need to use this code after youve completed the sign-up process. If you do not sign up before the expiration date, you must request a new code. · [employer] Access Code: [email or web address] Expires: 8/7/2018  9:22 AM 
 
4. Enter the last four digits of your Social Security Number (xxxx) and Date of Birth (mm/dd/yyyy) as indicated and click Submit. You will be taken to the next sign-up page. 5. Create a [employer] ID. This will be your [employer] login ID and cannot be changed, so think of one that is secure and easy to remember. 6. Create a [employer] password. You can change your password at any time. 7. Enter your Password Reset Question and Answer. This can be used at a later time if you forget your password. 8. Enter your e-mail address. You will receive e-mail notification when new information is available in [address] 9. Click Sign Up. You can now view and download portions of your medical record. 10. Click the Download Summary menu link to download a portable copy of your medical information. If you have questions, please visit the Frequently Asked Questions section of the [employer] website. Remember, [employer] is NOT to be used for urgent needs. For medical emergencies, dial 911. Now available from your iPhone and Android! Please provide this summary of care documentation to your next provider. Your primary care clinician is listed as Jeanenne Kocher. If you have any questions after today's visit, please call [phone].

## 2018-05-18 NOTE — PROGRESS NOTES
Identified pt with two pt identifiers(name and ). Chief Complaint   Patient presents with    Results    Thyroid Problem        There are no preventive care reminders to display for this patient. Wt Readings from Last 3 Encounters:   18 145 lb (65.8 kg)   18 146 lb 3.2 oz (66.3 kg)   18 152 lb 9.6 oz (69.2 kg)     Temp Readings from Last 3 Encounters:   18 97.9 °F (36.6 °C) (Oral)   18 98 °F (36.7 °C) (Oral)   18 97.9 °F (36.6 °C) (Oral)     BP Readings from Last 3 Encounters:   18 100/68   18 92/60   18 90/58     Pulse Readings from Last 3 Encounters:   18 90   18 84   18 85         Learning Assessment:  :     Learning Assessment 2014   PRIMARY LEARNER Patient Patient   HIGHEST LEVEL OF EDUCATION - PRIMARY LEARNER  DID NOT GRADUATE HIGH SCHOOL DID NOT GRADUATE HIGH SCHOOL   BARRIERS PRIMARY LEARNER - NONE   CO-LEARNER CAREGIVER - No   PRIMARY LANGUAGE ENGLISH ENGLISH   LEARNER PREFERENCE PRIMARY DEMONSTRATION DEMONSTRATION   ANSWERED BY patient patient    RELATIONSHIP SELF SELF       Depression Screening:  :     PHQ over the last two weeks 2018   PHQ Not Done Active Diagnosis of Depression or Bipolar Disorder   Little interest or pleasure in doing things -   Feeling down, depressed or hopeless -   Total Score PHQ 2 -       Fall Risk Assessment:  :     Fall Risk Assessment, last 12 mths 3/6/2017   Able to walk? Yes   Fall in past 12 months? No       Abuse Screening:  :     Abuse Screening Questionnaire 2018 3/6/2017 1/15/2016 2014   Do you ever feel afraid of your partner? N N N N   Are you in a relationship with someone who physically or mentally threatens you? N N N N   Is it safe for you to go home?  Y Y Y Y       Coordination of Care Questionnaire:  :     1) Have you been to an emergency room, urgent care clinic since your last visit? no   Hospitalized since your last visit? no             2) Have you seen or consulted any other health care providers outside of [address] [place] [address] since your last visit? no  (Include any pap smears or colon screenings in this section.)    3) Do you have an Advance Directive on file? no  Are you interested in receiving information about Advance Directives? no    Reviewed record in preparation for visit and have obtained necessary documentation. Medication reconciliation up to date and corrected with patient at this time.

## 2018-05-18 NOTE — PATIENT INSTRUCTIONS
High Cholesterol: Care Instructions  Your Care Instructions    Cholesterol is a type of fat in your blood. It is needed for many body functions, such as making new cells. Cholesterol is made by your body. It also comes from food you eat. High cholesterol means that you have too much of the fat in your blood. This raises your risk of a heart attack and stroke. LDL and HDL are part of your total cholesterol. LDL is the \"bad\" cholesterol. High LDL can raise your risk for heart disease, heart attack, and stroke. HDL is the \"good\" cholesterol. It helps clear bad cholesterol from the body. High HDL is linked with a lower risk of heart disease, heart attack, and stroke. Your cholesterol levels help your doctor find out your risk for having a heart attack or stroke. You and your doctor can talk about whether you need to lower your risk and what treatment is best for you. A heart-healthy lifestyle along with medicines can help lower your cholesterol and your risk. The way you choose to lower your risk will depend on how high your risk is for heart attack and stroke. It will also depend on how you feel about taking medicines. Follow-up care is a key part of your treatment and safety. Be sure to make and go to all appointments, and call your doctor if you are having problems. It's also a good idea to know your test results and keep a list of the medicines you take. How can you care for yourself at home? · Eat a variety of foods every day. Good choices include fruits, vegetables, whole grains (like oatmeal), dried beans and peas, nuts and seeds, soy products (like tofu), and fat-free or low-fat dairy products. · Replace butter, margarine, and hydrogenated or partially hydrogenated oils with olive and canola oils. (Canola oil margarine without trans fat is fine.)  · Replace red meat with fish, poultry, and soy protein (like tofu). · Limit processed and packaged foods like chips, crackers, and cookies.   · Bake, broil, or steam foods. Don't diaz them. · Be physically active. Get at least 30 minutes of exercise on most days of the week. Walking is a good choice. You also may want to do other activities, such as running, swimming, cycling, or playing tennis or team sports. · Stay at a healthy weight or lose weight by making the changes in eating and physical activity listed above. Losing just a small amount of weight, even 5 to 10 pounds, can reduce your risk for having a heart attack or stroke. · Do not smoke. When should you call for help? Watch closely for changes in your health, and be sure to contact your doctor if:  ? · You need help making lifestyle changes. ? · You have questions about your medicine. Where can you learn more? Go to http://smoothReviews42connie.info/. Enter Z734 in the search box to learn more about \"High Cholesterol: Care Instructions. \"  Current as of: September 21, 2016  Content Version: 11.4  © 1617-7708 [employer]. Care instructions adapted under license by [employer] (which disclaims liability or warranty for this information). If you have questions about a medical condition or this instruction, always ask your healthcare professional. [address] any warranty or liability for your use of this information. Hypothyroidism: Care Instructions  Your Care Instructions    You have hypothyroidism, which means that your body is not making enough thyroid hormone. This hormone helps your body use energy. If your thyroid level is low, you may feel tired, be constipated, have an increase in your blood pressure, or have dry skin or memory problems. You may also get cold easily, even when it is warm. Women with low thyroid levels may have heavy menstrual periods. A blood test to find your thyroid-stimulating hormone (TSH) level is used to check for hypothyroidism. A high TSH level may mean that you have low thyroid.  When your body is not making enough thyroid hormone, TSH levels rise in an effort to make the body produce more. The treatment for hypothyroidism is to take thyroid hormone pills. You should start to feel better in 1 to 2 weeks. But it can take several months to see changes in the TSH level. You will need regular visits with your doctor to make sure you have the right dose of medicine. Most people need treatment for the rest of their lives. You will need to see your doctor regularly to have blood tests and to make sure you are doing well. Follow-up care is a key part of your treatment and safety. Be sure to make and go to all appointments, and call your doctor if you are having problems. It's also a good idea to know your test results and keep a list of the medicines you take. How can you care for yourself at home? · Take your thyroid hormone medicine exactly as prescribed. Call your doctor if you think you are having a problem with your medicine. Most people do not have side effects if they take the right amount of medicine regularly. ¨ Take the medicine 30 minutes before breakfast, and do not take it with calcium, vitamins, or iron. ¨ Do not take extra doses of your thyroid medicine. It will not help you get better any faster, and it may cause side effects. ¨ If you forget to take a dose, do NOT take a double dose of medicine. Take your usual dose the next day. · Tell your doctor about all prescription, herbal, or over-the-counter products you take. · Take care of yourself. Eat a healthy diet, get enough sleep, and get regular exercise. When should you call for help? Call 911 anytime you think you may need emergency care. For example, call if:  ? · You passed out (lost consciousness). ? · You have severe trouble breathing. ? · You have a very slow heartbeat (less than 60 beats a minute). ? · You have a low body temperature (95°F or below).    ?Call your doctor now or seek immediate medical care if:  ? · You feel tired, sluggish, or weak. ? · You have trouble remembering things or concentrating. ? · You do not begin to feel better 2 weeks after starting your medicine. ? Watch closely for changes in your health, and be sure to contact your doctor if you have any problems. Where can you learn more? Go to http://smooth-connie.info/. Enter C606 in the search box to learn more about \"Hypothyroidism: Care Instructions. \"  Current as of: May 12, 2017  Content Version: 11.4  © 5331-5060 [employer]. Care instructions adapted under license by [employer] (which disclaims liability or warranty for this information). If you have questions about a medical condition or this instruction, always ask your healthcare professional. [address] any warranty or liability for your use of this information.

## 2018-05-18 NOTE — PROGRESS NOTES
HISTORY OF PRESENT ILLNESS  Breann Bennett is a 57 y.o. female. HPI  FU abn lab results: high chol, elevated TSH, low Vit D. She does experience a lot of fatigue. ROS  Visit Vitals    /68 (BP 1 Location: Right arm, BP Patient Position: Sitting)  Comment: manual    Pulse 90    Temp 97.9 °F (36.6 °C) (Oral)    Resp 20    Ht 5' 3\" (1.6 m)    Wt 145 lb (65.8 kg)    LMP 01/09/2010    SpO2 98%    BMI 25.69 kg/m2       Physical Exam    ASSESSMENT and PLAN    ICD-10-CM ICD-9-CM    1. Hypercholesteremia E78.00 272.0    2. Abnormal thyroid blood test R94.6 794.5 TSH 3RD GENERATION      T4, FREE   3. Vitamin D deficiency E55.9 268.9 cholecalciferol (VITAMIN D3) 1,000 unit cap     Check TSH and free T4. Reviewed low fat diet. Recheck lipids in 3 months. Take Vit D supplement. Patient Instructions          High Cholesterol: Care Instructions  Your Care Instructions    Cholesterol is a type of fat in your blood. It is needed for many body functions, such as making new cells. Cholesterol is made by your body. It also comes from food you eat. High cholesterol means that you have too much of the fat in your blood. This raises your risk of a heart attack and stroke. LDL and HDL are part of your total cholesterol. LDL is the \"bad\" cholesterol. High LDL can raise your risk for heart disease, heart attack, and stroke. HDL is the \"good\" cholesterol. It helps clear bad cholesterol from the body. High HDL is linked with a lower risk of heart disease, heart attack, and stroke. Your cholesterol levels help your doctor find out your risk for having a heart attack or stroke. You and your doctor can talk about whether you need to lower your risk and what treatment is best for you. A heart-healthy lifestyle along with medicines can help lower your cholesterol and your risk. The way you choose to lower your risk will depend on how high your risk is for heart attack and stroke.  It will also depend on how you feel about taking medicines. Follow-up care is a key part of your treatment and safety. Be sure to make and go to all appointments, and call your doctor if you are having problems. It's also a good idea to know your test results and keep a list of the medicines you take. How can you care for yourself at home? · Eat a variety of foods every day. Good choices include fruits, vegetables, whole grains (like oatmeal), dried beans and peas, nuts and seeds, soy products (like tofu), and fat-free or low-fat dairy products. · Replace butter, margarine, and hydrogenated or partially hydrogenated oils with olive and canola oils. (Canola oil margarine without trans fat is fine.)  · Replace red meat with fish, poultry, and soy protein (like tofu). · Limit processed and packaged foods like chips, crackers, and cookies. · Bake, broil, or steam foods. Don't diaz them. · Be physically active. Get at least 30 minutes of exercise on most days of the week. Walking is a good choice. You also may want to do other activities, such as running, swimming, cycling, or playing tennis or team sports. · Stay at a healthy weight or lose weight by making the changes in eating and physical activity listed above. Losing just a small amount of weight, even 5 to 10 pounds, can reduce your risk for having a heart attack or stroke. · Do not smoke. When should you call for help? Watch closely for changes in your health, and be sure to contact your doctor if:  ? · You need help making lifestyle changes. ? · You have questions about your medicine. Where can you learn more? Go to http://smooth-connie.info/. Enter J167 in the search box to learn more about \"High Cholesterol: Care Instructions. \"  Current as of: September 21, 2016  Content Version: 11.4  © 9198-2233 [employer]. Care instructions adapted under license by [employer] (which disclaims liability or warranty for this information).  If you have questions about a medical condition or this instruction, always ask your healthcare professional. [address] any warranty or liability for your use of this information. Hypothyroidism: Care Instructions  Your Care Instructions    You have hypothyroidism, which means that your body is not making enough thyroid hormone. This hormone helps your body use energy. If your thyroid level is low, you may feel tired, be constipated, have an increase in your blood pressure, or have dry skin or memory problems. You may also get cold easily, even when it is warm. Women with low thyroid levels may have heavy menstrual periods. A blood test to find your thyroid-stimulating hormone (TSH) level is used to check for hypothyroidism. A high TSH level may mean that you have low thyroid. When your body is not making enough thyroid hormone, TSH levels rise in an effort to make the body produce more. The treatment for hypothyroidism is to take thyroid hormone pills. You should start to feel better in 1 to 2 weeks. But it can take several months to see changes in the TSH level. You will need regular visits with your doctor to make sure you have the right dose of medicine. Most people need treatment for the rest of their lives. You will need to see your doctor regularly to have blood tests and to make sure you are doing well. Follow-up care is a key part of your treatment and safety. Be sure to make and go to all appointments, and call your doctor if you are having problems. It's also a good idea to know your test results and keep a list of the medicines you take. How can you care for yourself at home? · Take your thyroid hormone medicine exactly as prescribed. Call your doctor if you think you are having a problem with your medicine. Most people do not have side effects if they take the right amount of medicine regularly.   ¨ Take the medicine 30 minutes before breakfast, and do not take it with calcium, vitamins, or iron. ¨ Do not take extra doses of your thyroid medicine. It will not help you get better any faster, and it may cause side effects. ¨ If you forget to take a dose, do NOT take a double dose of medicine. Take your usual dose the next day. · Tell your doctor about all prescription, herbal, or over-the-counter products you take. · Take care of yourself. Eat a healthy diet, get enough sleep, and get regular exercise. When should you call for help? Call 911 anytime you think you may need emergency care. For example, call if:  ? · You passed out (lost consciousness). ? · You have severe trouble breathing. ? · You have a very slow heartbeat (less than 60 beats a minute). ? · You have a low body temperature (95°F or below). ?Call your doctor now or seek immediate medical care if:  ? · You feel tired, sluggish, or weak. ? · You have trouble remembering things or concentrating. ? · You do not begin to feel better 2 weeks after starting your medicine. ? Watch closely for changes in your health, and be sure to contact your doctor if you have any problems. Where can you learn more? Go to http://smooth-connie.info/. Enter V944 in the search box to learn more about \"Hypothyroidism: Care Instructions. \"  Current as of: May 12, 2017  Content Version: 11.4  © 8281-1289 [employer]. Care instructions adapted under license by [employer] (which disclaims liability or warranty for this information). If you have questions about a medical condition or this instruction, always ask your healthcare professional. [address] any warranty or liability for your use of this information.

## 2018-05-19 LAB
T4 FREE SERPL-MCNC: 0.83 NG/DL (ref 0.82–1.77)
TSH SERPL DL<=0.005 MIU/L-ACNC: 2.98 UIU/ML (ref 0.45–4.5)

## 2018-05-20 ENCOUNTER — TELEPHONE (OUTPATIENT)
Dept: FAMILY MEDICINE CLINIC | Age: 48
End: 2018-05-20

## 2018-05-20 DIAGNOSIS — E03.9 ACQUIRED HYPOTHYROIDISM: Primary | ICD-10-CM

## 2018-05-20 RX ORDER — LEVOTHYROXINE SODIUM 25 UG/1
25 TABLET ORAL
Qty: 90 TAB | Refills: 1 | Status: SHIPPED | OUTPATIENT
Start: 2018-05-20 | End: 2018-11-10 | Stop reason: SDUPTHER

## 2018-05-20 NOTE — TELEPHONE ENCOUNTER
Call to advise pt her free T4 level is low. I suggest starting on Levothyroxine. Plan repeat thyroid lab in 2 months.

## 2018-05-23 NOTE — TELEPHONE ENCOUNTER
She will try not to take her neurontin for a hour after taking the synthroid. She is not worried about eating. Her neck wakes her up in the morning. She will come back in 2 months to redo labs.

## 2018-06-10 RX ORDER — TRAMADOL HYDROCHLORIDE 50 MG/1
TABLET ORAL
Qty: 90 TAB | OUTPATIENT
Start: 2018-06-10

## 2018-06-18 RX ORDER — TOPIRAMATE 100 MG/1
TABLET, FILM COATED ORAL
Qty: 60 TAB | Refills: 0 | OUTPATIENT
Start: 2018-06-18

## 2018-08-03 ENCOUNTER — OFFICE VISIT (OUTPATIENT)
Dept: FAMILY MEDICINE CLINIC | Age: 48
End: 2018-08-03

## 2018-08-03 VITALS
WEIGHT: 136.2 LBS | BODY MASS INDEX: 24.13 KG/M2 | SYSTOLIC BLOOD PRESSURE: 96 MMHG | RESPIRATION RATE: 20 BRPM | HEART RATE: 86 BPM | OXYGEN SATURATION: 98 % | TEMPERATURE: 98 F | HEIGHT: 63 IN | DIASTOLIC BLOOD PRESSURE: 64 MMHG

## 2018-08-03 DIAGNOSIS — M54.2 CHRONIC NECK PAIN: Primary | ICD-10-CM

## 2018-08-03 DIAGNOSIS — G89.29 CHRONIC NECK PAIN: Primary | ICD-10-CM

## 2018-08-03 DIAGNOSIS — E03.9 ACQUIRED HYPOTHYROIDISM: ICD-10-CM

## 2018-08-03 DIAGNOSIS — E78.00 HYPERCHOLESTEREMIA: ICD-10-CM

## 2018-08-03 RX ORDER — TRAMADOL HYDROCHLORIDE 50 MG/1
TABLET ORAL
Qty: 90 TAB | Refills: 0 | Status: SHIPPED | OUTPATIENT
Start: 2018-08-03 | End: 2019-05-16 | Stop reason: ALTCHOICE

## 2018-08-03 NOTE — PROGRESS NOTES
Identified pt with two pt identifiers(name and ). Chief Complaint   Patient presents with    Neck Pain     Worsening neck pain x 1 week        Health Maintenance Due   Topic    Influenza Age 5 to Adult        Wt Readings from Last 3 Encounters:   18 145 lb (65.8 kg)   18 146 lb 3.2 oz (66.3 kg)   18 152 lb 9.6 oz (69.2 kg)     Temp Readings from Last 3 Encounters:   18 97.9 °F (36.6 °C) (Oral)   18 98 °F (36.7 °C) (Oral)   18 97.9 °F (36.6 °C) (Oral)     BP Readings from Last 3 Encounters:   18 100/68   18 92/60   18 90/58     Pulse Readings from Last 3 Encounters:   18 90   18 84   18 85         Learning Assessment:  :     Learning Assessment 2014   PRIMARY LEARNER Patient Patient   HIGHEST LEVEL OF EDUCATION - PRIMARY LEARNER  DID NOT GRADUATE HIGH SCHOOL DID NOT GRADUATE HIGH SCHOOL   BARRIERS PRIMARY LEARNER - NONE   CO-LEARNER CAREGIVER - No   PRIMARY LANGUAGE ENGLISH ENGLISH   LEARNER PREFERENCE PRIMARY DEMONSTRATION DEMONSTRATION   ANSWERED BY patient patient    RELATIONSHIP SELF SELF       Depression Screening:  :     PHQ over the last two weeks 2018   PHQ Not Done Active Diagnosis of Depression or Bipolar Disorder   Little interest or pleasure in doing things -   Feeling down, depressed, irritable, or hopeless -   Total Score PHQ 2 -       Fall Risk Assessment:  :     Fall Risk Assessment, last 12 mths 3/6/2017   Able to walk? Yes   Fall in past 12 months? No       Abuse Screening:  :     Abuse Screening Questionnaire 2018 3/6/2017 1/15/2016 2014   Do you ever feel afraid of your partner? N N N N   Are you in a relationship with someone who physically or mentally threatens you? N N N N   Is it safe for you to go home?  Y Y Y Y       Coordination of Care Questionnaire:  :     1) Have you been to an emergency room, urgent care clinic since your last visit? no   Hospitalized since your last visit? no 2) Have you seen or consulted any other health care providers outside of [address] [place] [address] since your last visit? no  (Include any pap smears or colon screenings in this section.)    3) Do you have an Advance Directive on file? no  Are you interested in receiving information about Advance Directives? no    Reviewed record in preparation for visit and have obtained necessary documentation. Medication reconciliation up to date and corrected with patient at this time.

## 2018-08-03 NOTE — PROGRESS NOTES
Subjective:      Margaret Colvin is a 57 y.o. female here with complaint of neck and back pain. States she has an ongoing issue with \"inflammed nerve below C7\" which ha been ongoing for several months. Reports muscle knots of the neck and shoulder especially on the right. She has been preparing for her daughter's wedding and has been doing a lot of DIY products which she feels as aggravated her symptoms. Denies paresthesias of the upper extremities, weakness, decreased  strength. She has previously been treated with Tramadol as needed for severe pain; requested refill from PCP but advised office visit needed. Additional concerns:   - Fasting for labs. Needs TSH check on new dose of levothyroxine. Current Outpatient Prescriptions   Medication Sig Dispense Refill    levothyroxine (SYNTHROID) 25 mcg tablet Take 1 Tab by mouth Daily (before breakfast). Indications: hypothyroidism 90 Tab 1    cholecalciferol (VITAMIN D3) 1,000 unit cap Take 1 Cap by mouth daily. 30 Cap 12    topiramate (TOPAMAX) 100 mg tablet TAKE 1 TAB BY MOUTH TWO (2) TIMES A DAY. 60 Tab 3    butalbital-acetaminophen-caffeine (FIORICET, ESGIC) -40 mg per tablet TAKE ONE TABLET BY MOUTH EVERY 6 HOURS AS NEEDED FOR HEADACHE. MAX DAILY AMOUNT 4TABS 30 Tab 3    fluticasone (FLONASE ALLERGY RELIEF) 50 mcg/actuation nasal spray 2 Sprays by Both Nostrils route daily.  sertraline (ZOLOFT) 100 mg tablet Take 1 Tab by mouth daily. Indications: ANXIETY WITH DEPRESSION 30 Tab 3    nortriptyline (PAMELOR) 25 mg capsule TAKE 1-2 CAPSULES BY MOUTH AT BEDTIME 60 Cap 5    gabapentin (NEURONTIN) 800 mg tablet TAKE 1 TABLET BY MOUTH THREE TIMES DAILY 90 Tab 4    SUMAtriptan (IMITREX) 100 mg tablet TAKE 1 TABLET BY MOUTH ONCE AS NEEDED FOR MIGRAINE FOR UP TO 9 DOSES 9 Tab 5    cetirizine (ZYRTEC) 10 mg tablet Take 1 Tab by mouth daily.  30 Tab 3    cyclobenzaprine (FLEXERIL) 10 mg tablet TAKE 1 TABLET BY MOUTH EACH NIGHT AT BEDTIME 30 Tab 5       Allergies   Allergen Reactions    Latex Rash    Erythromycin Nausea and Vomiting    Vancomycin Swelling       Past Medical History:   Diagnosis Date    Acquired hypothyroidism 5/20/2018    Cervicalgia     Chronic neck pain 4/3/2013    Hypercholesteremia 5/18/2018    Migraine with aura, without mention of intractable migraine without mention of status migrainosus     Moderate episode of recurrent major depressive disorder ([facility] [address] 11/2/2016       Social History   Substance Use Topics    Smoking status: Never Smoker    Smokeless tobacco: Never Used    Alcohol use No        Review of Systems  Pertinent items are noted in HPI. Objective:     Visit Vitals    BP 96/64 (BP 1 Location: Left arm, BP Patient Position: Sitting)    Pulse 86    Temp 98 °F (36.7 °C) (Oral)    Resp 20    Ht 5' 3\" (1.6 m)    Wt 136 lb 3.2 oz (61.8 kg)    LMP 01/09/2010    SpO2 98%    BMI 24.13 kg/m2      General appearance - alert, well appearing, and in no distress  Chest - clear to auscultation, no wheezes, rales or rhonchi, symmetric air entry, no tachypnea, retractions or cyanosis  Heart - normal rate, regular rhythm, normal S1, S2, no murmurs, rubs, clicks or gallops  Musculoskeletal - (+) C spine tenderness with palpable muscle tension of the cervical paraspinal muscle bilaterally, FROM    Assessment/Plan:   Eduin Miranda is a 57 y.o. female seen for:     1. Chronic neck pain: prescription reviewed and appropriate. Takes medication on PRN basis. - traMADol (ULTRAM) 50 mg tablet; TAKE 1 TABLET EVERY 8 HOURS AS NEEDED FOR PAIN. MAX DAILY AMOUNT 3 TABS  Dispense: 90 Tab; Refill: 0    2. Acquired hypothyroidism: needs TSH check on new dose of levothyroxine.   - TSH 3RD GENERATION  - T4, FREE    3. Hypercholesteremia: she is fasting today, will obtain lipid panel.  Previous elevation likely secondary to uncontrolled hypothyroidism.   - LIPID PANEL    I have discussed the diagnosis with the patient and the intended plan as seen in the above orders. The patient has received an after-visit summary and questions were answered concerning future plans. I have discussed medication side effects and warnings with the patient as well. Patient verbalizes understanding of plan of care and denies further questions or concerns at this time. Informed patient to return to the office if symptoms worsen or if new symptoms arise. Follow-up Disposition:  Return if symptoms worsen or fail to improve.

## 2018-08-03 NOTE — PATIENT INSTRUCTIONS

## 2018-08-04 LAB
CHOLEST SERPL-MCNC: 211 MG/DL (ref 100–199)
HDLC SERPL-MCNC: 43 MG/DL
INTERPRETATION, 910389: NORMAL
LDLC SERPL CALC-MCNC: 123 MG/DL (ref 0–99)
T4 FREE SERPL-MCNC: 0.88 NG/DL (ref 0.82–1.77)
TRIGL SERPL-MCNC: 223 MG/DL (ref 0–149)
TSH SERPL DL<=0.005 MIU/L-ACNC: 1.96 UIU/ML (ref 0.45–4.5)
VLDLC SERPL CALC-MCNC: 45 MG/DL (ref 5–40)

## 2018-08-10 ENCOUNTER — TELEPHONE (OUTPATIENT)
Dept: FAMILY MEDICINE CLINIC | Age: 48
End: 2018-08-10

## 2018-08-15 ENCOUNTER — OFFICE VISIT (OUTPATIENT)
Dept: FAMILY MEDICINE CLINIC | Age: 48
End: 2018-08-15

## 2018-08-15 VITALS
SYSTOLIC BLOOD PRESSURE: 80 MMHG | DIASTOLIC BLOOD PRESSURE: 60 MMHG | TEMPERATURE: 98 F | OXYGEN SATURATION: 97 % | WEIGHT: 134.2 LBS | RESPIRATION RATE: 20 BRPM | HEART RATE: 79 BPM | HEIGHT: 63 IN | BODY MASS INDEX: 23.78 KG/M2

## 2018-08-15 DIAGNOSIS — R10.9 ABDOMINAL PAIN, UNSPECIFIED ABDOMINAL LOCATION: ICD-10-CM

## 2018-08-15 DIAGNOSIS — E78.00 HYPERCHOLESTEREMIA: ICD-10-CM

## 2018-08-15 DIAGNOSIS — G43.509 PERSISTENT MIGRAINE AURA WITHOUT CEREBRAL INFARCTION AND WITHOUT STATUS MIGRAINOSUS, NOT INTRACTABLE: ICD-10-CM

## 2018-08-15 DIAGNOSIS — N39.0 URINARY TRACT INFECTION WITHOUT HEMATURIA, SITE UNSPECIFIED: Primary | ICD-10-CM

## 2018-08-15 DIAGNOSIS — R19.7 DIARRHEA, UNSPECIFIED TYPE: ICD-10-CM

## 2018-08-15 DIAGNOSIS — E03.9 ACQUIRED HYPOTHYROIDISM: ICD-10-CM

## 2018-08-15 DIAGNOSIS — E86.0 DEHYDRATION: ICD-10-CM

## 2018-08-15 LAB
BILIRUB UR QL STRIP: NEGATIVE
GLUCOSE UR-MCNC: NEGATIVE MG/DL
KETONES P FAST UR STRIP-MCNC: NEGATIVE MG/DL
PH UR STRIP: 7.5 [PH] (ref 4.6–8)
PROT UR QL STRIP: NEGATIVE
SP GR UR STRIP: 1.01 (ref 1–1.03)
UA UROBILINOGEN AMB POC: NORMAL (ref 0.2–1)
URINALYSIS CLARITY POC: CLEAR
URINALYSIS COLOR POC: YELLOW
URINE BLOOD POC: NEGATIVE
URINE LEUKOCYTES POC: NEGATIVE
URINE NITRITES POC: NEGATIVE

## 2018-08-15 RX ORDER — SUMATRIPTAN 100 MG/1
TABLET, FILM COATED ORAL
Qty: 9 TAB | Refills: 5 | Status: SHIPPED | OUTPATIENT
Start: 2018-08-15 | End: 2019-08-16 | Stop reason: SDUPTHER

## 2018-08-15 RX ORDER — DICYCLOMINE HYDROCHLORIDE 10 MG/1
10 CAPSULE ORAL 3 TIMES DAILY
Qty: 30 CAP | Refills: 0 | Status: SHIPPED | OUTPATIENT
Start: 2018-08-15 | End: 2019-01-06 | Stop reason: SDUPTHER

## 2018-08-15 NOTE — PROGRESS NOTES
HISTORY OF PRESENT ILLNESS  Ruchi Edwards is a 57 y.o. female. HPI  C/O abdominal cramps, diarrhea, gassiness, and pressure. Her daughter is getting  in 16 days. ? IBS. No fever. Some nausea. Watery stools. Some BRB occasionally. Reviewed recent lab results: improved chol and normal thyroid tests. Review of Systems   Gastrointestinal: Positive for abdominal pain, diarrhea and nausea. All other systems reviewed and are negative. Visit Vitals    BP (!) 80/60 (BP 1 Location: Left arm, BP Patient Position: Sitting)  Comment: manual    Pulse 79    Temp 98 °F (36.7 °C) (Oral)    Resp 20    Ht 5' 3\" (1.6 m)    Wt 134 lb 3.2 oz (60.9 kg)    LMP 01/09/2010    SpO2 97%    BMI 23.77 kg/m2       Physical Exam   Constitutional: She appears well-developed and well-nourished. Abdominal: Soft. Normal appearance and bowel sounds are normal. She exhibits distension. She exhibits no mass. There is tenderness. There is no rigidity, no rebound and no guarding. Vitals reviewed. ASSESSMENT and PLAN    ICD-10-CM ICD-9-CM    1. Urinary tract infection without hematuria, site unspecified N39.0 599.0 AMB POC URINALYSIS DIP STICK AUTO W/O MICRO   2. Persistent migraine aura without cerebral infarction and without status migrainosus, not intractable G43.509 346.50 SUMAtriptan (IMITREX) 100 mg tablet   3. Abdominal pain, unspecified abdominal location R10.9 789.00 CBC WITH AUTOMATED DIFF      METABOLIC PANEL, COMPREHENSIVE   4. Diarrhea, unspecified type R19.7 787.91 CULTURE, STOOL      OVA & PARASITES, STOOL      C DIFFICILE TOXIN A & B BY EIA      dicyclomine (BENTYL) 10 mg capsule   5. Hypercholesteremia E78.00 272.0    6. Acquired hypothyroidism E03.9 244.9    7. Dehydration E86.0 276.51      Possible stress / IBS flare up with upcoming wedding of daughter. Order labs and stool tests to R/O infection. Push oral fluids. Trial Bentyl.

## 2018-08-15 NOTE — MR AVS SNAPSHOT
[address] [address] [address] [facility] 
[phone] Patient: Fawn Li MRN: [id number] PUC:9/87/9761 Visit Information Date & Time Provider Department Dept. Phone Encounter #  
 1/49/3128 89:20 AM Patrick Hollins Jules [phone] [id number] Upcoming Health Maintenance Date Due Influenza Age 5 to Adult 8/1/2018 DTaP/Tdap/Td series (2 - Td) 8/31/2025 Allergies as of 8/15/2018  Review Complete On: 2/88/8913 By: Juan Roy MD  
  
 Severity Noted Reaction Type Reactions Latex  11/20/2017    Rash Erythromycin  01/21/2015    Nausea and Vomiting Vancomycin  12/02/2013    Swelling Current Immunizations  Reviewed on 10/3/2017 Name Date Influenza Vaccine (Quad) PF 10/3/2017 11:00 AM, 11/2/2016 Tdap 8/31/2015 12:12 PM  
  
 Not reviewed this visit You Were Diagnosed With   
  
 Codes Comments Urinary tract infection without hematuria, site unspecified    -  Primary ICD-10-CM: N39.0 ICD-9-CM: 599.0 Persistent migraine aura without cerebral infarction and without status migrainosus, not intractable     ICD-10-CM: G43.509 ICD-9-CM: 346.50 Abdominal pain, unspecified abdominal location     ICD-10-CM: R10.9 ICD-9-CM: 789.00 Diarrhea, unspecified type     ICD-10-CM: R19.7 ICD-9-CM: 787.91 Hypercholesteremia     ICD-10-CM: E78.00 ICD-9-CM: 272.0 Acquired hypothyroidism     ICD-10-CM: E03.9 ICD-9-CM: 244.9 Dehydration     ICD-10-CM: E86.0 ICD-9-CM: 276.51 Vitals BP Pulse Temp Resp Height(growth percentile) Weight(growth percentile) (!) 80/60 (BP 1 Location: Left arm, BP Patient Position: Sitting) 79 98 °F (36.7 °C) (Oral) 20 5' 3\" (1.6 m) 134 lb 3.2 oz (60.9 kg) LMP SpO2 BMI OB Status Smoking Status 01/09/2010 97% 23.77 kg/m2 Hysterectomy Never Smoker Vitals History BMI and BSA Data Body Mass Index Body Surface Area  
 23.77 kg/m 2 1.65 m 2 Preferred Pharmacy Pharmacy Name Phone [facility]/PHARMACY #[id number] [address] [phone] Your Updated Medication List  
  
   
This list is accurate as of 8/15/18 12:01 PM.  Always use your most recent med list.  
  
  
  
  
 butalbital-acetaminophen-caffeine -40 mg per tablet Commonly known as:  FIORICET, ESGIC  
TAKE ONE TABLET BY MOUTH EVERY 6 HOURS AS NEEDED FOR HEADACHE. MAX DAILY AMOUNT 4TABS  
  
 cetirizine 10 mg tablet Commonly known as:  ZyrTEC Take 1 Tab by mouth daily. cyclobenzaprine 10 mg tablet Commonly known as:  FLEXERIL  
TAKE 1 TABLET BY MOUTH EACH NIGHT AT BEDTIME  
  
 dicyclomine 10 mg capsule Commonly known as:  BENTYL Take 1 Cap by mouth three (3) times daily. Indications: Irritable Bowel Syndrome FLONASE ALLERGY RELIEF 50 mcg/actuation nasal spray Generic drug:  fluticasone 2 Sprays by Both Nostrils route daily. gabapentin 800 mg tablet Commonly known as:  NEURONTIN  
TAKE 1 TABLET BY MOUTH THREE TIMES DAILY  
  
 levothyroxine 25 mcg tablet Commonly known as:  SYNTHROID Take 1 Tab by mouth Daily (before breakfast). Indications: hypothyroidism  
  
 nortriptyline 25 mg capsule Commonly known as:  PAMELOR  
TAKE 1-2 CAPSULES BY MOUTH AT BEDTIME  
  
 sertraline 100 mg tablet Commonly known as:  ZOLOFT Take 1 Tab by mouth daily. Indications: ANXIETY WITH DEPRESSION  
  
 SUMAtriptan 100 mg tablet Commonly known as:  IMITREX  
TAKE 1 TABLET BY MOUTH ONCE AS NEEDED FOR MIGRAINE FOR UP TO 9 DOSES  
  
 topiramate 100 mg tablet Commonly known as:  TOPAMAX TAKE 1 TAB BY MOUTH TWO (2) TIMES A DAY. traMADol 50 mg tablet Commonly known as:  ULTRAM  
TAKE 1 TABLET EVERY 8 HOURS AS NEEDED FOR PAIN. MAX DAILY AMOUNT 3 TABS  
  
 VITAMIN D3 1,000 unit Cap Generic drug:  cholecalciferol Take 1 Cap by mouth daily. Prescriptions Sent to Pharmacy Refills SUMAtriptan (IMITREX) 100 mg tablet 5 Sig: TAKE 1 TABLET BY MOUTH ONCE AS NEEDED FOR MIGRAINE FOR UP TO 9 DOSES Class: Normal  
 Pharmacy: [address] [place] [address] Ph #: [phone]  
 dicyclomine (BENTYL) 10 mg capsule 0 Sig: Take 1 Cap by mouth three (3) times daily. Indications: Irritable Bowel Syndrome Class: Normal  
 Pharmacy: [facility]/pharmacy [address], [address] [facility] Ph #: [phone] Route: Oral  
  
We Performed the Following AMB POC URINALYSIS DIP STICK AUTO W/O MICRO [00126 CPT(R)] C DIFFICILE TOXIN A & B BY EIA [30769 CPT(R)] CBC WITH AUTOMATED DIFF [05222 CPT(R)] CULTURE, STOOL [id number] CPT(R)] METABOLIC PANEL, COMPREHENSIVE [96487 CPT(R)] OVA & PARASITES, STOOL [id number] CPT(R)] Patient Instructions Dehydration: Care Instructions Your Care Instructions Dehydration happens when your body loses too much fluid. This might happen when you do not drink enough water or you lose large amounts of fluids from your body because of diarrhea, vomiting, or sweating. Severe dehydration can be life-threatening. Water and minerals called electrolytes help put your body fluids back in balance. Learn the early signs of fluid loss, and drink more fluids to prevent dehydration. Follow-up care is a key part of your treatment and safety. Be sure to make and go to all appointments, and call your doctor if you are having problems. It's also a good idea to know your test results and keep a list of the medicines you take. How can you care for yourself at home? · To prevent dehydration, drink plenty of fluids, enough so that your urine is light yellow or clear like water. Choose water and other caffeine-free clear liquids until you feel better.  If you have kidney, heart, or liver disease and have to limit fluids, talk with your doctor before you increase the amount of fluids you drink. · If you do not feel like eating or drinking, try taking small sips of water, sports drinks, or other rehydration drinks. · Get plenty of rest. 
To prevent dehydration · Add more fluids to your diet and daily routine, unless your doctor has told you not to. · During hot weather, drink more fluids. Drink even more fluids if you exercise a lot. Stay away from drinks with alcohol or caffeine. · Watch for the symptoms of dehydration. These include: ¨ A dry, sticky mouth. ¨ Dark yellow urine, and not much of it. ¨ Dry and sunken eyes. ¨ Feeling very tired. · Learn what problems can lead to dehydration. These include: ¨ Diarrhea, fever, and vomiting. ¨ Any illness with a fever, such as pneumonia or the flu. ¨ Activities that cause heavy sweating, such as endurance races and heavy outdoor work in hot or humid weather. ¨ Alcohol or drug abuse or withdrawal. 
¨ Certain medicines, such as cold and allergy pills (antihistamines), diet pills (diuretics), and laxatives. ¨ Certain diseases, such as diabetes, cancer, and heart or kidney disease. When should you call for help? Call 911 anytime you think you may need emergency care. For example, call if: 
  · You passed out (lost consciousness).  
 Call your doctor now or seek immediate medical care if: 
  · You are confused and cannot think clearly.  
  · You are dizzy or lightheaded, or you feel like you may faint.  
  · You have signs of needing more fluids. You have sunken eyes and a dry mouth, and you pass only a little dark urine.  
  · You cannot keep fluids down.  
 Watch closely for changes in your health, and be sure to contact your doctor if: 
  · You are not making tears.  
  · Your skin is very dry and sags slowly back into place after you pinch it.  
  · Your mouth and eyes are very dry. Where can you learn more? Go to http://smooth-connie.info/. Enter Z114 in the search box to learn more about \"Dehydration: Care Instructions. \" Current as of: November 20, 2017 Content Version: 11.7 © 5347-1704 [employer]. Care instructions adapted under license by [employer] (which disclaims liability or warranty for this information). If you have questions about a medical condition or this instruction, always ask your healthcare professional. [address] any warranty or liability for your use of this information. Introducing [facility]! [employer] introduces [employer] patient portal. Now you can access parts of your medical record, email your doctor's office, and request medication refills online. 1. In your internet browser, go to [email or web address] 2. Click on the First Time User? Click Here link in the Sign In box. You will see the New Member Sign Up page. 3. Enter your [employer] Access Code exactly as it appears below. You will not need to use this code after youve completed the sign-up process. If you do not sign up before the expiration date, you must request a new code. · [employer] Access Code: [email or web address] Expires: 11/13/2018 12:01 PM 
 
4. Enter the last four digits of your Social Security Number (xxxx) and Date of Birth (mm/dd/yyyy) as indicated and click Submit. You will be taken to the next sign-up page. 5. Create a [employer]Works ID. This will be your [employer] login ID and cannot be changed, so think of one that is secure and easy to remember. 6. Create a [employer] password. You can change your password at any time. 7. Enter your Password Reset Question and Answer. This can be used at a later time if you forget your password. 8. Enter your e-mail address. You will receive e-mail notification when new information is available in [address]. 9. Click Sign Up. You can now view and download portions of your medical record. 10. Click the Download Summary menu link to download a portable copy of your medical information. If you have questions, please visit the Frequently Asked Questions section of the [employer]ot[employer] website. Remember, [employer] is NOT to be used for urgent needs. For medical emergencies, dial 911. Now available from your iPhone and Android! Please provide this summary of care documentation to your next provider. Your primary care clinician is listed as Stephanie Lux. If you have any questions after today's visit, please call [phone].

## 2018-08-15 NOTE — PROGRESS NOTES
Identified pt with two pt identifiers(name and ). Chief Complaint   Patient presents with    Abdominal Pain    Nausea    Irritable Bowel Syndrome     she has had a few months but  it was really crampy and hurts - monday was a little better     Urinary Frequency     she does not know if it is        Health Maintenance Due   Topic    Influenza Age 5 to Adult        Wt Readings from Last 3 Encounters:   08/15/18 134 lb 3.2 oz (60.9 kg)   18 136 lb 3.2 oz (61.8 kg)   18 145 lb (65.8 kg)     Temp Readings from Last 3 Encounters:   08/15/18 98 °F (36.7 °C) (Oral)   18 98 °F (36.7 °C) (Oral)   18 97.9 °F (36.6 °C) (Oral)     BP Readings from Last 3 Encounters:   08/15/18 (!) 80/60   18 96/64   18 100/68     Pulse Readings from Last 3 Encounters:   08/15/18 79   18 86   18 90         Learning Assessment:  :     Learning Assessment 2014   PRIMARY LEARNER Patient Patient   HIGHEST LEVEL OF EDUCATION - PRIMARY LEARNER  DID NOT GRADUATE HIGH SCHOOL DID NOT GRADUATE HIGH SCHOOL   BARRIERS PRIMARY LEARNER - NONE   CO-LEARNER CAREGIVER - No   PRIMARY LANGUAGE ENGLISH ENGLISH   LEARNER PREFERENCE PRIMARY DEMONSTRATION DEMONSTRATION   ANSWERED BY patient patient    RELATIONSHIP SELF SELF       Depression Screening:  :     PHQ over the last two weeks 2018   PHQ Not Done Active Diagnosis of Depression or Bipolar Disorder   Little interest or pleasure in doing things -   Feeling down, depressed, irritable, or hopeless -   Total Score PHQ 2 -       Fall Risk Assessment:  :     Fall Risk Assessment, last 12 mths 3/6/2017   Able to walk? Yes   Fall in past 12 months? No       Abuse Screening:  :     Abuse Screening Questionnaire 2018 3/6/2017 1/15/2016 2014   Do you ever feel afraid of your partner? N N N N   Are you in a relationship with someone who physically or mentally threatens you? N N N N   Is it safe for you to go home?  [facility] Coordination of Care Questionnaire:  :     1) Have you been to an emergency room, urgent care clinic since your last visit? no   Hospitalized since your last visit? no             2) Have you seen or consulted any other health care providers outside of [address] [place] [address] since your last visit? no  (Include any pap smears or colon screenings in this section.)    3) Do you have an Advance Directive on file? no  Are you interested in receiving information about Advance Directives? no    Reviewed record in preparation for visit and have obtained necessary documentation. Medication reconciliation up to date and corrected with patient at this time.      Results for orders placed or performed in visit on 08/15/18   AMB POC URINALYSIS DIP STICK AUTO W/O MICRO   Result Value Ref Range    Color (UA POC) Yellow     Clarity (UA POC) Clear     Glucose (UA POC) Negative Negative    Bilirubin (UA POC) Negative Negative    Ketones (UA POC) Negative Negative    Specific gravity (UA POC) 1.010 1.001 - 1.035    Blood (UA POC) Negative Negative    pH (UA POC) 7.5 4.6 - 8.0    Protein (UA POC) Negative Negative    Urobilinogen (UA POC) 0.2 mg/dL 0.2 - 1    Nitrites (UA POC) Negative Negative    Leukocyte esterase (UA POC) Negative Negative

## 2018-08-15 NOTE — PATIENT INSTRUCTIONS
Dehydration: Care Instructions  Your Care Instructions  Dehydration happens when your body loses too much fluid. This might happen when you do not drink enough water or you lose large amounts of fluids from your body because of diarrhea, vomiting, or sweating. Severe dehydration can be life-threatening. Water and minerals called electrolytes help put your body fluids back in balance. Learn the early signs of fluid loss, and drink more fluids to prevent dehydration. Follow-up care is a key part of your treatment and safety. Be sure to make and go to all appointments, and call your doctor if you are having problems. It's also a good idea to know your test results and keep a list of the medicines you take. How can you care for yourself at home? · To prevent dehydration, drink plenty of fluids, enough so that your urine is light yellow or clear like water. Choose water and other caffeine-free clear liquids until you feel better. If you have kidney, heart, or liver disease and have to limit fluids, talk with your doctor before you increase the amount of fluids you drink. · If you do not feel like eating or drinking, try taking small sips of water, sports drinks, or other rehydration drinks. · Get plenty of rest.  To prevent dehydration  · Add more fluids to your diet and daily routine, unless your doctor has told you not to. · During hot weather, drink more fluids. Drink even more fluids if you exercise a lot. Stay away from drinks with alcohol or caffeine. · Watch for the symptoms of dehydration. These include:  ¨ A dry, sticky mouth. ¨ Dark yellow urine, and not much of it. ¨ Dry and sunken eyes. ¨ Feeling very tired. · Learn what problems can lead to dehydration. These include:  ¨ Diarrhea, fever, and vomiting. ¨ Any illness with a fever, such as pneumonia or the flu. ¨ Activities that cause heavy sweating, such as endurance races and heavy outdoor work in hot or humid weather.   ¨ Alcohol or drug abuse or withdrawal.  ¨ Certain medicines, such as cold and allergy pills (antihistamines), diet pills (diuretics), and laxatives. ¨ Certain diseases, such as diabetes, cancer, and heart or kidney disease. When should you call for help? Call 911 anytime you think you may need emergency care. For example, call if:    · You passed out (lost consciousness).    Call your doctor now or seek immediate medical care if:    · You are confused and cannot think clearly.     · You are dizzy or lightheaded, or you feel like you may faint.     · You have signs of needing more fluids. You have sunken eyes and a dry mouth, and you pass only a little dark urine.     · You cannot keep fluids down.    Watch closely for changes in your health, and be sure to contact your doctor if:    · You are not making tears.     · Your skin is very dry and sags slowly back into place after you pinch it.     · Your mouth and eyes are very dry. Where can you learn more? Go to http://smooth-connie.info/. Enter D175 in the search box to learn more about \"Dehydration: Care Instructions. \"  Current as of: November 20, 2017  Content Version: 11.7  © 1480-5499 [employer]. Care instructions adapted under license by [employer] (which disclaims liability or warranty for this information). If you have questions about a medical condition or this instruction, always ask your healthcare professional. [address] [address] any warranty or liability for your use of this information.

## 2018-08-16 LAB
ALBUMIN SERPL-MCNC: 4.1 G/DL (ref 3.5–5.5)
ALBUMIN/GLOB SERPL: 1.5 {RATIO} (ref 1.2–2.2)
ALP SERPL-CCNC: 122 IU/L (ref 39–117)
ALT SERPL-CCNC: 11 IU/L (ref 0–32)
AST SERPL-CCNC: 17 IU/L (ref 0–40)
BASOPHILS # BLD AUTO: 0 X10E3/UL (ref 0–0.2)
BASOPHILS NFR BLD AUTO: 1 %
BILIRUB SERPL-MCNC: <0.2 MG/DL (ref 0–1.2)
BUN SERPL-MCNC: 4 MG/DL (ref 6–24)
BUN/CREAT SERPL: 4 (ref 9–23)
CALCIUM SERPL-MCNC: 9.3 MG/DL (ref 8.7–10.2)
CHLORIDE SERPL-SCNC: 102 MMOL/L (ref 96–106)
CO2 SERPL-SCNC: 26 MMOL/L (ref 20–29)
CREAT SERPL-MCNC: 0.95 MG/DL (ref 0.57–1)
EOSINOPHIL # BLD AUTO: 0.6 X10E3/UL (ref 0–0.4)
EOSINOPHIL NFR BLD AUTO: 8 %
ERYTHROCYTE [DISTWIDTH] IN BLOOD BY AUTOMATED COUNT: 14.5 % (ref 12.3–15.4)
GLOBULIN SER CALC-MCNC: 2.8 G/DL (ref 1.5–4.5)
GLUCOSE SERPL-MCNC: 72 MG/DL (ref 65–99)
HCT VFR BLD AUTO: 40.4 % (ref 34–46.6)
HGB BLD-MCNC: 13.3 G/DL (ref 11.1–15.9)
IMM GRANULOCYTES # BLD: 0 X10E3/UL (ref 0–0.1)
IMM GRANULOCYTES NFR BLD: 0 %
LYMPHOCYTES # BLD AUTO: 2.4 X10E3/UL (ref 0.7–3.1)
LYMPHOCYTES NFR BLD AUTO: 31 %
MCH RBC QN AUTO: 31.6 PG (ref 26.6–33)
MCHC RBC AUTO-ENTMCNC: 32.9 G/DL (ref 31.5–35.7)
MCV RBC AUTO: 96 FL (ref 79–97)
MONOCYTES # BLD AUTO: 0.6 X10E3/UL (ref 0.1–0.9)
MONOCYTES NFR BLD AUTO: 7 %
NEUTROPHILS # BLD AUTO: 4.1 X10E3/UL (ref 1.4–7)
NEUTROPHILS NFR BLD AUTO: 53 %
PLATELET # BLD AUTO: 258 X10E3/UL (ref 150–379)
POTASSIUM SERPL-SCNC: 3.8 MMOL/L (ref 3.5–5.2)
PROT SERPL-MCNC: 6.9 G/DL (ref 6–8.5)
RBC # BLD AUTO: 4.21 X10E6/UL (ref 3.77–5.28)
SODIUM SERPL-SCNC: 141 MMOL/L (ref 134–144)
WBC # BLD AUTO: 7.8 X10E3/UL (ref 3.4–10.8)

## 2018-08-23 DIAGNOSIS — R51.9 HEADACHE, UNSPECIFIED HEADACHE TYPE: ICD-10-CM

## 2018-08-23 LAB — C DIFF TOX A+B STL QL IA: NEGATIVE

## 2018-08-23 RX ORDER — BUTALBITAL, ACETAMINOPHEN AND CAFFEINE 50; 325; 40 MG/1; MG/1; MG/1
TABLET ORAL
Qty: 30 TAB | Refills: 3 | Status: SHIPPED | OUTPATIENT
Start: 2018-08-23 | End: 2019-03-07 | Stop reason: SDUPTHER

## 2018-08-25 LAB
BACTERIA SPEC CULT: NORMAL
BACTERIA SPEC CULT: NORMAL
CAMPYLOBACTER STL CULT: NORMAL
E COLI SXT STL QL IA: NEGATIVE
SALM + SHIG STL CULT: NORMAL

## 2018-08-27 ENCOUNTER — TELEPHONE (OUTPATIENT)
Dept: FAMILY MEDICINE CLINIC | Age: 48
End: 2018-08-27

## 2018-08-28 NOTE — TELEPHONE ENCOUNTER
She was glad. She said wedding was sat. She hopped the stress would be gone and she would feel better.

## 2018-08-29 LAB
O+P SPEC MICRO: NORMAL
O+P STL CONC: NORMAL

## 2018-09-28 RX ORDER — NORTRIPTYLINE HYDROCHLORIDE 25 MG/1
CAPSULE ORAL
Qty: 60 CAP | Refills: 5 | Status: SHIPPED | OUTPATIENT
Start: 2018-09-28 | End: 2019-04-01 | Stop reason: SDUPTHER

## 2018-10-10 ENCOUNTER — OFFICE VISIT (OUTPATIENT)
Dept: FAMILY MEDICINE CLINIC | Age: 48
End: 2018-10-10

## 2018-10-10 VITALS
SYSTOLIC BLOOD PRESSURE: 100 MMHG | HEIGHT: 63 IN | DIASTOLIC BLOOD PRESSURE: 60 MMHG | OXYGEN SATURATION: 98 % | BODY MASS INDEX: 22.93 KG/M2 | TEMPERATURE: 97.9 F | WEIGHT: 129.4 LBS | HEART RATE: 81 BPM | RESPIRATION RATE: 20 BRPM

## 2018-10-10 DIAGNOSIS — M54.5 CHRONIC LEFT-SIDED LOW BACK PAIN, WITH SCIATICA PRESENCE UNSPECIFIED: ICD-10-CM

## 2018-10-10 DIAGNOSIS — G89.29 CHRONIC LEFT-SIDED LOW BACK PAIN, WITH SCIATICA PRESENCE UNSPECIFIED: ICD-10-CM

## 2018-10-10 DIAGNOSIS — Z23 ENCOUNTER FOR IMMUNIZATION: ICD-10-CM

## 2018-10-10 DIAGNOSIS — M25.552 PAIN OF LEFT HIP JOINT: Primary | ICD-10-CM

## 2018-10-10 NOTE — PROGRESS NOTES
HISTORY OF PRESENT ILLNESS  Radu Ortega is a 57 y.o. female. HPI  C/O L hip pain x few months. Localized lateral hip. Also has some pain deep in hip. Also has low back pain on L side. She has taken muscle relaxer in past. Previous MRI imaging 2015 showed: min disc and facet degenerative changes. Review of Systems   Gastrointestinal: Positive for abdominal pain. Musculoskeletal: Positive for back pain and joint pain. All other systems reviewed and are negative. Patient Active Problem List   Diagnosis Code    Migraine with aura G43. 109    Chronic neck pain M54.2, G89.29    Tinnitus of both ears H93.13    Spasm of muscle, back M62.830    Chronic back pain M54.9, G89.29    Moderate episode of recurrent major depressive disorder (HCC) F33.1    Anxiety F41.9    Vitamin D deficiency E55.9    Hypercholesteremia E78.00    Acquired hypothyroidism E03.9     Current Outpatient Prescriptions   Medication Sig Dispense Refill    nortriptyline (PAMELOR) 25 mg capsule TAKE 1-2 CAPSULES BY MOUTH AT BEDTIME 60 Cap 5    levothyroxine (SYNTHROID) 25 mcg tablet Take 1 Tab by mouth Daily (before breakfast). Indications: hypothyroidism 90 Tab 1    cholecalciferol (VITAMIN D3) 1,000 unit cap Take 1 Cap by mouth daily. 30 Cap 12    topiramate (TOPAMAX) 100 mg tablet TAKE 1 TAB BY MOUTH TWO (2) TIMES A DAY. 60 Tab 3    sertraline (ZOLOFT) 100 mg tablet Take 1 Tab by mouth daily. Indications: ANXIETY WITH DEPRESSION 30 Tab 3    gabapentin (NEURONTIN) 800 mg tablet TAKE 1 TABLET BY MOUTH THREE TIMES DAILY 90 Tab 4    cetirizine (ZYRTEC) 10 mg tablet Take 1 Tab by mouth daily. 30 Tab 3    butalbital-acetaminophen-caffeine (FIORICET, ESGIC) -40 mg per tablet TAKE 1 TABLET BY MOUTH EVERY 6 HOURS AS NEEDED FOR HEADACHE.  MAX DAILY: 4 TABS 30 Tab 3    SUMAtriptan (IMITREX) 100 mg tablet TAKE 1 TABLET BY MOUTH ONCE AS NEEDED FOR MIGRAINE FOR UP TO 9 DOSES 9 Tab 5    dicyclomine (BENTYL) 10 mg capsule Take 1 Cap by mouth three (3) times daily. Indications: Irritable Bowel Syndrome 30 Cap 0    traMADol (ULTRAM) 50 mg tablet TAKE 1 TABLET EVERY 8 HOURS AS NEEDED FOR PAIN. MAX DAILY AMOUNT 3 TABS 90 Tab 0    fluticasone (FLONASE ALLERGY RELIEF) 50 mcg/actuation nasal spray 2 Sprays by Both Nostrils route daily.  cyclobenzaprine (FLEXERIL) 10 mg tablet TAKE 1 TABLET BY MOUTH EACH NIGHT AT BEDTIME 30 Tab 5       Visit Vitals    /60 (BP 1 Location: Right arm, BP Patient Position: Sitting)  Comment: manual    Pulse 81    Temp 97.9 °F (36.6 °C) (Oral)    Resp 20    Ht 5' 3\" (1.6 m)    Wt 129 lb 6.4 oz (58.7 kg)    LMP 01/09/2010    SpO2 98%    BMI 22.92 kg/m2       Physical Exam   Constitutional: She appears well-developed and well-nourished. Musculoskeletal:        Lumbar back: She exhibits tenderness and bony tenderness. She exhibits normal range of motion, no swelling and no edema. Back:    Vitals reviewed. ASSESSMENT and PLAN    ICD-10-CM ICD-9-CM    1. Pain of left hip joint M25.552 719.45 XR HIP LT W OR WO PELV 2-3 VWS   2. Encounter for immunization Z23 V03.89 INFLUENZA VIRUS VAC QUAD,SPLIT,PRESV FREE SYRINGE IM      [place] IMMUNIZ ADMIN,1 SINGLE/COMB VAC/TOXOID   3. Chronic left-sided low back pain, with sciatica presence unspecified M54.5 724.2     G89.29 338.29      Order hip XR  FLU vaccine given. She has no med insurance at this time except for Care Card. Patient Instructions     Vaccine Information Statement    Influenza (Flu) Vaccine (Inactivated or Recombinant): What you need to know    Many Vaccine Information Statements are available in [place] and other languages. See www.immunize.org/vis  Hojas de Información Sobre Vacunas están disponibles en Español y en muchos otros idiomas. Visite www.immunize.org/vis    1. Why get vaccinated? Influenza (flu) is a contagious disease that spreads around the [place] every year, usually between October and May.      Flu is caused by influenza viruses, and is spread mainly by coughing, sneezing, and close contact. Anyone can get flu. Flu strikes suddenly and can last several days. Symptoms vary by age, but can include:   fever/chills   sore throat   muscle aches   fatigue   cough   headache    runny or stuffy nose    Flu can also lead to pneumonia and blood infections, and cause diarrhea and seizures in children. If you have a medical condition, such as heart or lung disease, flu can make it worse. Flu is more dangerous for some people. Infants and young children, people 72years of age and older, pregnant women, and people with certain health conditions or a weakened immune system are at greatest risk. Each year thousands of people in the [facility] die from flu, and many more are hospitalized. Flu vaccine can:   keep you from getting flu,   make flu less severe if you do get it, and   keep you from spreading flu to your family and other people. 2. Inactivated and recombinant flu vaccines    A dose of flu vaccine is recommended every flu season. Children 6 months through 6years of age may need two doses during the same flu season. Everyone else needs only one dose each flu season. Some inactivated flu vaccines contain a very small amount of a mercury-based preservative called thimerosal. Studies have not shown thimerosal in vaccines to be harmful, but flu vaccines that do not contain thimerosal are available. There is no live flu virus in flu shots. They cannot cause the flu. There are many flu viruses, and they are always changing. Each year a new flu vaccine is made to protect against three or four viruses that are likely to cause disease in the upcoming flu season.  But even when the vaccine doesnt exactly match these viruses, it may still provide some protection    Flu vaccine cannot prevent:   flu that is caused by a virus not covered by the vaccine, or   illnesses that look like flu but are not. It takes about 2 weeks for protection to develop after vaccination, and protection lasts through the flu season. 3. Some people should not get this vaccine    Tell the person who is giving you the vaccine:     If you have any severe, life-threatening allergies. If you ever had a life-threatening allergic reaction after a dose of flu vaccine, or have a severe allergy to any part of this vaccine, you may be advised not to get vaccinated. Most, but not all, types of flu vaccine contain a small amount of egg protein.  If you ever had Guillain-Barré Syndrome (also called GBS). Some people with a history of GBS should not get this vaccine. This should be discussed with your doctor.  If you are not feeling well. It is usually okay to get flu vaccine when you have a mild illness, but you might be asked to come back when you feel better. 4. Risks of a vaccine reaction    With any medicine, including vaccines, there is a chance of reactions. These are usually mild and go away on their own, but serious reactions are also possible. Most people who get a flu shot do not have any problems with it. Minor problems following a flu shot include:    soreness, redness, or swelling where the shot was given     hoarseness   sore, red or itchy eyes   cough   fever   aches   headache   itching   fatigue  If these problems occur, they usually begin soon after the shot and last 1 or 2 days. More serious problems following a flu shot can include the following:     There may be a small increased risk of Guillain-Barré Syndrome (GBS) after inactivated flu vaccine. This risk has been estimated at 1 or 2 additional cases per million people vaccinated. This is much lower than the risk of severe complications from flu, which can be prevented by flu vaccine.        Young children who get the flu shot along with pneumococcal vaccine (PCV13) and/or DTaP vaccine at the same time might be slightly more likely to have a seizure caused by fever. Ask your doctor for more information. Tell your doctor if a child who is getting flu vaccine has ever had a seizure. Problems that could happen after any injected vaccine:      People sometimes faint after a medical procedure, including vaccination. Sitting or lying down for about 15 minutes can help prevent fainting, and injuries caused by a fall. Tell your doctor if you feel dizzy, or have vision changes or ringing in the ears.  Some people get severe pain in the shoulder and have difficulty moving the arm where a shot was given. This happens very rarely.  Any medication can cause a severe allergic reaction. Such reactions from a vaccine are very rare, estimated at about 1 in a million doses, and would happen within a few minutes to a few hours after the vaccination. As with any medicine, there is a very remote chance of a vaccine causing a serious injury or death. The safety of vaccines is always being monitored. For more information, visit: www.cdc.gov/vaccinesafety/    5. What if there is a serious reaction? What should I look for?  Look for anything that concerns you, such as signs of a severe allergic reaction, very high fever, or unusual behavior. Signs of a severe allergic reaction can include hives, swelling of the face and throat, difficulty breathing, a fast heartbeat, dizziness, and weakness - usually within a few minutes to a few hours after the vaccination. What should I do?  If you think it is a severe allergic reaction or other emergency that cant wait, call 9-1-1 and get the person to the nearest hospital. Otherwise, call your doctor.  Reactions should be reported to the Vaccine Adverse Event Reporting System (VAERS). Your doctor should file this report, or you can do it yourself through  the VAERS web site at www.vaers. [facility].gov, or by calling 9-681.495.9646.     [facility] does not give medical advice. 6. The National Vaccine Injury Compensation Program    The [facility] Vaccine Injury Compensation Program (VICP) is a federal program that was created to compensate people who may have been injured by certain vaccines. Persons who believe they may have been injured by a vaccine can learn about the program and about filing a claim by calling 7-280.637.5318 or visiting the [facility] [employer] website at www.[facility].gov/vaccinecompensation. There is a time limit to file a claim for compensation. 7. How can I learn more?  Ask your healthcare provider. He or she can give you the vaccine package insert or suggest other sources of information.  Call your local or state health department.  Contact the Centers for Disease Control and Prevention (CDC):  - Call 2-502.991.9166 (1-800-CDC-INFO) or  - Visit CDCs website at www.cdc.gov/flu    Vaccine Information Statement   Inactivated Influenza Vaccine   8/7/2015  42 TRISHA [place] [id number]    Department of Health and Human Services  Centers for Disease Control and Prevention    Office Use Only       Trochanteric Bursitis: Exercises  Your Care Instructions  Here are some examples of typical rehabilitation exercises for your condition. Start each exercise slowly. Ease off the exercise if you start to have pain. Your doctor or physical therapist will tell you when you can start these exercises and which ones will work best for you. How to do the exercises  Hamstring wall stretch    1. Lie on your back in a doorway, with your good leg through the open door. 2. Slide your affected leg up the wall to straighten your knee. You should feel a gentle stretch down the back of your leg. 1. Do not arch your back. 2. Do not bend either knee. 3. Keep one heel touching the floor and the other heel touching the wall. Do not point your toes. 3. Hold the stretch for at least 1 minute to begin. Then try to lengthen the time you hold the stretch to as long as 6 minutes.   4. Repeat 2 to 4 times.  5. If you do not have a place to do this exercise in a doorway, there is another way to do it:  6. Lie on your back, and bend the knee of your affected leg. 7. Loop a towel under the ball and toes of that foot, and hold the ends of the towel in your hands. 8. Straighten your knee, and slowly pull back on the towel. You should feel a gentle stretch down the back of your leg. 9. Hold the stretch for 15 to 30 seconds. Or even better, hold the stretch for 1 minute if you can. 10. Repeat 2 to 4 times. Straight-leg raises to the outside    1. Lie on your side, with your affected leg on top. 2. Tighten the front thigh muscles of your top leg to keep your knee straight. 3. Keep your hip and your leg straight in line with the rest of your body, and keep your knee pointing forward. Do not drop your hip back. 4. Lift your top leg straight up toward the ceiling, about 12 inches off the floor. Hold for about 6 seconds, then slowly lower your leg. 5. Repeat 8 to 12 times. Clamshell    1. Lie on your side, with your affected leg on top and your head propped on a pillow. Keep your feet and knees together and your knees bent. 2. Raise your top knee, but keep your feet together. Do not let your hips roll back. Your legs should open up like a clamshell. 3. Hold for 6 seconds. 4. Slowly lower your knee back down. Rest for 10 seconds. 5. Repeat 8 to 12 times. Standing quadriceps stretch    1. If you are not steady on your feet, hold on to a chair, counter, or wall. You can also lie on your stomach or your side to do this exercise. 2. Bend the knee of the leg you want to stretch, and reach behind you to grab the front of your foot or ankle with the hand on the same side. For example, if you are stretching your right leg, use your right hand. 3. Keeping your knees next to each other, pull your foot toward your buttock until you feel a gentle stretch across the front of your hip and down the front of your thigh. Your knee should be pointed directly to the ground, and not out to the side. 4. Hold the stretch for 15 to 30 seconds. 5. Repeat 2 to 4 times. Piriformis stretch    1. Lie on your back with your legs straight. 2. Lift your affected leg and bend your knee. With your opposite hand, reach across your body, and then gently pull your knee toward your opposite shoulder. 3. Hold the stretch for 15 to 30 seconds. 4. Repeat 2 to 4 times. Double knee-to-chest    1. Lie on your back with your knees bent and your feet flat on the floor. You can put a small pillow under your head and neck if it is more comfortable. 2. Bring both knees to your chest.  3. Keep your lower back pressed to the floor. Hold for 15 to 30 seconds. 4. Relax, and lower your knees to the starting position. 5. Repeat 2 to 4 times. Follow-up care is a key part of your treatment and safety. Be sure to make and go to all appointments, and call your doctor if you are having problems. It's also a good idea to know your test results and keep a list of the medicines you take. Where can you learn more? Go to http://smooth-connie.info/. Enter P531 in the search box to learn more about \"Trochanteric Bursitis: Exercises. \"  Current as of: November 29, 2017  Content Version: 11.8  © 8267-3079 [employer]. Care instructions adapted under license by [employer] (which disclaims liability or warranty for this information). If you have questions about a medical condition or this instruction, always ask your healthcare professional. [place] [address] any warranty or liability for your use of this information.

## 2018-10-10 NOTE — PATIENT INSTRUCTIONS
Vaccine Information Statement    Influenza (Flu) Vaccine (Inactivated or Recombinant): What you need to know    Many Vaccine Information Statements are available in Swedish and other languages. See www.immunize.org/vis  Hojas de Información Sobre Vacunas están disponibles en Español y en muchos otros idiomas. Visite www.immunize.org/vis    1. Why get vaccinated? Influenza (flu) is a contagious disease that spreads around the [place] every year, usually between October and May. Flu is caused by influenza viruses, and is spread mainly by coughing, sneezing, and close contact. Anyone can get flu. Flu strikes suddenly and can last several days. Symptoms vary by age, but can include:   fever/chills   sore throat   muscle aches   fatigue   cough   headache    runny or stuffy nose    Flu can also lead to pneumonia and blood infections, and cause diarrhea and seizures in children. If you have a medical condition, such as heart or lung disease, flu can make it worse. Flu is more dangerous for some people. Infants and young children, people 72years of age and older, pregnant women, and people with certain health conditions or a weakened immune system are at greatest risk. Each year thousands of people in the [facility] die from flu, and many more are hospitalized. Flu vaccine can:   keep you from getting flu,   make flu less severe if you do get it, and   keep you from spreading flu to your family and other people. 2. Inactivated and recombinant flu vaccines    A dose of flu vaccine is recommended every flu season. Children 6 months through 6years of age may need two doses during the same flu season. Everyone else needs only one dose each flu season.        Some inactivated flu vaccines contain a very small amount of a mercury-based preservative called thimerosal. Studies have not shown thimerosal in vaccines to be harmful, but flu vaccines that do not contain thimerosal are available. There is no live flu virus in flu shots. They cannot cause the flu. There are many flu viruses, and they are always changing. Each year a new flu vaccine is made to protect against three or four viruses that are likely to cause disease in the upcoming flu season. But even when the vaccine doesnt exactly match these viruses, it may still provide some protection    Flu vaccine cannot prevent:   flu that is caused by a virus not covered by the vaccine, or   illnesses that look like flu but are not. It takes about 2 weeks for protection to develop after vaccination, and protection lasts through the flu season. 3. Some people should not get this vaccine    Tell the person who is giving you the vaccine:     If you have any severe, life-threatening allergies. If you ever had a life-threatening allergic reaction after a dose of flu vaccine, or have a severe allergy to any part of this vaccine, you may be advised not to get vaccinated. Most, but not all, types of flu vaccine contain a small amount of egg protein.  If you ever had Guillain-Barré Syndrome (also called GBS). Some people with a history of GBS should not get this vaccine. This should be discussed with your doctor.  If you are not feeling well. It is usually okay to get flu vaccine when you have a mild illness, but you might be asked to come back when you feel better. 4. Risks of a vaccine reaction    With any medicine, including vaccines, there is a chance of reactions. These are usually mild and go away on their own, but serious reactions are also possible. Most people who get a flu shot do not have any problems with it.      Minor problems following a flu shot include:    soreness, redness, or swelling where the shot was given     hoarseness   sore, red or itchy eyes   cough   fever   aches   headache   itching   fatigue  If these problems occur, they usually begin soon after the shot and last 1 or 2 days. More serious problems following a flu shot can include the following:     There may be a small increased risk of Guillain-Barré Syndrome (GBS) after inactivated flu vaccine. This risk has been estimated at 1 or 2 additional cases per million people vaccinated. This is much lower than the risk of severe complications from flu, which can be prevented by flu vaccine.  Young children who get the flu shot along with pneumococcal vaccine (PCV13) and/or DTaP vaccine at the same time might be slightly more likely to have a seizure caused by fever. Ask your doctor for more information. Tell your doctor if a child who is getting flu vaccine has ever had a seizure. Problems that could happen after any injected vaccine:      People sometimes faint after a medical procedure, including vaccination. Sitting or lying down for about 15 minutes can help prevent fainting, and injuries caused by a fall. Tell your doctor if you feel dizzy, or have vision changes or ringing in the ears.  Some people get severe pain in the shoulder and have difficulty moving the arm where a shot was given. This happens very rarely.  Any medication can cause a severe allergic reaction. Such reactions from a vaccine are very rare, estimated at about 1 in a million doses, and would happen within a few minutes to a few hours after the vaccination. As with any medicine, there is a very remote chance of a vaccine causing a serious injury or death. The safety of vaccines is always being monitored. For more information, visit: www.cdc.gov/vaccinesafety/    5. What if there is a serious reaction? What should I look for?  Look for anything that concerns you, such as signs of a severe allergic reaction, very high fever, or unusual behavior.     Signs of a severe allergic reaction can include hives, swelling of the face and throat, difficulty breathing, a fast heartbeat, dizziness, and weakness - usually within a few minutes to a few hours after the vaccination. What should I do?  If you think it is a severe allergic reaction or other emergency that cant wait, call 9-1-1 and get the person to the nearest hospital. Otherwise, call your doctor.  Reactions should be reported to the Vaccine Adverse Event Reporting System (VAERS). Your doctor should file this report, or you can do it yourself through  the VAERS web site at www.vaers. [facility].gov, or by calling 0-742.184.7948. VAERS does not give medical advice. 6. The National Vaccine Injury Compensation Program    The [facility] Vaccine Injury Compensation Program (VICP) is a federal program that was created to compensate people who may have been injured by certain vaccines. Persons who believe they may have been injured by a vaccine can learn about the program and about filing a claim by calling 0-655.118.7518 or visiting the [employer] website at www.[facility].gov/vaccinecompensation. There is a time limit to file a claim for compensation. 7. How can I learn more?  Ask your healthcare provider. He or she can give you the vaccine package insert or suggest other sources of information.  Call your local or state health department.  Contact the Centers for Disease Control and Prevention (CDC):  - Call 8-138.256.8672 (1-800-CDC-INFO) or  - Visit CDCs website at www.cdc.gov/flu    Vaccine Information Statement   Inactivated Influenza Vaccine   8/7/2015  44 TRISHA Mccullough [id number]    Department of Health and Human Services  Centers for Disease Control and Prevention    Office Use Only       Trochanteric Bursitis: Exercises  Your Care Instructions  Here are some examples of typical rehabilitation exercises for your condition. Start each exercise slowly. Ease off the exercise if you start to have pain. Your doctor or physical therapist will tell you when you can start these exercises and which ones will work best for you.   How to do the exercises  Hamstring wall stretch    1. Lie on your back in a doorway, with your good leg through the open door. 2. Slide your affected leg up the wall to straighten your knee. You should feel a gentle stretch down the back of your leg. 1. Do not arch your back. 2. Do not bend either knee. 3. Keep one heel touching the floor and the other heel touching the wall. Do not point your toes. 3. Hold the stretch for at least 1 minute to begin. Then try to lengthen the time you hold the stretch to as long as 6 minutes. 4. Repeat 2 to 4 times. 5. If you do not have a place to do this exercise in a doorway, there is another way to do it:  6. Lie on your back, and bend the knee of your affected leg. 7. Loop a towel under the ball and toes of that foot, and hold the ends of the towel in your hands. 8. Straighten your knee, and slowly pull back on the towel. You should feel a gentle stretch down the back of your leg. 9. Hold the stretch for 15 to 30 seconds. Or even better, hold the stretch for 1 minute if you can. 10. Repeat 2 to 4 times. Straight-leg raises to the outside    1. Lie on your side, with your affected leg on top. 2. Tighten the front thigh muscles of your top leg to keep your knee straight. 3. Keep your hip and your leg straight in line with the rest of your body, and keep your knee pointing forward. Do not drop your hip back. 4. Lift your top leg straight up toward the ceiling, about 12 inches off the floor. Hold for about 6 seconds, then slowly lower your leg. 5. Repeat 8 to 12 times. Clamshell    1. Lie on your side, with your affected leg on top and your head propped on a pillow. Keep your feet and knees together and your knees bent. 2. Raise your top knee, but keep your feet together. Do not let your hips roll back. Your legs should open up like a clamshell. 3. Hold for 6 seconds. 4. Slowly lower your knee back down. Rest for 10 seconds. 5. Repeat 8 to 12 times. Standing quadriceps stretch    1.  If you are not steady on your feet, hold on to a chair, counter, or wall. You can also lie on your stomach or your side to do this exercise. 2. Bend the knee of the leg you want to stretch, and reach behind you to grab the front of your foot or ankle with the hand on the same side. For example, if you are stretching your right leg, use your right hand. 3. Keeping your knees next to each other, pull your foot toward your buttock until you feel a gentle stretch across the front of your hip and down the front of your thigh. Your knee should be pointed directly to the ground, and not out to the side. 4. Hold the stretch for 15 to 30 seconds. 5. Repeat 2 to 4 times. Piriformis stretch    1. Lie on your back with your legs straight. 2. Lift your affected leg and bend your knee. With your opposite hand, reach across your body, and then gently pull your knee toward your opposite shoulder. 3. Hold the stretch for 15 to 30 seconds. 4. Repeat 2 to 4 times. Double knee-to-chest    1. Lie on your back with your knees bent and your feet flat on the floor. You can put a small pillow under your head and neck if it is more comfortable. 2. Bring both knees to your chest.  3. Keep your lower back pressed to the floor. Hold for 15 to 30 seconds. 4. Relax, and lower your knees to the starting position. 5. Repeat 2 to 4 times. Follow-up care is a key part of your treatment and safety. Be sure to make and go to all appointments, and call your doctor if you are having problems. It's also a good idea to know your test results and keep a list of the medicines you take. Where can you learn more? Go to http://smooth-connie.info/. Enter R463 in the search box to learn more about \"Trochanteric Bursitis: Exercises. \"  Current as of: November 29, 2017  Content Version: 11.8  © 4443-8824 [employer].  Care instructions adapted under license by [employer] (which disclaims liability or warranty for this information). If you have questions about a medical condition or this instruction, always ask your healthcare professional. [place] [address] any warranty or liability for your use of this information.

## 2018-10-10 NOTE — MR AVS SNAPSHOT
[address] [address] [address] [id number] Patient: Fredo Curtis MRN: [id number] INN:[id number] Visit Information Date & Time Provider Department Dept. Phone Encounter #  
 [id number]  2:32 PM Patrick Foley Jules [phone] [id number] Upcoming Health Maintenance Date Due Influenza Age 5 to Adult 8/1/2018 DTaP/Tdap/Td series (2 - Td) 8/31/2025 Allergies as of 10/10/2018  Review Complete On: 26/92/3188 By: Amairani Snyder MD  
  
 Severity Noted Reaction Type Reactions Latex  11/20/2017    Rash Erythromycin  01/21/2015    Nausea and Vomiting Vancomycin  12/02/2013    Swelling Current Immunizations  Reviewed on 10/3/2017 Name Date Influenza Vaccine (Quad) PF  Incomplete, 10/3/2017 11:00 AM, 11/2/2016 Tdap 8/31/2015 12:12 PM  
  
 Not reviewed this visit You Were Diagnosed With   
  
 Codes Comments Pain of left hip joint    -  Primary ICD-10-CM: M25.552 ICD-9-CM: 719.45 Encounter for immunization     ICD-10-CM: S30 ICD-9-CM: V03.89 Chronic left-sided low back pain, with sciatica presence unspecified     ICD-10-CM: M54.5, G89.29 ICD-9-CM: 724.2, 338.29 Vitals BP Pulse Temp Resp Height(growth percentile) Weight(growth percentile) 100/60 (BP 1 Location: Right arm, BP Patient Position: Sitting) 81 97.9 °F (36.6 °C) (Oral) 20 5' 3\" (1.6 m) 129 lb 6.4 oz (58.7 kg) LMP SpO2 BMI OB Status Smoking Status 01/09/2010 98% 22.92 kg/m2 Hysterectomy Never Smoker Vitals History BMI and BSA Data Body Mass Index Body Surface Area  
 22.92 kg/m 2 1.62 m 2 Preferred Pharmacy Pharmacy Name Phone [id number] [address] [phone] Your Updated Medication List  
  
   
This list is accurate as of 10/10/18  4:32 PM.  Always use your most recent med list.  
  
  
  
  
 butalbital-acetaminophen-caffeine -40 mg per tablet Commonly known as:  FIORICET, ESGIC  
TAKE 1 TABLET BY MOUTH EVERY 6 HOURS AS NEEDED FOR HEADACHE. MAX DAILY: 4 TABS  
  
 cetirizine 10 mg tablet Commonly known as:  ZyrTEC Take 1 Tab by mouth daily. cyclobenzaprine 10 mg tablet Commonly known as:  FLEXERIL  
TAKE 1 TABLET BY MOUTH EACH NIGHT AT BEDTIME  
  
 dicyclomine 10 mg capsule Commonly known as:  BENTYL Take 1 Cap by mouth three (3) times daily. Indications: Irritable Bowel Syndrome FLONASE ALLERGY RELIEF 50 mcg/actuation nasal spray Generic drug:  fluticasone 2 Sprays by Both Nostrils route daily. gabapentin 800 mg tablet Commonly known as:  NEURONTIN  
TAKE 1 TABLET BY MOUTH THREE TIMES DAILY  
  
 levothyroxine 25 mcg tablet Commonly known as:  SYNTHROID Take 1 Tab by mouth Daily (before breakfast). Indications: hypothyroidism  
  
 nortriptyline 25 mg capsule Commonly known as:  PAMELOR  
TAKE 1-2 CAPSULES BY MOUTH AT BEDTIME  
  
 sertraline 100 mg tablet Commonly known as:  ZOLOFT Take 1 Tab by mouth daily. Indications: ANXIETY WITH DEPRESSION  
  
 SUMAtriptan 100 mg tablet Commonly known as:  IMITREX  
TAKE 1 TABLET BY MOUTH ONCE AS NEEDED FOR MIGRAINE FOR UP TO 9 DOSES  
  
 topiramate 100 mg tablet Commonly known as:  TOPAMAX TAKE 1 TAB BY MOUTH TWO (2) TIMES A DAY. traMADol 50 mg tablet Commonly known as:  ULTRAM  
TAKE 1 TABLET EVERY 8 HOURS AS NEEDED FOR PAIN. MAX DAILY AMOUNT 3 TABS  
  
 VITAMIN D3 1,000 unit Cap Generic drug:  cholecalciferol Take 1 Cap by mouth daily. We Performed the Following INFLUENZA VIRUS VAC QUAD,SPLIT,PRESV FREE SYRINGE IM [id number] CPT(R)] SC IMMUNIZ ADMIN,1 SINGLE/COMB VAC/TOXOID [id number] CPT(R)] To-Do List   
 10/10/2018 Imaging:  XR HIP LT W OR WO PELV 2-3 VWS Patient Instructions Vaccine Information Statement Influenza (Flu) Vaccine (Inactivated or Recombinant): What you need to know Many Vaccine Information Statements are available in Maltese and other languages. See www.immunize.org/vis Hojas de Información Sobre Vacunas están disponibles en Español y en muchos otros idiomas. Visite www.immunize.org/vis 1. Why get vaccinated? Influenza (flu) is a contagious disease that spreads around the [facility] every year, usually between October and May. Flu is caused by influenza viruses, and is spread mainly by coughing, sneezing, and close contact. Anyone can get flu. Flu strikes suddenly and can last several days. Symptoms vary by age, but can include: 
 fever/chills  sore throat  muscle aches  fatigue  cough  headache  runny or stuffy nose Flu can also lead to pneumonia and blood infections, and cause diarrhea and seizures in children. If you have a medical condition, such as heart or lung disease, flu can make it worse. Flu is more dangerous for some people. Infants and young children, people 72years of age and older, pregnant women, and people with certain health conditions or a weakened immune system are at greatest risk. Each year thousands of people in the [facility] die from flu, and many more are hospitalized. Flu vaccine can: 
 keep you from getting flu, 
 make flu less severe if you do get it, and 
 keep you from spreading flu to your family and other people. 2. Inactivated and recombinant flu vaccines A dose of flu vaccine is recommended every flu season. Children 6 months through 6years of age may need two doses during the same flu season. Everyone else needs only one dose each flu season. Some inactivated flu vaccines contain a very small amount of a mercury-based preservative called thimerosal. Studies have not shown thimerosal in vaccines to be harmful, but flu vaccines that do not contain thimerosal are available. There is no live flu virus in flu shots. They cannot cause the flu. There are many flu viruses, and they are always changing. Each year a new flu vaccine is made to protect against three or four viruses that are likely to cause disease in the upcoming flu season. But even when the vaccine doesnt exactly match these viruses, it may still provide some protection Flu vaccine cannot prevent: 
 flu that is caused by a virus not covered by the vaccine, or 
 illnesses that look like flu but are not. It takes about 2 weeks for protection to develop after vaccination, and protection lasts through the flu season. 3. Some people should not get this vaccine Tell the person who is giving you the vaccine:  If you have any severe, life-threatening allergies. If you ever had a life-threatening allergic reaction after a dose of flu vaccine, or have a severe allergy to any part of this vaccine, you may be advised not to get vaccinated. Most, but not all, types of flu vaccine contain a small amount of egg protein.  If you ever had Guillain-Barré Syndrome (also called GBS). Some people with a history of GBS should not get this vaccine. This should be discussed with your doctor.  If you are not feeling well. It is usually okay to get flu vaccine when you have a mild illness, but you might be asked to come back when you feel better. 4. Risks of a vaccine reaction With any medicine, including vaccines, there is a chance of reactions. These are usually mild and go away on their own, but serious reactions are also possible. Most people who get a flu shot do not have any problems with it. Minor problems following a flu shot include:  
 soreness, redness, or swelling where the shot was given  hoarseness  sore, red or itchy eyes  cough  fever  aches  headache  itching  fatigue If these problems occur, they usually begin soon after the shot and last 1 or 2 days. More serious problems following a flu shot can include the following:  There may be a small increased risk of Guillain-Barré Syndrome (GBS) after inactivated flu vaccine. This risk has been estimated at 1 or 2 additional cases per million people vaccinated. This is much lower than the risk of severe complications from flu, which can be prevented by flu vaccine.  Young children who get the flu shot along with pneumococcal vaccine (PCV13) and/or DTaP vaccine at the same time might be slightly more likely to have a seizure caused by fever. Ask your doctor for more information. Tell your doctor if a child who is getting flu vaccine has ever had a seizure. Problems that could happen after any injected vaccine:  People sometimes faint after a medical procedure, including vaccination. Sitting or lying down for about 15 minutes can help prevent fainting, and injuries caused by a fall. Tell your doctor if you feel dizzy, or have vision changes or ringing in the ears.  Some people get severe pain in the shoulder and have difficulty moving the arm where a shot was given. This happens very rarely.  Any medication can cause a severe allergic reaction. Such reactions from a vaccine are very rare, estimated at about 1 in a million doses, and would happen within a few minutes to a few hours after the vaccination. As with any medicine, there is a very remote chance of a vaccine causing a serious injury or death. The safety of vaccines is always being monitored. For more information, visit: www.cdc.gov/vaccinesafety/ 
 
5. What if there is a serious reaction? What should I look for?  Look for anything that concerns you, such as signs of a severe allergic reaction, very high fever, or unusual behavior.  
 
Signs of a severe allergic reaction can include hives, swelling of the face and throat, difficulty breathing, a fast heartbeat, dizziness, and weakness  usually within a few minutes to a few hours after the vaccination. What should I do?  If you think it is a severe allergic reaction or other emergency that cant wait, call 9-1-1 and get the person to the nearest hospital. Otherwise, call your doctor.  Reactions should be reported to the Vaccine Adverse Event Reporting System (VAERS). Your doctor should file this report, or you can do it yourself through  the VAERS web site at www.vaers. [facility].gov, or by calling 7-867.804.7411. VAERS does not give medical advice. 6. The National Vaccine Injury Compensation Program 
 
The [facility] Vaccine Injury Compensation Program (VICP) is a federal program that was created to compensate people who may have been injured by certain vaccines. Persons who believe they may have been injured by a vaccine can learn about the program and about filing a claim by calling 6-562.342.6297 or visiting the [address] website at www.[facility].gov/vaccinecompensation. There is a time limit to file a claim for compensation. 7. How can I learn more?  Ask your healthcare provider. He or she can give you the vaccine package insert or suggest other sources of information.  Call your local or state health department.  Contact the Centers for Disease Control and Prevention (CDC): 
- Call 2-583.274.7611 (1-800-CDC-INFO) or 
- Visit CDCs website at www.cdc.gov/flu Vaccine Information Statement Inactivated Influenza Vaccine 8/7/2015 
42 TRISHA Thomson [id number] Department of [facility] and [employer] Smart[employer] Centers for Disease Control and Prevention Office Use Only Trochanteric Bursitis: Exercises Your Care Instructions Here are some examples of typical rehabilitation exercises for your condition. Start each exercise slowly. Ease off the exercise if you start to have pain. Your doctor or physical therapist will tell you when you can start these exercises and which ones will work best for you. How to do the exercises Hamstring wall stretch 1. Lie on your back in a doorway, with your good leg through the open door. 2. Slide your affected leg up the wall to straighten your knee. You should feel a gentle stretch down the back of your leg. 1. Do not arch your back. 2. Do not bend either knee. 3. Keep one heel touching the floor and the other heel touching the wall. Do not point your toes. 3. Hold the stretch for at least 1 minute to begin. Then try to lengthen the time you hold the stretch to as long as 6 minutes. 4. Repeat 2 to 4 times. 5. If you do not have a place to do this exercise in a doorway, there is another way to do it: 6. Lie on your back, and bend the knee of your affected leg. 7. Loop a towel under the ball and toes of that foot, and hold the ends of the towel in your hands. 8. Straighten your knee, and slowly pull back on the towel. You should feel a gentle stretch down the back of your leg. 9. Hold the stretch for 15 to 30 seconds. Or even better, hold the stretch for 1 minute if you can. 10. Repeat 2 to 4 times. Straight-leg raises to the outside 1. Lie on your side, with your affected leg on top. 2. Tighten the front thigh muscles of your top leg to keep your knee straight. 3. Keep your hip and your leg straight in line with the rest of your body, and keep your knee pointing forward. Do not drop your hip back. 4. Lift your top leg straight up toward the ceiling, about 12 inches off the floor. Hold for about 6 seconds, then slowly lower your leg. 5. Repeat 8 to 12 times. Clamshell 1. Lie on your side, with your affected leg on top and your head propped on a pillow. Keep your feet and knees together and your knees bent. 2. Raise your top knee, but keep your feet together. Do not let your hips roll back. Your legs should open up like a clamshell. 3. Hold for 6 seconds. 4. Slowly lower your knee back down. Rest for 10 seconds. 5. Repeat 8 to 12 times. Standing quadriceps stretch 1. If you are not steady on your feet, hold on to a chair, counter, or wall. You can also lie on your stomach or your side to do this exercise. 2. Bend the knee of the leg you want to stretch, and reach behind you to grab the front of your foot or ankle with the hand on the same side. For example, if you are stretching your right leg, use your right hand. 3. Keeping your knees next to each other, pull your foot toward your buttock until you feel a gentle stretch across the front of your hip and down the front of your thigh. Your knee should be pointed directly to the ground, and not out to the side. 4. Hold the stretch for 15 to 30 seconds. 5. Repeat 2 to 4 times. Piriformis stretch 1. Lie on your back with your legs straight. 2. Lift your affected leg and bend your knee. With your opposite hand, reach across your body, and then gently pull your knee toward your opposite shoulder. 3. Hold the stretch for 15 to 30 seconds. 4. Repeat 2 to 4 times. Double knee-to-chest 
 
1. Lie on your back with your knees bent and your feet flat on the floor. You can put a small pillow under your head and neck if it is more comfortable. 2. Bring both knees to your chest. 
3. Keep your lower back pressed to the floor. Hold for 15 to 30 seconds. 4. Relax, and lower your knees to the starting position. 5. Repeat 2 to 4 times. Follow-up care is a key part of your treatment and safety. Be sure to make and go to all appointments, and call your doctor if you are having problems. It's also a good idea to know your test results and keep a list of the medicines you take. Where can you learn more? Go to http://smooth-connie.info/. Enter D230 in the search box to learn more about \"Trochanteric Bursitis: Exercises. \" Current as of: November 29, 2017 Content Version: 11.8 © 1294-0789 Healthwise, Incorporated.  Care instructions adapted under license by [address] (which disclaims liability or warranty for this information). If you have questions about a medical condition or this instruction, always ask your healthcare professional. [address] any warranty or liability for your use of this information. Introducing [facility] & HEALTH SERVICES! [facility] introduces [employer] patient portal. Now you can access parts of your medical record, email your doctor's office, and request medication refills online. 1. In your internet browser, go to [email or web address] 2. Click on the First Time User? Click Here link in the Sign In box. You will see the New Member Sign Up page. 3. Enter your [employer] Access Code exactly as it appears below. You will not need to use this code after youve completed the sign-up process. If you do not sign up before the expiration date, you must request a new code. · [employer] Access Code: [email or web address] Expires: 11/13/2018 12:01 PM 
 
4. Enter the last four digits of your Social Security Number (xxxx) and Date of Birth (mm/dd/yyyy) as indicated and click Submit. You will be taken to the next sign-up page. 5. Create a [employer] ID. This will be your [employer] login ID and cannot be changed, so think of one that is secure and easy to remember. 6. Create a [employer] password. You can change your password at any time. 7. Enter your Password Reset Question and Answer. This can be used at a later time if you forget your password. 8. Enter your e-mail address. You will receive e-mail notification when new information is available in [address] 9. Click Sign Up. You can now view and download portions of your medical record. 10. Click the Download Summary menu link to download a portable copy of your medical information. If you have questions, please visit the Frequently Asked Questions section of the [employer] website.  Remember, [employer] is NOT to be used for urgent needs. For medical emergencies, dial 911. Now available from your iPhone and Android! Please provide this summary of care documentation to your next provider. Your primary care clinician is listed as Nicole Moreno. If you have any questions after today's visit, please call [phone].

## 2018-10-10 NOTE — PROGRESS NOTES
Identified pt with two pt identifiers(name and ). Chief Complaint   Patient presents with    Hip Pain     left hip has hurt off and on for 3 months    Irritable Bowel Syndrome     she said the spasms are better     Immunization/Injection     flu        Health Maintenance Due   Topic    Influenza Age 5 to Adult        Wt Readings from Last 3 Encounters:   10/10/18 129 lb 6.4 oz (58.7 kg)   08/15/18 134 lb 3.2 oz (60.9 kg)   18 136 lb 3.2 oz (61.8 kg)     Temp Readings from Last 3 Encounters:   10/10/18 97.9 °F (36.6 °C) (Oral)   08/15/18 98 °F (36.7 °C) (Oral)   18 98 °F (36.7 °C) (Oral)     BP Readings from Last 3 Encounters:   10/10/18 100/60   08/15/18 (!) 80/60   18 96/64     Pulse Readings from Last 3 Encounters:   10/10/18 81   08/15/18 79   18 86         Learning Assessment:  :     Learning Assessment 2014   PRIMARY LEARNER Patient Patient   HIGHEST LEVEL OF EDUCATION - PRIMARY LEARNER  DID NOT GRADUATE HIGH SCHOOL DID NOT GRADUATE HIGH SCHOOL   BARRIERS PRIMARY LEARNER - NONE   CO-LEARNER CAREGIVER - No   PRIMARY LANGUAGE ENGLISH ENGLISH   LEARNER PREFERENCE PRIMARY DEMONSTRATION DEMONSTRATION   ANSWERED BY patient patient    RELATIONSHIP SELF SELF       Depression Screening:  :     PHQ over the last two weeks 2018   PHQ Not Done Active Diagnosis of Depression or Bipolar Disorder   Little interest or pleasure in doing things -   Feeling down, depressed, irritable, or hopeless -   Total Score PHQ 2 -       Fall Risk Assessment:  :     Fall Risk Assessment, last 12 mths 3/6/2017   Able to walk? Yes   Fall in past 12 months? No       Abuse Screening:  :     Abuse Screening Questionnaire 2018 3/6/2017 1/15/2016 2014   Do you ever feel afraid of your partner? N N N N   Are you in a relationship with someone who physically or mentally threatens you? N N N N   Is it safe for you to go home?  Gill Livers       Coordination of Care Questionnaire:  :     1) Have you been to an emergency room, urgent care clinic since your last visit? no   Hospitalized since your last visit? no             2) Have you seen or consulted any other health care providers outside of [address] [place] [address] since your last visit? no  (Include any pap smears or colon screenings in this section.)    3) Do you have an Advance Directive on file? no  Are you interested in receiving information about Advance Directives? no    Reviewed record in preparation for visit and have obtained necessary documentation. Medication reconciliation up to date and corrected with patient at this time. Albertina Carlin is a 57 y.o. female who presents for routine immunizations. She denies any symptoms , reactions or allergies that would exclude them from being immunized today. Risks and adverse reactions were discussed and the VIS was given to them. All questions were addressed. She was observed for 10 min post injection. There were no reactions observed.     Betty Sr LPN

## 2018-10-11 ENCOUNTER — TELEPHONE (OUTPATIENT)
Dept: FAMILY MEDICINE CLINIC | Age: 48
End: 2018-10-11

## 2018-10-11 ENCOUNTER — HOSPITAL ENCOUNTER (OUTPATIENT)
Dept: GENERAL RADIOLOGY | Age: 48
Discharge: HOME | End: 2018-10-11
Attending: FAMILY MEDICINE
Payer: SUBSIDIZED

## 2018-10-11 DIAGNOSIS — M25.552 PAIN OF LEFT HIP JOINT: ICD-10-CM

## 2018-10-11 PROCEDURE — 73502 X-RAY EXAM HIP UNI 2-3 VIEWS: CPT

## 2018-10-11 NOTE — TELEPHONE ENCOUNTER
Call pt to advise her hip XR was normal. I think hip pain is bursitis. I gave her exercises to do. If not improve, let me know to get referral to [facility]. She can also take Diclofenac for it.

## 2018-10-19 ENCOUNTER — HOSPITAL ENCOUNTER (EMERGENCY)
Age: 48
Discharge: HOME | End: 2018-10-19
Attending: EMERGENCY MEDICINE
Payer: SUBSIDIZED

## 2018-10-19 VITALS
WEIGHT: 130.07 LBS | BODY MASS INDEX: 23.05 KG/M2 | RESPIRATION RATE: 15 BRPM | HEIGHT: 63 IN | HEART RATE: 88 BPM | DIASTOLIC BLOOD PRESSURE: 72 MMHG | OXYGEN SATURATION: 99 % | TEMPERATURE: 98.1 F | SYSTOLIC BLOOD PRESSURE: 103 MMHG

## 2018-10-19 DIAGNOSIS — M54.42 ACUTE LEFT-SIDED LOW BACK PAIN WITH LEFT-SIDED SCIATICA: Primary | ICD-10-CM

## 2018-10-19 PROCEDURE — 74011000250 HC RX REV CODE- 250: Performed by: EMERGENCY MEDICINE

## 2018-10-19 PROCEDURE — 75810000123 HC INJ'S ANES/STEROID AGT PERIPH NERVE

## 2018-10-19 PROCEDURE — 99282 EMERGENCY DEPT VISIT SF MDM: CPT

## 2018-10-19 RX ORDER — NAPROXEN 500 MG/1
500 TABLET ORAL 2 TIMES DAILY WITH MEALS
Qty: 10 TAB | Refills: 0 | Status: SHIPPED | OUTPATIENT
Start: 2018-10-19 | End: 2018-10-24

## 2018-10-19 RX ORDER — ACETAMINOPHEN 500 MG
1000 TABLET ORAL
Qty: 20 TAB | Refills: 0 | Status: SHIPPED | OUTPATIENT
Start: 2018-10-19 | End: 2019-05-16 | Stop reason: ALTCHOICE

## 2018-10-19 RX ORDER — BUPIVACAINE HYDROCHLORIDE 5 MG/ML
10 INJECTION, SOLUTION EPIDURAL; INTRACAUDAL
Status: COMPLETED | OUTPATIENT
Start: 2018-10-19 | End: 2018-10-19

## 2018-10-19 RX ADMIN — BUPIVACAINE HYDROCHLORIDE 50 MG: 5 INJECTION, SOLUTION EPIDURAL; INTRACAUDAL; PERINEURAL at 13:55

## 2018-10-19 NOTE — ED TRIAGE NOTES
Pt ambulates to treatment area she states that for the past several months she has had lower abdominal pain with intermittent diarrhea and nausea. Then for the past 4-6 weeks she has had left hip and back pain that she saw her PCP a week ago for they sent her for xrays. The results of the xray was that she had a bursitis or something going on but 2 days ago she began with shooting pains down her left leg. She is taking Ibuprofen and flexeril but it is not helping.

## 2018-10-19 NOTE — ED PROVIDER NOTES
Hip Injury This is a chronic problem. Episode onset: 6-8 weeks ago. The problem occurs constantly. The problem has not changed since onset. The pain is present in the left hip (lateral and posterior). The quality of the pain is described as sharp. The pain is moderate. Associated symptoms include back pain. Associated symptoms comments: Shooting down left leg. The symptoms are aggravated by standing and activity. Treatments tried: motrin 800 mg bid. The treatment provided no relief. There has been no history of extremity trauma. Abdominal Pain This is a chronic problem. Episode onset: multiple months ago. The problem has not changed since onset. The pain is located in the LLQ. Associated symptoms include diarrhea (occasional, improved with diet) and back pain. Pertinent negatives include no fever, no hematochezia, no melena, no nausea, no vomiting, no dysuria, no frequency and no hematuria. Past medical history comments: IBS, adhesions from prior surgeries. Past Medical History:  
Diagnosis Date  Acquired hypothyroidism 5/20/2018  Cervicalgia  Chronic neck pain 4/3/2013  Hypercholesteremia 5/18/2018  Migraine with aura, without mention of intractable migraine without mention of status migrainosus  Moderate episode of recurrent major depressive disorder ([facility] 11/2/2016 Past Surgical History:  
Procedure Laterality Date  HX ABDOMINAL LAPAROSCOPY Adhesions found  HX APPENDECTOMY  HX CERVICAL FUSION  2006  HX HYSTERECTOMY  01/2010 Family History:  
Problem Relation Age of Onset  Hypertension Father  Breast Cancer Other   
     not sure of age Social History Socioeconomic History  Marital status:  Spouse name: Not on file  Number of children: Not on file  Years of education: Not on file  Highest education level: Not on file Social Needs  Financial resource strain: Not on file  Food insecurity - worry: Not on file  Food insecurity - inability: Not on file  Transportation needs - medical: Not on file  Transportation needs - non-medical: Not on file Occupational History  Not on file Tobacco Use  Smoking status: Never Smoker  Smokeless tobacco: Never Used Substance and Sexual Activity  Alcohol use: Yes Alcohol/week: 0.0 oz  
  Comment: rarely  Drug use: Yes Types: Marijuana  Sexual activity: Yes  
  Partners: Male Birth control/protection: Surgical  
Other Topics Concern  Not on file Social History Narrative  Not on file ALLERGIES: Latex; Erythromycin; and Vancomycin Review of Systems Constitutional: Negative for fever. Gastrointestinal: Positive for abdominal pain and diarrhea (occasional, improved with diet). Negative for hematochezia, melena, nausea and vomiting. Genitourinary: Negative for dysuria, frequency and hematuria. Musculoskeletal: Positive for back pain. All other systems reviewed and are negative. Vitals:  
 10/19/18 1321 BP: 113/74 Pulse: 94 Resp: 16 Temp: 98.1 °F (36.7 °C) SpO2: 98% Weight: 59 kg (130 lb 1.1 oz) Height: 5' 3\" (1.6 m) Physical Exam  
Constitutional: She appears well-developed. No distress. HENT:  
Head: Normocephalic and atraumatic. Mouth/Throat: Oropharynx is clear and moist and mucous membranes are normal.  
Eyes: Conjunctivae, EOM and lids are normal.  
Neck: Neck supple. Cardiovascular: Normal rate and regular rhythm. Pulmonary/Chest: Effort normal. No respiratory distress. Abdominal: Normal appearance. She exhibits no distension. There is no tenderness. There is no rigidity, no rebound and no guarding. Musculoskeletal: She exhibits no deformity. Lumbar back: She exhibits tenderness (superior to left iliac crest with reproducible symptoms). Back: 
 
Neurological: She is alert. No cranial nerve deficit. Skin: Skin is warm and dry. No rash noted. Psychiatric: Her behavior is normal.  
Nursing note and vitals reviewed. MDM Number of Diagnoses or Management Options Acute left-sided low back pain with left-sided sciatica:  
 
  
 
57 y.o. female presents with back pain in lumbar area since 6-8 weeks ago with signs of radicular pain. No acute traumatic onset. No red flag symptoms of fever, weight loss, saddle anesthesia, weakness, fecal/urinary incontinence or urinary retention. Offered symptomatic treatment here. Suspect MSK etiology. No indication for imaging emergently. Patient was recommended to take short course of scheduled NSAIDs and engage in early mobility as definitive treatment. Secondary complaint of chronic lower abdominal cramping and diarrhea which has improved with diet changes that patient wanted to relay but is likely not a related complaint Return precautions discussed for worsening or new concerning symptoms. Procedures Procedure Note: Trigger Point Injection for Myofascial pain Performed by Dr. Momo Guadarrama Indication: muscle/myofascial pain Muscle body and tendon sheath of the left lumbar paraspinal muscle(s) were injected with 0.5% bupivacaine under sterile technique for release of muscle spasm/pain. Patient tolerated well with immediate improvement of symptoms and no immediate complications following procedure. CPT Code:  
 
1 or 2 muscle bodies: 00668

## 2018-10-19 NOTE — ED NOTES
The patient was discharged home by Dr Maricel Lucas in stable condition. The patient is alert and oriented, in no respiratory distress and discharge vital signs obtained. The patient's diagnosis, condition and treatment were explained. The patient expressed understanding. A discharge plan has been developed. A  was not involved in the process. Aftercare instructions were given. Pt ambulatory out of the ED.

## 2018-10-19 NOTE — DISCHARGE INSTRUCTIONS

## 2018-10-28 DIAGNOSIS — R51.9 HEADACHE, UNSPECIFIED HEADACHE TYPE: ICD-10-CM

## 2018-10-29 RX ORDER — GABAPENTIN 800 MG/1
TABLET ORAL
Qty: 90 TAB | Refills: 4 | Status: SHIPPED | OUTPATIENT
Start: 2018-10-29 | End: 2019-04-01 | Stop reason: SDUPTHER

## 2018-10-29 RX ORDER — TOPIRAMATE 100 MG/1
TABLET, FILM COATED ORAL
Qty: 60 TAB | Refills: 3 | Status: SHIPPED | OUTPATIENT
Start: 2018-10-29 | End: 2019-03-01 | Stop reason: SDUPTHER

## 2018-11-10 DIAGNOSIS — E03.9 ACQUIRED HYPOTHYROIDISM: ICD-10-CM

## 2018-11-10 RX ORDER — LEVOTHYROXINE SODIUM 25 UG/1
TABLET ORAL
Qty: 90 TAB | Refills: 1 | Status: SHIPPED | OUTPATIENT
Start: 2018-11-10 | End: 2019-05-04 | Stop reason: SDUPTHER

## 2018-12-12 DIAGNOSIS — F41.9 ANXIETY: ICD-10-CM

## 2018-12-12 RX ORDER — SERTRALINE HYDROCHLORIDE 100 MG/1
100 TABLET, FILM COATED ORAL DAILY
Qty: 30 TAB | Refills: 3 | Status: SHIPPED | OUTPATIENT
Start: 2018-12-12 | End: 2019-04-01 | Stop reason: SDUPTHER

## 2019-01-06 DIAGNOSIS — R19.7 DIARRHEA, UNSPECIFIED TYPE: ICD-10-CM

## 2019-01-07 RX ORDER — DICYCLOMINE HYDROCHLORIDE 10 MG/1
10 CAPSULE ORAL 3 TIMES DAILY
Qty: 30 CAP | Refills: 0 | Status: SHIPPED | OUTPATIENT
Start: 2019-01-07 | End: 2019-05-16 | Stop reason: ALTCHOICE

## 2019-03-01 DIAGNOSIS — R51.9 HEADACHE, UNSPECIFIED HEADACHE TYPE: ICD-10-CM

## 2019-03-01 RX ORDER — TOPIRAMATE 100 MG/1
TABLET, FILM COATED ORAL
Qty: 60 TAB | Refills: 3 | Status: SHIPPED | OUTPATIENT
Start: 2019-03-01 | End: 2019-07-08 | Stop reason: SDUPTHER

## 2019-03-07 ENCOUNTER — HOSPITAL ENCOUNTER (OUTPATIENT)
Dept: GENERAL RADIOLOGY | Age: 49
Discharge: HOME | End: 2019-03-07
Attending: NURSE PRACTITIONER
Payer: MEDICAID

## 2019-03-07 ENCOUNTER — OFFICE VISIT (OUTPATIENT)
Dept: FAMILY MEDICINE CLINIC | Age: 49
End: 2019-03-07

## 2019-03-07 VITALS
HEIGHT: 63 IN | TEMPERATURE: 97 F | BODY MASS INDEX: 22.5 KG/M2 | WEIGHT: 127 LBS | RESPIRATION RATE: 18 BRPM | HEART RATE: 90 BPM | SYSTOLIC BLOOD PRESSURE: 95 MMHG | OXYGEN SATURATION: 100 % | DIASTOLIC BLOOD PRESSURE: 72 MMHG

## 2019-03-07 DIAGNOSIS — M54.2 CHRONIC NECK PAIN: ICD-10-CM

## 2019-03-07 DIAGNOSIS — G89.29 CHRONIC NECK PAIN: ICD-10-CM

## 2019-03-07 DIAGNOSIS — R51.9 HEADACHE, UNSPECIFIED HEADACHE TYPE: ICD-10-CM

## 2019-03-07 DIAGNOSIS — M54.2 NECK PAIN: ICD-10-CM

## 2019-03-07 DIAGNOSIS — E03.9 ACQUIRED HYPOTHYROIDISM: ICD-10-CM

## 2019-03-07 DIAGNOSIS — M54.2 NECK PAIN: Primary | ICD-10-CM

## 2019-03-07 DIAGNOSIS — M62.830 SPASM OF MUSCLE, BACK: ICD-10-CM

## 2019-03-07 PROCEDURE — 72050 X-RAY EXAM NECK SPINE 4/5VWS: CPT

## 2019-03-07 RX ORDER — BUTALBITAL, ACETAMINOPHEN AND CAFFEINE 50; 325; 40 MG/1; MG/1; MG/1
TABLET ORAL
Qty: 30 TAB | Refills: 2 | Status: SHIPPED | OUTPATIENT
Start: 2019-03-07 | End: 2019-05-16 | Stop reason: SDUPTHER

## 2019-03-07 RX ORDER — CYCLOBENZAPRINE HCL 10 MG
TABLET ORAL
Qty: 14 TAB | Refills: 0 | Status: SHIPPED | OUTPATIENT
Start: 2019-03-07 | End: 2019-05-16 | Stop reason: ALTCHOICE

## 2019-03-07 NOTE — PROGRESS NOTES
HISTORY OF PRESENT ILLNESS  Prakash Lam is a 57 y.o. female. HPI   Pt presents with \"neck pain, and fatigue\"    Pt states that she has been dealing with chronic neck pain for years. Pt did not have insurance for a number of years, and has recently gotten insurance again. She is interested in getting another x-ray, as it has been years since she has had one, and her neck pain seems to be flaring up more so then usual.  Pt states that she is usually able to control her pain with her gabapentin, but starting yesterday,her neck became painful and tight feeling. Pt has not done PT in a number of years, due to lack of insurance. In addition, patient states that she is fatigued all the time. She is wanting to get her thyroid checked, but if this returns and is normal,she is wondering if it could be her depression? Pt was previously on Wellbutrin but stopped this due to insurance loss. Pt is wondering if it would be a good idea to re-start this at this time. Review of Systems   Constitutional: Positive for malaise/fatigue. Negative for fever. HENT: Negative for congestion. Respiratory: Negative for cough. Gastrointestinal: Negative for diarrhea and vomiting. Musculoskeletal: Positive for neck pain. Physical Exam   Constitutional: She is oriented to person, place, and time. She appears well-developed and well-nourished. HENT:   Head: Normocephalic and atraumatic. Neck: Normal range of motion. Neck supple. No thyromegaly present. Cardiovascular: Normal rate, regular rhythm and normal heart sounds. Pulmonary/Chest: Effort normal and breath sounds normal.   Neurological: She is alert and oriented to person, place, and time. Skin: Skin is warm and dry. Psychiatric: She has a normal mood and affect. Her behavior is normal.       ASSESSMENT and PLAN    ICD-10-CM ICD-9-CM    1. Neck pain M54.2 723.1 XR SPINE CERV 4 OR 5 V   2.  Headache, unspecified headache type R51 784.0 butalbital-acetaminophen-caffeine (FIORICET, ESGIC) -40 mg per tablet   3. Acquired hypothyroidism E03.9 244.9 THYROID CASCADE PROFILE   4. Chronic neck pain M54.2 723.1     G89.29 338.29    5. Spasm of muscle, back M62.830 724.8 cyclobenzaprine (FLEXERIL) 10 mg tablet     Informed patient that we will notify her when x-ray and labs return  Educated about trying Flexeril, for neck pain  Should not drive while taking this medication    Order for PT faxed to [facility], and informed patient to call and make an appointment ASAP    Pt informed to return to office with worsening of symptoms, or PRN with any questions or concerns. Pt verbalizes understanding of plan of care and denies further questions or concerns at this time.

## 2019-03-07 NOTE — PROGRESS NOTES
Identified pt with two pt identifiers(name and ). Chief Complaint   Patient presents with    Neck Pain     Chronic. Had a bad flare up this AM    Labs     Patient would like to have Thyriod checked. There are no preventive care reminders to display for this patient. Wt Readings from Last 3 Encounters:   19 127 lb (57.6 kg)   10/19/18 130 lb 1.1 oz (59 kg)   10/10/18 129 lb 6.4 oz (58.7 kg)     Temp Readings from Last 3 Encounters:   19 97 °F (36.1 °C) (Oral)   10/19/18 98.1 °F (36.7 °C)   10/10/18 97.9 °F (36.6 °C) (Oral)     BP Readings from Last 3 Encounters:   19 95/72   10/19/18 103/72   10/10/18 100/60     Pulse Readings from Last 3 Encounters:   19 90   10/19/18 88   10/10/18 81         Learning Assessment:  :     Learning Assessment 2014   PRIMARY LEARNER Patient Patient   HIGHEST LEVEL OF EDUCATION - PRIMARY LEARNER  DID NOT GRADUATE HIGH SCHOOL DID NOT GRADUATE HIGH SCHOOL   BARRIERS PRIMARY LEARNER - NONE   CO-LEARNER CAREGIVER - No   PRIMARY LANGUAGE ENGLISH ENGLISH   LEARNER PREFERENCE PRIMARY DEMONSTRATION DEMONSTRATION   ANSWERED BY patient patient    RELATIONSHIP SELF SELF       Depression Screening:  :     3 most recent PHQ Screens 3/7/2019   PHQ Not Done -   Little interest or pleasure in doing things Not at all   Feeling down, depressed, irritable, or hopeless Not at all   Total Score PHQ 2 0       Fall Risk Assessment:  :     Fall Risk Assessment, last 12 mths 3/6/2017   Able to walk? Yes   Fall in past 12 months? No       Abuse Screening:  :     Abuse Screening Questionnaire 3/7/2019 2018 3/6/2017 1/15/2016 2014   Do you ever feel afraid of your partner? N N N N N   Are you in a relationship with someone who physically or mentally threatens you? N N N N N   Is it safe for you to go home?  Y Y Y Y Y       Coordination of Care Questionnaire:  :     1) Have you been to an emergency room, urgent care clinic since your last visit? no Hospitalized since your last visit? no             2) Have you seen or consulted any other health care providers outside of [address] [place] [address] since your last visit? no  (Include any pap smears or colon screenings in this section.)    3) Do you have an Advance Directive on file? no  Are you interested in receiving information about Advance Directives? no    Reviewed record in preparation for visit and have obtained necessary documentation. Medication reconciliation up to date and corrected with patient at this time.

## 2019-03-08 LAB — TSH SERPL DL<=0.005 MIU/L-ACNC: 4.29 UIU/ML (ref 0.45–4.5)

## 2019-03-08 NOTE — PROGRESS NOTES
Pt was advised and verbalized understanding. She would like to restart wellbutrin and is aware that script will be sent to her pharmacy on Monday, upon NPVivek's return to the office.

## 2019-03-08 NOTE — PROGRESS NOTES
Please call patient and let her know that her thyroid returned and is normal.  Does she want to re-start Wellbutrin?   Thanks

## 2019-03-08 NOTE — PROGRESS NOTES
Please call patient and let her know that her x-ray returned:  Hardware is intact from surgery at C5-C6, and there are some degenerative changes at C4-5 and C6-7.   She should go forth with PT, and if pain is not improved, will refer to ortho  Thanks

## 2019-03-11 RX ORDER — BUPROPION HYDROCHLORIDE 150 MG/1
150 TABLET ORAL
Qty: 30 TAB | Refills: 2 | Status: SHIPPED | OUTPATIENT
Start: 2019-03-11 | End: 2019-06-07 | Stop reason: SDUPTHER

## 2019-03-19 ENCOUNTER — TELEPHONE (OUTPATIENT)
Dept: FAMILY MEDICINE CLINIC | Age: 49
End: 2019-03-19

## 2019-03-19 NOTE — TELEPHONE ENCOUNTER
Patient is currently doing PT and is asking if Miguelangel Bradford will add order for them to work on her lower back as well.   If so please fax order over to [phone]

## 2019-04-01 DIAGNOSIS — F41.9 ANXIETY: ICD-10-CM

## 2019-04-01 RX ORDER — GABAPENTIN 800 MG/1
TABLET ORAL
Qty: 90 TAB | Refills: 4 | Status: SHIPPED | OUTPATIENT
Start: 2019-04-01 | End: 2019-07-09 | Stop reason: SDUPTHER

## 2019-04-01 RX ORDER — SERTRALINE HYDROCHLORIDE 100 MG/1
TABLET, FILM COATED ORAL
Qty: 30 TAB | Refills: 2 | Status: SHIPPED | OUTPATIENT
Start: 2019-04-01 | End: 2019-07-08 | Stop reason: SDUPTHER

## 2019-04-01 RX ORDER — NORTRIPTYLINE HYDROCHLORIDE 25 MG/1
CAPSULE ORAL
Qty: 60 CAP | Refills: 5 | Status: SHIPPED | OUTPATIENT
Start: 2019-04-01 | End: 2019-10-01 | Stop reason: SDUPTHER

## 2019-05-04 DIAGNOSIS — E03.9 ACQUIRED HYPOTHYROIDISM: ICD-10-CM

## 2019-05-04 RX ORDER — LEVOTHYROXINE SODIUM 25 UG/1
TABLET ORAL
Qty: 90 TAB | Refills: 0 | Status: SHIPPED | OUTPATIENT
Start: 2019-05-04 | End: 2019-08-25 | Stop reason: SDUPTHER

## 2019-05-16 ENCOUNTER — OFFICE VISIT (OUTPATIENT)
Dept: FAMILY MEDICINE CLINIC | Age: 49
End: 2019-05-16

## 2019-05-16 VITALS
RESPIRATION RATE: 18 BRPM | SYSTOLIC BLOOD PRESSURE: 105 MMHG | HEART RATE: 71 BPM | TEMPERATURE: 98.6 F | DIASTOLIC BLOOD PRESSURE: 60 MMHG | BODY MASS INDEX: 22.54 KG/M2 | OXYGEN SATURATION: 98 % | WEIGHT: 127.2 LBS | HEIGHT: 63 IN

## 2019-05-16 DIAGNOSIS — R11.0 NAUSEA: Primary | ICD-10-CM

## 2019-05-16 DIAGNOSIS — R51.9 HEADACHE, UNSPECIFIED HEADACHE TYPE: ICD-10-CM

## 2019-05-16 RX ORDER — BUTALBITAL, ACETAMINOPHEN AND CAFFEINE 50; 325; 40 MG/1; MG/1; MG/1
TABLET ORAL
Qty: 30 TAB | Refills: 2 | Status: SHIPPED | OUTPATIENT
Start: 2019-05-16 | End: 2019-09-04 | Stop reason: SDUPTHER

## 2019-05-16 RX ORDER — FEXOFENADINE HCL AND PSEUDOEPHEDRINE HCI 180; 240 MG/1; MG/1
1 TABLET, EXTENDED RELEASE ORAL DAILY
COMMUNITY
End: 2020-02-11

## 2019-05-16 RX ORDER — METHYLPREDNISOLONE 4 MG/1
TABLET ORAL
Refills: 0 | COMMUNITY
Start: 2019-04-24 | End: 2019-05-16 | Stop reason: ALTCHOICE

## 2019-05-16 RX ORDER — ONDANSETRON 8 MG/1
8 TABLET, ORALLY DISINTEGRATING ORAL
Qty: 20 TAB | Refills: 0 | Status: SHIPPED | OUTPATIENT
Start: 2019-05-16 | End: 2019-07-01 | Stop reason: SDUPTHER

## 2019-05-16 RX ORDER — SULINDAC 200 MG/1
TABLET ORAL
Refills: 2 | COMMUNITY
Start: 2019-04-22 | End: 2019-05-16 | Stop reason: ALTCHOICE

## 2019-05-16 NOTE — PROGRESS NOTES
All health maintenance and other pertinent information has been reviewed in preparation for today's office visit. Patient presents in the office today for:    Chief Complaint   Patient presents with    Headache     Constant headaches    Abdominal Pain    Neck Pain       1. Have you been to the ER, urgent care clinic since your last visit? Hospitalized since your last visit? No    2. Have you seen or consulted any other health care providers outside of the [address] [place] [address] since your last visit? Include any pap smears or colon screening. Yes. [facility] 3 weeks ago.

## 2019-05-16 NOTE — PROGRESS NOTES
HISTORY OF PRESENT ILLNESS  Ava Kiser is a 54 y.o. female. HPI  Pt presents with \"headaches\"    Pt states that she continues to get headaches, even with her Imitrex, Topamax, and Fioricet  She is in need of refill of Fioricet at this time. Pt states that she is under a lot of stress currently, and believes that this could be playing a part in her increase in headaches. Pt was seen by neurology, but it has been years. She is doing PT for neck and back pain, which has been very beneficial for patient. Review of Systems   Constitutional: Negative for fever. HENT: Negative for congestion. Gastrointestinal: Positive for nausea. Negative for vomiting. Neurological: Positive for headaches. Physical Exam   Constitutional: She is oriented to person, place, and time. She appears well-developed and well-nourished. HENT:   Head: Normocephalic and atraumatic. Cardiovascular: Normal rate, regular rhythm and normal heart sounds. Pulmonary/Chest: Effort normal and breath sounds normal.   Neurological: She is alert and oriented to person, place, and time. Skin: Skin is warm and dry. Psychiatric: She has a normal mood and affect. Her behavior is normal.       ASSESSMENT and PLAN    ICD-10-CM ICD-9-CM    1. Nausea R11.0 787.02 ondansetron (ZOFRAN ODT) 8 mg disintegrating tablet   2. Headache, unspecified headache type R51 784.0 butalbital-acetaminophen-caffeine (FIORICET, ESGIC) -40 mg per tablet      REFERRAL TO NEUROLOGY     Will refill Fioricet, but informed patient that she needs to be seen by neurology. She is on a multitude of medications for her headaches, and should not continue to have headaches as often as she does  Educated about calling neurology for an appointment ASAP    Pt informed to return to office with worsening of symptoms, or PRN with any questions or concerns. Pt verbalizes understanding of plan of care and denies further questions or concerns at this time.

## 2019-05-16 NOTE — PATIENT INSTRUCTIONS

## 2019-06-07 RX ORDER — BUPROPION HYDROCHLORIDE 150 MG/1
TABLET ORAL
Qty: 30 TAB | Refills: 2 | Status: SHIPPED | OUTPATIENT
Start: 2019-06-07 | End: 2019-09-03 | Stop reason: SDUPTHER

## 2019-07-01 ENCOUNTER — OFFICE VISIT (OUTPATIENT)
Dept: FAMILY MEDICINE CLINIC | Age: 49
End: 2019-07-01

## 2019-07-01 VITALS
DIASTOLIC BLOOD PRESSURE: 72 MMHG | BODY MASS INDEX: 21.97 KG/M2 | HEART RATE: 78 BPM | HEIGHT: 63 IN | RESPIRATION RATE: 18 BRPM | SYSTOLIC BLOOD PRESSURE: 110 MMHG | WEIGHT: 124 LBS

## 2019-07-01 DIAGNOSIS — J06.9 URI WITH COUGH AND CONGESTION: Primary | ICD-10-CM

## 2019-07-01 DIAGNOSIS — R11.0 NAUSEA: ICD-10-CM

## 2019-07-01 RX ORDER — ONDANSETRON 8 MG/1
8 TABLET, ORALLY DISINTEGRATING ORAL
Qty: 20 TAB | Refills: 0 | Status: SHIPPED | OUTPATIENT
Start: 2019-07-01 | End: 2019-10-22 | Stop reason: SDUPTHER

## 2019-07-01 RX ORDER — AZITHROMYCIN 250 MG/1
TABLET, FILM COATED ORAL
Qty: 6 TAB | Refills: 0 | Status: SHIPPED | OUTPATIENT
Start: 2019-07-01 | End: 2019-07-06

## 2019-07-01 NOTE — PROGRESS NOTES
HISTORY OF PRESENT ILLNESS  Kristel Cruz is a 54 y.o. female. HPI  Pt presents with \"sinus congestion and cough\"    Symptoms have been present for 4 days  Started with sore throat, which has since resolved  Sinus congestion  Sinus pressure  Cough, with mucous production  Hot and cold chills  Nausea  OTC: none  Review of Systems   Constitutional: Negative for fever. HENT: Positive for congestion, sinus pain and sore throat. Respiratory: Positive for cough and sputum production. Gastrointestinal: Positive for nausea. Negative for diarrhea and vomiting. Physical Exam   Constitutional: She is oriented to person, place, and time. She appears well-developed and well-nourished. HENT:   Head: Normocephalic and atraumatic. Right Ear: Hearing, tympanic membrane, external ear and ear canal normal.   Left Ear: Hearing, tympanic membrane, external ear and ear canal normal.   Nose: Mucosal edema and rhinorrhea present. Right sinus exhibits frontal sinus tenderness. Left sinus exhibits frontal sinus tenderness. Mouth/Throat: Oropharynx is clear and moist.   Neck: Normal range of motion. Neck supple. Cardiovascular: Normal rate, regular rhythm and normal heart sounds. Pulmonary/Chest: Effort normal. She has rales. Lymphadenopathy:     She has no cervical adenopathy. Neurological: She is alert and oriented to person, place, and time. Skin: Skin is warm and dry. Psychiatric: She has a normal mood and affect. Her behavior is normal.       ASSESSMENT and PLAN    ICD-10-CM ICD-9-CM    1. URI with cough and congestion J06.9 465.9 azithromycin (ZITHROMAX) 250 mg tablet   2. Nausea R11.0 787.02 ondansetron (ZOFRAN ODT) 8 mg disintegrating tablet     Educated about taking medication as prescribed, with food  Should stay well hydrated, and treat fever as needed    Pt informed to return to office with worsening of symptoms, or PRN with any questions or concerns.   Pt verbalizes understanding of plan of care and denies further questions or concerns at this time.

## 2019-07-08 DIAGNOSIS — R51.9 HEADACHE, UNSPECIFIED HEADACHE TYPE: ICD-10-CM

## 2019-07-08 DIAGNOSIS — F41.9 ANXIETY: ICD-10-CM

## 2019-07-08 RX ORDER — TOPIRAMATE 100 MG/1
TABLET, FILM COATED ORAL
Qty: 60 TAB | Refills: 3 | Status: SHIPPED | OUTPATIENT
Start: 2019-07-08 | End: 2019-11-10 | Stop reason: SDUPTHER

## 2019-07-08 RX ORDER — SERTRALINE HYDROCHLORIDE 100 MG/1
TABLET, FILM COATED ORAL
Qty: 30 TAB | Refills: 2 | Status: SHIPPED | OUTPATIENT
Start: 2019-07-08 | End: 2019-09-28 | Stop reason: SDUPTHER

## 2019-07-09 DIAGNOSIS — M54.2 CHRONIC NECK PAIN: Primary | ICD-10-CM

## 2019-07-09 DIAGNOSIS — G89.29 CHRONIC NECK PAIN: Primary | ICD-10-CM

## 2019-07-09 RX ORDER — GABAPENTIN 800 MG/1
TABLET ORAL
Qty: 90 TAB | Refills: 2 | Status: SHIPPED | OUTPATIENT
Start: 2019-07-09 | End: 2019-12-30 | Stop reason: SDUPTHER

## 2019-08-16 DIAGNOSIS — G43.509 PERSISTENT MIGRAINE AURA WITHOUT CEREBRAL INFARCTION AND WITHOUT STATUS MIGRAINOSUS, NOT INTRACTABLE: ICD-10-CM

## 2019-08-16 RX ORDER — SUMATRIPTAN 100 MG/1
TABLET, FILM COATED ORAL
Qty: 9 TAB | Refills: 5 | Status: SHIPPED | OUTPATIENT
Start: 2019-08-16 | End: 2019-09-13 | Stop reason: ALTCHOICE

## 2019-08-25 DIAGNOSIS — E03.9 ACQUIRED HYPOTHYROIDISM: ICD-10-CM

## 2019-08-25 RX ORDER — LEVOTHYROXINE SODIUM 25 UG/1
TABLET ORAL
Qty: 30 TAB | Refills: 2 | Status: SHIPPED | OUTPATIENT
Start: 2019-08-25 | End: 2019-12-05 | Stop reason: SDUPTHER

## 2019-09-03 RX ORDER — BUPROPION HYDROCHLORIDE 150 MG/1
TABLET ORAL
Qty: 30 TAB | Refills: 2 | Status: SHIPPED | OUTPATIENT
Start: 2019-09-03 | End: 2019-12-05 | Stop reason: SDUPTHER

## 2019-09-04 ENCOUNTER — OFFICE VISIT (OUTPATIENT)
Dept: FAMILY MEDICINE CLINIC | Age: 49
End: 2019-09-04

## 2019-09-04 VITALS
WEIGHT: 121 LBS | OXYGEN SATURATION: 100 % | RESPIRATION RATE: 16 BRPM | HEIGHT: 63 IN | TEMPERATURE: 98.5 F | SYSTOLIC BLOOD PRESSURE: 100 MMHG | HEART RATE: 75 BPM | BODY MASS INDEX: 21.44 KG/M2 | DIASTOLIC BLOOD PRESSURE: 75 MMHG

## 2019-09-04 DIAGNOSIS — G89.29 CHRONIC NECK PAIN: Primary | ICD-10-CM

## 2019-09-04 DIAGNOSIS — R51.9 HEADACHE, UNSPECIFIED HEADACHE TYPE: ICD-10-CM

## 2019-09-04 DIAGNOSIS — M54.2 CHRONIC NECK PAIN: Primary | ICD-10-CM

## 2019-09-04 RX ORDER — BUTALBITAL, ACETAMINOPHEN AND CAFFEINE 50; 325; 40 MG/1; MG/1; MG/1
TABLET ORAL
Qty: 30 TAB | Refills: 2 | Status: SHIPPED | OUTPATIENT
Start: 2019-09-04 | End: 2019-09-13 | Stop reason: ALTCHOICE

## 2019-09-04 NOTE — PROGRESS NOTES
Identified pt with two pt identifiers(name and ). Chief Complaint   Patient presents with    Headache     over the past 1-2 months on/off - has appt with neurologist on     Neck Pain     over the past 1-2 months on/off        Health Maintenance Due   Topic    Influenza Age 5 to Adult        Wt Readings from Last 3 Encounters:   19 121 lb (54.9 kg)   19 124 lb (56.2 kg)   19 127 lb 3.2 oz (57.7 kg)     Temp Readings from Last 3 Encounters:   19 98.5 °F (36.9 °C) (Oral)   19 98.6 °F (37 °C) (Oral)   19 97 °F (36.1 °C) (Oral)     BP Readings from Last 3 Encounters:   19 100/75   19 110/72   19 105/60     Pulse Readings from Last 3 Encounters:   19 75   19 78   19 71         Learning Assessment:  :     Learning Assessment 2014   PRIMARY LEARNER Patient Patient   HIGHEST LEVEL OF EDUCATION - PRIMARY LEARNER  DID NOT GRADUATE HIGH SCHOOL DID NOT GRADUATE HIGH SCHOOL   BARRIERS PRIMARY LEARNER - NONE   CO-LEARNER CAREGIVER - No   PRIMARY LANGUAGE ENGLISH ENGLISH   LEARNER PREFERENCE PRIMARY DEMONSTRATION DEMONSTRATION   ANSWERED BY patient patient    RELATIONSHIP SELF SELF       Depression Screening:  :     3 most recent PHQ Screens 3/7/2019   PHQ Not Done -   Little interest or pleasure in doing things Not at all   Feeling down, depressed, irritable, or hopeless Not at all   Total Score PHQ 2 0       Fall Risk Assessment:  :     Fall Risk Assessment, last 12 mths 3/6/2017   Able to walk? Yes   Fall in past 12 months? No       Abuse Screening:  :     Abuse Screening Questionnaire 3/7/2019 2018 3/6/2017 1/15/2016 2014   Do you ever feel afraid of your partner? N N N N N   Are you in a relationship with someone who physically or mentally threatens you? N N N N N   Is it safe for you to go home?  Y Y Y Y Y       Coordination of Care Questionnaire:  :     1) Have you been to an emergency room, urgent care clinic since your last visit? no   Hospitalized since your last visit? no             2) Have you seen or consulted any other health care providers outside of [address] [place] [address] since your last visit? no  (Include any pap smears or colon screenings in this section.)    3) Do you have an Advance Directive on file? no  Are you interested in receiving information about Advance Directives? no    Patient is accompanied by daughter. I have received verbal consent from Sean Victoria to discuss any/all medical information while they are present in the room. Reviewed record in preparation for visit and have obtained necessary documentation. Medication reconciliation up to date and corrected with patient at this time.

## 2019-09-04 NOTE — PATIENT INSTRUCTIONS
Neck Pain: Care Instructions  Your Care Instructions    You can have neck pain anywhere from the bottom of your head to the top of your shoulders. It can spread to the upper back or arms. Injuries, painting a ceiling, sleeping with your neck twisted, staying in one position for too long, and many other activities can cause neck pain. Most neck pain gets better with home care. Your doctor may recommend medicine to relieve pain or relax your muscles. He or she may suggest exercise and physical therapy to increase flexibility and relieve stress. You may need to wear a special (cervical) collar to support your neck for a day or two. Follow-up care is a key part of your treatment and safety. Be sure to make and go to all appointments, and call your doctor if you are having problems. It's also a good idea to know your test results and keep a list of the medicines you take. How can you care for yourself at home? · Try using a heating pad on a low or medium setting for 15 to 20 minutes every 2 or 3 hours. Try a warm shower in place of one session with the heating pad. · You can also try an ice pack for 10 to 15 minutes every 2 to 3 hours. Put a thin cloth between the ice and your skin. · Take pain medicines exactly as directed. ? If the doctor gave you a prescription medicine for pain, take it as prescribed. ? If you are not taking a prescription pain medicine, ask your doctor if you can take an over-the-counter medicine. · If your doctor recommends a cervical collar, wear it exactly as directed. When should you call for help? Call your doctor now or seek immediate medical care if:    · You have new or worsening numbness in your arms, buttocks or legs.     · You have new or worsening weakness in your arms or legs.  (This could make it hard to stand up.)     · You lose control of your bladder or bowels.    Watch closely for changes in your health, and be sure to contact your doctor if:    · Your neck pain is getting worse.     · You are not getting better after 1 week.     · You do not get better as expected. Where can you learn more? Go to http://smooth-connie.info/. Enter [id number] in the search box to learn more about \"Neck Pain: Care Instructions. \"  Current as of: September 20, 2018  Content Version: 12.1  © 3634-6415 [employer]. Care instructions adapted under license by [employer] (which disclaims liability or warranty for this information). If you have questions about a medical condition or this instruction, always ask your healthcare professional. [place] [address] any warranty or liability for your use of this information.

## 2019-09-04 NOTE — PROGRESS NOTES
HISTORY OF PRESENT ILLNESS  Lanny Gaming is a 54 y.o. female. HPI   Pt presents with \"headaches and neck pain\"  Pt states that she has been getting headaches daily  Sometimes they are doable, sometimes they are horrible  She has an appointment on 9/13 with Dr Lori Graham. She is still taking her Imitrex and Fioricet as needed  She needs refill today    In addition, she continues to ferrell chronic neck pain  She has been doing PT since March, started with 3 times a week and now once a week. She has made progress, but continues to have pain. She believes that the neck pain sometimes makes the headaches worse. She is wondering what is next step. Review of Systems   Constitutional: Negative for fever. HENT: Negative for nosebleeds. Gastrointestinal: Negative for diarrhea and vomiting. Musculoskeletal: Positive for neck pain. Neurological: Positive for headaches. Physical Exam   Constitutional: She is oriented to person, place, and time. She appears well-developed and well-nourished. HENT:   Head: Normocephalic and atraumatic. Eyes: Pupils are equal, round, and reactive to light. EOM are normal.   Cardiovascular: Normal rate, regular rhythm and normal heart sounds. Pulmonary/Chest: Effort normal and breath sounds normal.   Neurological: She is alert and oriented to person, place, and time. Skin: Skin is warm and dry. Psychiatric: She has a normal mood and affect. Her behavior is normal.       ASSESSMENT and PLAN    ICD-10-CM ICD-9-CM    1. Chronic neck pain M54.2 723.1 REFERRAL TO ORTHOPEDIC SURGERY    G89.29 338.29    2.  Headache, unspecified headache type R51 784.0 butalbital-acetaminophen-caffeine (FIORICET, ESGIC) -40 mg per tablet     Educated about taking medications as prescribed  Should return to office with continued or worsening of symptoms  Should call Ortho for an appointment today, and should keep neuro appointment    Pt informed to return to office with worsening of symptoms, or PRN with any questions or concerns. Pt verbalizes understanding of plan of care and denies further questions or concerns at this time.

## 2019-09-13 ENCOUNTER — OFFICE VISIT (OUTPATIENT)
Dept: NEUROLOGY | Age: 49
End: 2019-09-13

## 2019-09-13 VITALS
DIASTOLIC BLOOD PRESSURE: 72 MMHG | RESPIRATION RATE: 16 BRPM | OXYGEN SATURATION: 99 % | SYSTOLIC BLOOD PRESSURE: 98 MMHG | WEIGHT: 121 LBS | HEIGHT: 63 IN | BODY MASS INDEX: 21.44 KG/M2 | HEART RATE: 83 BPM

## 2019-09-13 DIAGNOSIS — G43.011 INTRACTABLE MIGRAINE WITHOUT AURA AND WITH STATUS MIGRAINOSUS: Primary | ICD-10-CM

## 2019-09-13 RX ORDER — ELETRIPTAN HYDROBROMIDE 40 MG/1
TABLET, FILM COATED ORAL
Qty: 9 TAB | Refills: 3 | Status: SHIPPED | OUTPATIENT
Start: 2019-09-13 | End: 2020-02-25 | Stop reason: SDUPTHER

## 2019-09-13 NOTE — PROGRESS NOTES
Chief Complaint   Patient presents with    New Patient    Migraine     had HAs for yrs but they are getting worse. occur almost daily. true migraine 5-6 per mo.  +light/sound sensitivity, nausea, worsening w/ mvmt.   pulsing/pressing/throbbing, vice-like around head

## 2019-09-13 NOTE — PATIENT INSTRUCTIONS
RESULT POLICY      If we have ordered testing for you, know that; \"NO NEWS IS GOOD NEWS! \"     It is our policy that we know longer call patients with results, nor do we  give test results over the phone. We schedule follow up appointments so that your results can be discussed in person. This allows you to address any questions you have regarding the results. If you choose to go to an imaging center outside of [facility], it is your responsibility to bring imaging report and disc to follow up appointment. If something of concern is revealed on your test, we will contact you to discuss the matter and if needed schedule a sooner follow up appointment. Additionally, results may be found by using the My Chart feature and one of our patient service representatives at the  can give you instructions on how to access this feature to utilize our electronic medical record system. Thank you for your understanding. [facility]   Statement to Patients  April 1, 2014      In an effort to ensure the large volume of patient prescription refills is processed in the most efficient and expeditious manner, we are asking our patients to assist us by calling your Pharmacy for all prescription refills, this will include also your  Mail Order Pharmacy. The pharmacy will contact our office electronically to continue the refill process. Please do not wait until the last minute to call your pharmacy. We need at least 48 hours (2days) to fill prescriptions. We also encourage you to call your pharmacy before going to  your prescription to make sure it is ready. With regard to controlled substance prescription refill requests (narcotic refills) that need to be picked up at our office, we ask your cooperation by providing us with at least 72 hours (3days) notice that you will need a refill.     We will not refill narcotic prescription refill requests after 4:00pm on any weekday, Monday through Thursday, or after 2:00pm on Fridays, or on the weekends. We encourage everyone to explore another way of getting your prescription refill request processed using [employer], our patient web portal through our electronic medical record system. [employer] is an efficient and effective way to communicate your medication request directly to the office and  downloadable as an beatris on your smart phone . [employer] also features a review functionality that allows you to view your medication list as well as leave messages for your physician. Are you ready to get connected? If so please review the attatched instructions or speak to any of our staff to get you set up right away! Thank you so much for your cooperation. Should you have any questions please contact our Practice Administrator.

## 2019-09-13 NOTE — PROGRESS NOTES
[facility] and [address] at [facility] at [address] [place] [address], [address] [facility] [address] [facility], [address] [place] [address]  [phone] Office  [phone] Facsimile           Referring: Aziza Simons MD      Chief Complaint   Patient presents with    New Patient    Migraine     +light/sound sensitivity, nausea, worsening w/ mvmt. pulsing/pressing/throbbing, vice-like around head   82-year-old woman who presents today for evaluation for headache. Since she was first diagnosed with migraines at the age of 24. She started treating them about the age of 29. She says in the last month she is only 3 or 4 headache free days. Headaches are described as like a vice and radiate around her head sometimes her right sided. Her mother sister and grandmother have headaches. Headaches are described as throbbing pulsing pressing. She has associated photophobia phonophobia nausea but no vomiting. Hurts to move. Triptan's try to been Imitrex Relpax Maxalt. She tries to lay in a dark cold quiet room. She does not have any focal neurologic deficits with a headache. She has no loss or alteration in consciousness or vision. She says that most days she has an underlying they will escalate several times a month to be more intense but she says in the last month she feels like almost every day is been an intense headache. She was given Fioricet and she is been taking one in the morning. The Imitrex is not helped. She did see a pain management physician who did a steroid injection in the cervical spine that helped her neck pain but not too much with the headaches. She continues on the gabapentin topiramate and nortriptyline. She is also on Zoloft and Wellbutrin. She has not had any injury. No chest pain. No palpitations. No shortness of breath. She does not get an aura. No numbness or tingling.   No focal weakness. She is going to see Dr. Alejandro Pierre next week just to check on the status of her fusion. Reviewed the electronic medical record finds she saw Dr. Freddy Rodríguez back in September 2014 at that time she was complaining of chronic neck pain and headache. She was describing head pain beginning at the base of the neck moving forward with separate intermittent left-sided head pain described as stabbing and throbbing and constant. At that time she was using Fioricet and Imitrex she was also on gabapentin 800 mg 3 times daily as well as Pamelor 25-50 mg nightly and Topamax 100 mg twice daily. Dr. Freddy Rodríguez indicated that she had failure of Maxalt and Relpax worked better than Imitrex. In terms of preventatives she had the Pamelor as well as the topiramate and gabapentin. She had not tried Inderal Depakote or verapamil. He felt she had occipital neuralgia bilateral and he discussed increasing her preventatives or doing a block of the occipital nerve. She wanted to proceed with nerve blocks. We renewed Zofran as well as Fioricet limiting to 3 days a week and she was supposed to follow-up after injection. Telephone call and December 2014 noted the patient was supposed to have bilateral occipital nerve blocks that were not scheduled and he renewed her Fioricet for 1 month but would not refill any further until she had a follow-up appointment. The nerve blocks were not scheduled.   Past Medical History:   Diagnosis Date    Acquired hypothyroidism 5/20/2018    Cervicalgia     Chronic neck pain 4/3/2013    Hypercholesteremia 5/18/2018    Migraine with aura, without mention of intractable migraine without mention of status migrainosus     Moderate episode of recurrent major depressive disorder ([facility] [address] 11/2/2016       Past Surgical History:   Procedure Laterality Date    HX ABDOMINAL LAPAROSCOPY      Adhesions found     HX APPENDECTOMY      HX CERVICAL FUSION  2006    HX HYSTERECTOMY  01/2010       Current Outpatient Medications   Medication Sig Dispense Refill    buPROPion XL (WELLBUTRIN XL) 150 mg tablet TAKE 1 TABLET BY MOUTH EVERY DAY IN THE MORNING 30 Tab 2    SUMAtriptan (IMITREX) 100 mg tablet TAKE 1 TABLET BY MOUTH ONCE AS NEEDED FOR MIGRAINE FOR UP TO 9 DOSES 9 Tab 5    gabapentin (NEURONTIN) 800 mg tablet TAKE 1 TABLET BY MOUTH THREE TIMES A DAY 90 Tab 2    topiramate (TOPAMAX) 100 mg tablet TAKE 1 TABLET BY MOUTH TWICE A DAY 60 Tab 3    sertraline (ZOLOFT) 100 mg tablet TAKE 1 TABLET BY MOUTH EVERY DAY 30 Tab 2    ondansetron (ZOFRAN ODT) 8 mg disintegrating tablet Take 1 Tab by mouth every eight (8) hours as needed for Nausea. 20 Tab 0    nortriptyline (PAMELOR) 25 mg capsule TAKE 1 TO 2 CAPSULES BY MOUTH IN THE EVENING AT BEDTIME (Patient taking differently: Take 50 mg by mouth nightly.) 60 Cap 5    butalbital-acetaminophen-caffeine (FIORICET, ESGIC) -40 mg per tablet TAKE 1 TABLET BY MOUTH EVERY 6 HOURS AS NEEDED FOR HEADACHE. MAX DAILY: 4 TABS 30 Tab 2    levothyroxine (SYNTHROID) 25 mcg tablet TAKE 1 TABLET BY MOUTH EVERY DAY BEFORE BREAKFAST 30 Tab 2    fexofenadine-pseudoephedrine (ALLEGRA-D 24 HOUR) 180-240 mg per tablet Take 1 Tab by mouth daily.  fluticasone (FLONASE ALLERGY RELIEF) 50 mcg/actuation nasal spray 2 Sprays by Both Nostrils route daily.           Allergies   Allergen Reactions    Latex Rash    Erythromycin Nausea and Vomiting    Vancomycin Swelling       Social History     Tobacco Use    Smoking status: Never Smoker    Smokeless tobacco: Never Used   Substance Use Topics    Alcohol use: Not Currently     Alcohol/week: 0.0 standard drinks     Comment: rarely    Drug use: Yes     Types: Marijuana       Family History   Problem Relation Age of Onset    Hypertension Father     Breast Cancer Other         not sure of age       Review of Systems  Pertinent positives and negatives as noted with remainder of comprehensive review negative      Examination  Visit Vitals  LMP 01/09/2010     Pleasant, well appearing. Dress and grooming are appropriate. No scleral icterus is present. Oropharynx is clear. Supple neck without bruit appreciated. Heart regular. Pulses are symmetrical.  No edema in the lower extremities. Neurologically, she is awake, alert, and oriented with normal speech and language. Her cognition is normal.    Intact cranial nerves 2-12. No nystagmus. Visual fields full to confrontation. Disk margins are flat bilaterally. She has normal bulk and tone. She has no abnormal movement. She has no pronation or drift. She generates full strength in the upper and lower extremities to direct confrontational testing. Reflexes are symmetrical in the upper and lower extremities bilaterally. No pathologic reflexes are elicited. Finger nose finger and rapid alternating movements are normal.  Steady gait. No sensory deficit to primary modalities. Impression/Plan  Intractable migraine headaches as noted above with underlying daily headache that escalates i.e. transformed migraine/status migrainosus. Discussed migraine and discussed migraine pathophysiology and headache and also discussed analgesic overuse. She will stop the Fioricet as we discussed analgesic overuse is quite common with this medication and if she takes anything more than twice a week then she runs the risk of analgesic overuse. We discussed migraine pathophysiology in the context of CG RP. We will start her on Aimovig 140 mg dose secondary to the severity of the headaches. Discussed administration which is injectable as well as potential side effects which really are just reported redness at the injection site and small reports of constipation. Discussed expectations; discussed studies as well. Continue with topiramate as well as the nortriptyline for now. Track headaches on a calendar bring that each appointment.   Secondary to the fact that the Imitrex has been ineffective and she is failed Maxalt as well but she has had experience with Relpax has been favorable change her back to Relpax and this is generic now. Follow in 3 months    Helena Love MD    This note was created using voice recognition software. Despite editing, there may be syntax errors. This note will not be viewable in [address]

## 2019-09-20 ENCOUNTER — TELEPHONE (OUTPATIENT)
Dept: NEUROLOGY | Age: 49
End: 2019-09-20

## 2019-09-20 NOTE — TELEPHONE ENCOUNTER
Prior Auth submitted for Eletriptan to [employer] via Cover My Meds. Status Pending. Allow 24-72 hours for response.     CMM Key: [id number]  Case #: [id number]

## 2019-09-20 NOTE — TELEPHONE ENCOUNTER
Both Eletriptan and Aimovig were DENIED by [facility] Denial reasons state: -(For Aimovig) Patient must have pregnancy test at baseline. -(For Eletriptan) Patient must have trial and failure or intolerance to the generic formulation of the requested drug (if available and covered by the state) and at least one other drug in the corresponding treatment class, such as Emgality. Not only is eletriptan generic, but she has also tried and failed Sumatriptan, which is in the same class as eletriptan. Also, Emgality is not even in the same class. Eletriptan is For abortive migraine therapy, whereas Emgality is for preventive migraine therapy. I informed the patient about the pregnancy test requirement, but she reported she had a hysterectomy in January of 2010. Will include clinical notes regarding this. Submitted appeal for Aimovig & Eletriptan URGENTLY. Should receive response within 24-72 hours. Called patient and informed her of denials and appeals and expected response time. She stated understanding.

## 2019-09-20 NOTE — TELEPHONE ENCOUNTER
Prior Auth submitted for Aimovig to [employer] via Cover My Meds. Status Pending. Allow 24-72 hours for response.     [facility] Key: [id number]  Case #: [id number]

## 2019-09-20 NOTE — TELEPHONE ENCOUNTER
----- Message from [address] sent at 9/20/2019 12:31 PM EDT -----  Regarding: Dr. Juan Corbin Message/Vendor Calls    Caller's first and last name: Gigividhya Browne  Reason for call: Pt is requesting a callback in regards to prior authoorization on a medication. She did not want to disclose any other information.   Callback required yes/no and why: Yes  Best contact number(s): [phone]  Details to clarify the request:    [address]

## 2019-09-28 DIAGNOSIS — F41.9 ANXIETY: ICD-10-CM

## 2019-09-30 ENCOUNTER — APPOINTMENT (OUTPATIENT)
Dept: MRI IMAGING | Age: 49
End: 2019-09-30
Attending: ORTHOPAEDIC SURGERY
Payer: MEDICAID

## 2019-09-30 ENCOUNTER — HOSPITAL ENCOUNTER (OUTPATIENT)
Dept: MRI IMAGING | Age: 49
Discharge: HOME | End: 2019-09-30
Attending: ORTHOPAEDIC SURGERY
Payer: MEDICAID

## 2019-09-30 DIAGNOSIS — M54.12 CERVICAL RADICULITIS: ICD-10-CM

## 2019-09-30 PROCEDURE — 72141 MRI NECK SPINE W/O DYE: CPT

## 2019-09-30 RX ORDER — SERTRALINE HYDROCHLORIDE 100 MG/1
TABLET, FILM COATED ORAL
Qty: 30 TAB | Refills: 2 | Status: SHIPPED | OUTPATIENT
Start: 2019-09-30 | End: 2020-01-06

## 2019-10-01 RX ORDER — NORTRIPTYLINE HYDROCHLORIDE 25 MG/1
CAPSULE ORAL
Qty: 60 CAP | Refills: 5 | Status: SHIPPED | OUTPATIENT
Start: 2019-10-01 | End: 2020-04-07

## 2019-10-03 ENCOUNTER — TELEPHONE (OUTPATIENT)
Dept: NEUROLOGY | Age: 49
End: 2019-10-03

## 2019-10-03 NOTE — TELEPHONE ENCOUNTER
Patient is calling to check the status of the two medications that have been pending for a few weeks now.   aimovig and eletriptan

## 2019-10-04 NOTE — TELEPHONE ENCOUNTER
Patient is calling again to check the status of the PAs for the two medications that have been pending for a few weeks now. Patient is upset that there hasn't been a follow up call. Patient stated she is in a lot of pain and has been for a while now.

## 2019-10-04 NOTE — TELEPHONE ENCOUNTER
[facility] department. Spoke with rep, who stated the Prior [address] for Aimovig & Eletriptan. Both  2019. Called pharmacy to lift hold on meds. Pharmacy stated that Eletriptan was still denying. Ran PA again in Cover My Meds, and the system stated . Called pharmacy back, and she stated it was still kicking back stating Prior Auth needed. Pharmacist stated that it may need to go through [address] [place] [address]. Called [employer] department, and they stated it was still in denial status and that there was no record of appeal for Eletriptan. Will call [employer] again to confirm appeal status.

## 2019-10-14 DIAGNOSIS — G89.29 CHRONIC NECK PAIN: Primary | ICD-10-CM

## 2019-10-14 DIAGNOSIS — M54.2 CHRONIC NECK PAIN: Primary | ICD-10-CM

## 2019-10-14 RX ORDER — GABAPENTIN 800 MG/1
TABLET ORAL
Qty: 90 TAB | Refills: 2 | Status: SHIPPED | OUTPATIENT
Start: 2019-10-14 | End: 2020-01-13 | Stop reason: SDUPTHER

## 2019-10-22 ENCOUNTER — OFFICE VISIT (OUTPATIENT)
Dept: FAMILY MEDICINE CLINIC | Age: 49
End: 2019-10-22

## 2019-10-22 ENCOUNTER — TELEPHONE (OUTPATIENT)
Dept: FAMILY MEDICINE CLINIC | Age: 49
End: 2019-10-22

## 2019-10-22 VITALS
SYSTOLIC BLOOD PRESSURE: 118 MMHG | TEMPERATURE: 98.1 F | DIASTOLIC BLOOD PRESSURE: 70 MMHG | HEIGHT: 63 IN | RESPIRATION RATE: 18 BRPM | BODY MASS INDEX: 21.09 KG/M2 | OXYGEN SATURATION: 99 % | WEIGHT: 119 LBS | HEART RATE: 79 BPM

## 2019-10-22 DIAGNOSIS — K58.0 IRRITABLE BOWEL SYNDROME WITH DIARRHEA: ICD-10-CM

## 2019-10-22 DIAGNOSIS — R63.4 WEIGHT LOSS: ICD-10-CM

## 2019-10-22 DIAGNOSIS — R11.0 NAUSEA: ICD-10-CM

## 2019-10-22 DIAGNOSIS — G89.29 CHRONIC NECK PAIN: Primary | ICD-10-CM

## 2019-10-22 DIAGNOSIS — M54.2 CHRONIC NECK PAIN: Primary | ICD-10-CM

## 2019-10-22 RX ORDER — TRAMADOL HYDROCHLORIDE 50 MG/1
50 TABLET ORAL
Qty: 20 TAB | Refills: 0 | Status: SHIPPED | OUTPATIENT
Start: 2019-10-22 | End: 2019-10-29

## 2019-10-22 RX ORDER — MELOXICAM 15 MG/1
15 TABLET ORAL DAILY
COMMUNITY
End: 2020-02-11

## 2019-10-22 RX ORDER — DICYCLOMINE HYDROCHLORIDE 10 MG/1
10 CAPSULE ORAL 3 TIMES DAILY
Qty: 90 CAP | Refills: 2 | Status: SHIPPED | OUTPATIENT
Start: 2019-10-22 | End: 2020-10-26 | Stop reason: SDUPTHER

## 2019-10-22 RX ORDER — CYCLOBENZAPRINE HCL 5 MG
5 TABLET ORAL
COMMUNITY
Start: 2019-10-15 | End: 2020-02-11

## 2019-10-22 RX ORDER — ONDANSETRON 4 MG/1
4 TABLET, ORALLY DISINTEGRATING ORAL
Qty: 20 TAB | Refills: 2 | Status: SHIPPED | OUTPATIENT
Start: 2019-10-22 | End: 2020-04-27

## 2019-10-22 NOTE — PROGRESS NOTES
Identified pt with two pt identifiers(name and ). Chief Complaint   Patient presents with    Weight Loss    Medication Refill     Bentyl        Health Maintenance Due   Topic    Influenza Age 5 to Adult        Wt Readings from Last 3 Encounters:   10/22/19 119 lb (54 kg)   19 121 lb (54.9 kg)   19 121 lb (54.9 kg)     Temp Readings from Last 3 Encounters:   19 98.5 °F (36.9 °C) (Oral)   19 98.6 °F (37 °C) (Oral)     BP Readings from Last 3 Encounters:   19 98/72   19 100/75   19 110/72     Pulse Readings from Last 3 Encounters:   19 83   19 75   19 78         Learning Assessment:  :     Learning Assessment 2019   PRIMARY LEARNER Patient Patient Patient   HIGHEST LEVEL OF EDUCATION - PRIMARY LEARNER  - DID NOT GRADUATE HIGH SCHOOL DID NOT GRADUATE HIGH SCHOOL   BARRIERS PRIMARY LEARNER NONE - NONE   CO-LEARNER CAREGIVER No - No   PRIMARY LANGUAGE ENGLISH ENGLISH ENGLISH   LEARNER PREFERENCE PRIMARY READING DEMONSTRATION DEMONSTRATION     LISTENING - -     DEMONSTRATION - -   ANSWERED BY self patient patient    RELATIONSHIP SELF SELF SELF       Depression Screening:  :     3 most recent PHQ Screens 3/7/2019   PHQ Not Done -   Little interest or pleasure in doing things Not at all   Feeling down, depressed, irritable, or hopeless Not at all   Total Score PHQ 2 0       Fall Risk Assessment:  :     Fall Risk Assessment, last 12 mths 3/6/2017   Able to walk? Yes   Fall in past 12 months? No       Abuse Screening:  :     Abuse Screening Questionnaire 3/7/2019 2018 3/6/2017 1/15/2016 2014   Do you ever feel afraid of your partner? N N N N N   Are you in a relationship with someone who physically or mentally threatens you? N N N N N   Is it safe for you to go home?  Y Y Y Y Y       Coordination of Care Questionnaire:  :     1) Have you been to an emergency room, urgent care clinic since your last visit? no   Hospitalized since your last visit? no             2) Have you seen or consulted any other health care providers outside of [address] [place] [address] since your last visit? no  (Include any pap smears or colon screenings in this section.)    3) Do you have an Advance Directive on file? no  Are you interested in receiving information about Advance Directives? no    Reviewed record in preparation for visit and have obtained necessary documentation. Medication reconciliation up to date and corrected with patient at this time.

## 2019-10-22 NOTE — PROGRESS NOTES
Subjective:      John Downs is a 54 y.o. female here with complaint of abdominal cramping, weight loss, and chronic neck pain. She has been having abdominal cramping which she attributes to her IBS. Onset was several days ago which she attributes to stress. Associated with diarrhea. She reports that her weight continues to decline and she is not actively trying to lose weight. She has been trying to increase certain foods in her diet. She has concern with the IBS that there may be an underlying reason for this. Requests refill of Bentyl and Zofran. Wt Readings from Last 5 Encounters:   10/22/19 119 lb (54 kg)   09/13/19 121 lb (54.9 kg)   09/04/19 121 lb (54.9 kg)   07/01/19 124 lb (56.2 kg)   05/16/19 127 lb 3.2 oz (57.7 kg)       Chronic neck pain: has been evaluated by orthopedics and had had MRI performed which shows DDD, left foramina stenosis and disc protrusion. Scheduled for [facility] 11/8/19 with Dr. Tim Tinajero. She has been intermittently treated with Tramadol in the past for pain with her last Rx 8/2018. She has been taking her other prescribed medications as directed with minimal relief. Current Outpatient Medications   Medication Sig Dispense Refill    cyclobenzaprine (FLEXERIL) 5 mg tablet Take 5 mg by mouth two (2) times daily as needed.  meloxicam (MOBIC) 15 mg tablet Take 15 mg by mouth daily.  gabapentin (NEURONTIN) 800 mg tablet TAKE 1 TABLET BY MOUTH THREE TIMES A DAY 90 Tab 2    nortriptyline (PAMELOR) 25 mg capsule TAKE 1 TO 2 CAPSULES BY MOUTH IN THE EVENING AT BEDTIME 60 Cap 5    sertraline (ZOLOFT) 100 mg tablet TAKE 1 TABLET BY MOUTH EVERY DAY 30 Tab 2    erenumab-aooe (AIMOVIG AUTOINJECTOR) 140 mg/mL injection 1 mL by SubCUTAneous route every thirty (30) days.  1 Each 3    eletriptan (RELPAX) 40 mg tablet 1 at h aonset and may repeat in 2 hours if necessary 9 Tab 3    buPROPion XL (WELLBUTRIN XL) 150 mg tablet TAKE 1 TABLET BY MOUTH EVERY DAY IN THE MORNING 30 Tab 2    levothyroxine (SYNTHROID) 25 mcg tablet TAKE 1 TABLET BY MOUTH EVERY DAY BEFORE BREAKFAST 30 Tab 2    gabapentin (NEURONTIN) 800 mg tablet TAKE 1 TABLET BY MOUTH THREE TIMES A DAY 90 Tab 2    topiramate (TOPAMAX) 100 mg tablet TAKE 1 TABLET BY MOUTH TWICE A DAY 60 Tab 3    ondansetron (ZOFRAN ODT) 8 mg disintegrating tablet Take 1 Tab by mouth every eight (8) hours as needed for Nausea. 20 Tab 0    fexofenadine-pseudoephedrine (ALLEGRA-D 24 HOUR) 180-240 mg per tablet Take 1 Tab by mouth daily. Allergies   Allergen Reactions    Latex Rash    Erythromycin Nausea and Vomiting    Vancomycin Swelling       Past Medical History:   Diagnosis Date    Acquired hypothyroidism 5/20/2018    Cervicalgia     Chronic neck pain 4/3/2013    Hypercholesteremia 5/18/2018    Migraine with aura, without mention of intractable migraine without mention of status migrainosus     Moderate episode of recurrent major depressive disorder ([facility] [address] 11/2/2016       Social History     Tobacco Use    Smoking status: Never Smoker    Smokeless tobacco: Never Used   Substance Use Topics    Alcohol use: Not Currently     Alcohol/week: 0.0 standard drinks     Comment: rarely        Review of Systems  Pertinent items are noted in HPI.      Objective:     Visit Vitals  /70 (BP 1 Location: Right arm, BP Patient Position: Sitting) Comment: Manual   Pulse 79   Temp 98.1 °F (36.7 °C) (Oral) Comment: .   Resp 18   Ht 5' 3\" (1.6 m)   Wt 119 lb (54 kg)   LMP 01/09/2010   SpO2 99%   BMI 21.08 kg/m²      General appearance - alert, well appearing, and in no distress  Eyes - pupils equal and reactive, extraocular eye movements intact, sclera anicteric  Oropharyngx - mucous membranes moist, pharynx normal without lesions  Neck - supple, no significant adenopathy, thyroid exam: thyroid is normal in size without nodules or tenderness  Chest - clear to auscultation, no wheezes, rales or rhonchi, symmetric air entry, no tachypnea, retractions or cyanosis  Heart - normal rate, regular rhythm, normal S1, S2, no murmurs, rubs, clicks or gallops  Abdomen - soft, nontender, nondistended, no masses or organomegaly, bowel sounds normal    Assessment/Plan:   Katerina Brennan is a 54 y.o. female seen for:     1. Chronic neck pain: will prescribe tramadol as below for short-term. Follow up with pain management as scheduled. - traMADol (ULTRAM) 50 mg tablet; Take 1 Tab by mouth every eight (8) hours as needed for Pain for up to 7 days. Max Daily Amount: 150 mg. Dispense: 20 Tab; Refill: 0    2. Weight loss: weight down 8 lbs by our office scales in the past 6 months. Will check labs as below for further evaluation.   - CBC WITH AUTOMATED DIFF  - METABOLIC PANEL, COMPREHENSIVE  - TSH AND FREE T4  - SED RATE (ESR)  - C REACTIVE PROTEIN, QT    3. Irritable bowel syndrome with diarrhea  - dicyclomine (BENTYL) 10 mg capsule; Take 1 Cap by mouth three (3) times daily. Indications: irritable colon  Dispense: 90 Cap; Refill: 2    4. Nausea  - ondansetron (ZOFRAN ODT) 4 mg disintegrating tablet; Take 1 Tab by mouth every eight (8) hours as needed for Nausea. Dispense: 20 Tab; Refill: 2    I have discussed the diagnosis with the patient and the intended plan as seen in the above orders. The patient has received an after-visit summary and questions were answered concerning future plans. I have discussed medication side effects and warnings with the patient as well. Patient verbalizes understanding of plan of care and denies further questions or concerns at this time. Informed patient to return to the office if symptoms worsen or if new symptoms arise. Follow-up and Dispositions    · Return if symptoms worsen or fail to improve.

## 2019-10-22 NOTE — PATIENT INSTRUCTIONS
Abnormal Weight Loss: Care Instructions  Your Care Instructions    There are two types of weight loss--normal and abnormal. The normal kind happens when you are trying to lose weight by exercising more or eating less. The abnormal kind happens when you are not trying to lose weight. Many medical problems can cause abnormal weight loss. These include problems with your thyroid gland, long-term infections, mouth or throat problems that make it hard to eat, and digestive problems. They also include depression and cancer. Some medicines also may cause you to lose weight. You can work with your doctor to find the cause of your weight loss. You will probably need tests to do this. Follow-up care is a key part of your treatment and safety. Be sure to make and go to all appointments, and call your doctor if you are having problems. It's also a good idea to know your test results and keep a list of the medicines you take. How can you care for yourself at home? · Weigh yourself at the same time every day. It's best to do it first thing in the morning after you empty your bladder. Be sure to always wear the same amount of clothing. · Write down any changes in your weight and the possible causes. Discuss these with your doctor. · Your doctor may want you to change your diet. Do your best to follow his or her advice. · Ask your doctor if you should see a dietitian. This is a person who can help you plan meals that work best for your lifestyle. · Note any changes in bowel habits. These may include changes in how often you have a bowel movement. Other changes include the color and size of your stools and how solid they are. · If you are prescribed medicines, take them exactly as prescribed. Call your doctor if you think you are having a problem with your medicine. You will get more details on the specific medicines your doctor prescribes. When should you call for help?   Watch closely for changes in your health, and be sure to contact your doctor if:    · You do not get better as expected.     · You continue to lose weight. Where can you learn more? Go to http://smooth-connie.info/. Enter A790 in the search box to learn more about \"Abnormal Weight Loss: Care Instructions. \"  Current as of: March 28, 2019  Content Version: 12.2  © 6239-4144 [employer]. Care instructions adapted under license by [employer] (which disclaims liability or warranty for this information). If you have questions about a medical condition or this instruction, always ask your healthcare professional. [place] [address] any warranty or liability for your use of this information.

## 2019-10-22 NOTE — TELEPHONE ENCOUNTER
Pt script from 7/9/19 for gabapentin has been voided and shredded.     Pt picked up gabapentin script written 10/14/19

## 2019-10-24 ENCOUNTER — HOSPITAL ENCOUNTER (OUTPATIENT)
Dept: MRI IMAGING | Age: 49
Discharge: HOME | End: 2019-10-24
Attending: ORTHOPAEDIC SURGERY
Payer: MEDICAID

## 2019-10-24 DIAGNOSIS — G89.29 CHRONIC NECK PAIN: ICD-10-CM

## 2019-10-24 DIAGNOSIS — M54.2 CHRONIC NECK PAIN: ICD-10-CM

## 2019-10-24 DIAGNOSIS — M54.16 LUMBAR RADICULOPATHY: ICD-10-CM

## 2019-10-24 PROCEDURE — 72148 MRI LUMBAR SPINE W/O DYE: CPT

## 2019-10-24 RX ORDER — GABAPENTIN 800 MG/1
TABLET ORAL
Qty: 90 TAB | Refills: 2 | OUTPATIENT
Start: 2019-10-24

## 2019-10-31 LAB
ALBUMIN SERPL-MCNC: 4.3 G/DL (ref 3.5–5.5)
ALBUMIN/GLOB SERPL: 1.5 {RATIO} (ref 1.2–2.2)
ALP SERPL-CCNC: 102 IU/L (ref 39–117)
ALT SERPL-CCNC: 78 IU/L (ref 0–32)
AST SERPL-CCNC: 75 IU/L (ref 0–40)
BASOPHILS # BLD AUTO: 0.1 X10E3/UL (ref 0–0.2)
BASOPHILS NFR BLD AUTO: 1 %
BILIRUB SERPL-MCNC: <0.2 MG/DL (ref 0–1.2)
BUN SERPL-MCNC: 5 MG/DL (ref 6–24)
BUN/CREAT SERPL: 5 (ref 9–23)
CALCIUM SERPL-MCNC: 9.4 MG/DL (ref 8.7–10.2)
CHLORIDE SERPL-SCNC: 100 MMOL/L (ref 96–106)
CO2 SERPL-SCNC: 24 MMOL/L (ref 20–29)
CREAT SERPL-MCNC: 1 MG/DL (ref 0.57–1)
CRP SERPL-MCNC: 4 MG/L (ref 0–10)
EOSINOPHIL # BLD AUTO: 0.6 X10E3/UL (ref 0–0.4)
EOSINOPHIL NFR BLD AUTO: 8 %
ERYTHROCYTE [DISTWIDTH] IN BLOOD BY AUTOMATED COUNT: 12.3 % (ref 12.3–15.4)
ERYTHROCYTE [SEDIMENTATION RATE] IN BLOOD BY WESTERGREN METHOD: 4 MM/HR (ref 0–32)
GLOBULIN SER CALC-MCNC: 2.8 G/DL (ref 1.5–4.5)
GLUCOSE SERPL-MCNC: 91 MG/DL (ref 65–99)
HCT VFR BLD AUTO: 39.1 % (ref 34–46.6)
HGB BLD-MCNC: 13.1 G/DL (ref 11.1–15.9)
IMM GRANULOCYTES # BLD AUTO: 0 X10E3/UL (ref 0–0.1)
IMM GRANULOCYTES NFR BLD AUTO: 0 %
LYMPHOCYTES # BLD AUTO: 2.6 X10E3/UL (ref 0.7–3.1)
LYMPHOCYTES NFR BLD AUTO: 36 %
MCH RBC QN AUTO: 31.7 PG (ref 26.6–33)
MCHC RBC AUTO-ENTMCNC: 33.5 G/DL (ref 31.5–35.7)
MCV RBC AUTO: 95 FL (ref 79–97)
MONOCYTES # BLD AUTO: 0.5 X10E3/UL (ref 0.1–0.9)
MONOCYTES NFR BLD AUTO: 7 %
NEUTROPHILS # BLD AUTO: 3.5 X10E3/UL (ref 1.4–7)
NEUTROPHILS NFR BLD AUTO: 48 %
PLATELET # BLD AUTO: 266 X10E3/UL (ref 150–450)
POTASSIUM SERPL-SCNC: 3.6 MMOL/L (ref 3.5–5.2)
PROT SERPL-MCNC: 7.1 G/DL (ref 6–8.5)
RBC # BLD AUTO: 4.13 X10E6/UL (ref 3.77–5.28)
SODIUM SERPL-SCNC: 141 MMOL/L (ref 134–144)
T4 FREE SERPL-MCNC: 0.74 NG/DL (ref 0.82–1.77)
TSH SERPL DL<=0.005 MIU/L-ACNC: 3.1 UIU/ML (ref 0.45–4.5)
WBC # BLD AUTO: 7.3 X10E3/UL (ref 3.4–10.8)

## 2019-11-10 DIAGNOSIS — R51.9 HEADACHE, UNSPECIFIED HEADACHE TYPE: ICD-10-CM

## 2019-11-10 RX ORDER — TOPIRAMATE 100 MG/1
TABLET, FILM COATED ORAL
Qty: 60 TAB | Refills: 3 | Status: SHIPPED | OUTPATIENT
Start: 2019-11-10 | End: 2020-02-11 | Stop reason: SDUPTHER

## 2019-11-10 RX ORDER — BUPROPION HYDROCHLORIDE 150 MG/1
TABLET ORAL
Qty: 30 TAB | Refills: 2 | OUTPATIENT
Start: 2019-11-10

## 2019-11-19 ENCOUNTER — TELEPHONE (OUTPATIENT)
Dept: FAMILY MEDICINE CLINIC | Age: 49
End: 2019-11-19

## 2019-12-03 ENCOUNTER — TELEPHONE (OUTPATIENT)
Dept: NEUROLOGY | Age: 49
End: 2019-12-03

## 2019-12-03 NOTE — TELEPHONE ENCOUNTER
Patient has an appt scheduled for 12/12 and she is due for her next Aimovig shot on 12/10. She said each time she takes it she's headache free for 2 weeks and then they come back. She's requesting a call back.

## 2019-12-04 NOTE — TELEPHONE ENCOUNTER
Called and spoke with patient and advised her since she is still getting headaches for 2 weeks straight before her next inj is due to not give herself the [address] on 12/10, come in and see Dr. Riva Hodgkins 12/12 and he will discuss other options. Patient states understanding.

## 2019-12-05 DIAGNOSIS — E03.9 ACQUIRED HYPOTHYROIDISM: ICD-10-CM

## 2019-12-11 ENCOUNTER — TELEPHONE (OUTPATIENT)
Dept: NEUROLOGY | Age: 49
End: 2019-12-11

## 2019-12-11 NOTE — TELEPHONE ENCOUNTER
Patient is requesting a call from the nurse to figure out a medication for her headaches.  The aimovig isnt working

## 2019-12-12 NOTE — TELEPHONE ENCOUNTER
Returned pt call, pt states aimovig 140 seems to work well for the first 20 days after taking then HAs come back the last 10 days prior to next dose. She wanted to make sure she should take the next dose of Aimovig due prior to the upcoming appt with [place] on 12/30/19. Advised her to take it as we do not want her to go 2.5 wks over in case HAs return prior to starting cgrp and she may discuss with [facility] if another cgrp may work better or suggest something else to help. Pt agrees.

## 2019-12-13 RX ORDER — BUPROPION HYDROCHLORIDE 150 MG/1
TABLET ORAL
Qty: 30 TAB | Refills: 5 | Status: SHIPPED | OUTPATIENT
Start: 2019-12-13 | End: 2020-06-12

## 2019-12-13 RX ORDER — LEVOTHYROXINE SODIUM 25 UG/1
TABLET ORAL
Qty: 30 TAB | Refills: 5 | Status: SHIPPED | OUTPATIENT
Start: 2019-12-13 | End: 2020-06-12

## 2019-12-13 NOTE — TELEPHONE ENCOUNTER
I have not seen pt in over a year.   Will forward request to Dr Luis Mendes
Pt is requesting med refill. Pt states she has been trying to get this for a couple weeks and she sees Dr Keven Hester and Dr Valerie Workman. Also pt states she isn't able to see her neurologist until 1/28/19 so she is asking if medication Fioricet could be filled by provider in the meantime. It doesn't look like this has ever been filled here.
Strong peripheral pulses

## 2019-12-16 ENCOUNTER — TELEPHONE (OUTPATIENT)
Dept: FAMILY MEDICINE CLINIC | Age: 49
End: 2019-12-16

## 2019-12-30 ENCOUNTER — OFFICE VISIT (OUTPATIENT)
Dept: NEUROLOGY | Age: 49
End: 2019-12-30

## 2019-12-30 VITALS
RESPIRATION RATE: 16 BRPM | DIASTOLIC BLOOD PRESSURE: 60 MMHG | HEIGHT: 63 IN | BODY MASS INDEX: 21.09 KG/M2 | OXYGEN SATURATION: 98 % | WEIGHT: 119.05 LBS | SYSTOLIC BLOOD PRESSURE: 92 MMHG | HEART RATE: 97 BPM

## 2019-12-30 DIAGNOSIS — G43.011 INTRACTABLE MIGRAINE WITHOUT AURA AND WITH STATUS MIGRAINOSUS: Primary | ICD-10-CM

## 2019-12-30 NOTE — PROGRESS NOTES
[address] [facility],5Th Floor  [address]   [address] [place] [address] [place] [address] [address]   [phone] Office   [phone] Fax           Date:  19     Name:  Renu Menon  :  1970  MRN:  [id number]     PCP:  Tonya Thomason MD    No chief complaint on file. HISTORY OF PRESENT ILLNESS: Follow up for migraines. At her initial office visit with Dr. Emmanuel Martinez in 2019, she was started on Aimovig for prevention of migraines. She is using the 140mg dose monthly. No side effects. Thought initially it worked really well but is finding that it is not as effective as it was. When she initially saw Dr. Emmanuel Martinez, she reported having only 3-4 headache free days in a month. After starting the [address] [place] [address] Road, she states that she did not have any for almost three weeks. Towards, the last week prior to her next injection, they would worsen. Her last injection was . She does have a current headache which is more all over the head today. It does not lateralize and she does not have any light or sound sensitivity. She is getting a little nausea. She does have chronic neck pain issues for which the gabapentin and nortriptyline were started primarily. She is taking topamax twice a day. Headache Characterization: throbbing pulsing pressing, movement makes them worse  Pain Level: 3-8/10  Aura: No   Frequency/Length: She feels like she has had the present headache for five days. Typically, they last a couple of days at most.   Location: usually on one side or the other, behind the right eye typically  Nausea/Vomiting: Yes/No  Photophobia: Yes  Phonophobia: Yes  Provoking factors: weather changes, stress, certain smells  Relieving factors: Relpax dulls the headache but does not abort them. She does tend to wait until her head hurts to treat it.    Focal neurologic symptoms with headache: None    Meds:  Prior abortive tx: Imitrex Relpax Maxalt Fioricet  Prior preventative tx: gabapentin topiramate and nortriptyline    Current abortive tx: Relpax  Current preventative tx: gabapentin topiramate and nortriptyline, Aimovig    Family Hx of headaches/migraines: mother sister and grandmother     Social:  Work:  Caffeine:  Tobacco use:  Sleep habits:  Exercise:        Except as noted above, denies  fever, chills, cough. No CP or SOB. No dysuria, loss of bowel or bladder control. No Weight loss. Appetite good. Sleeping well. No sweats. No edema. No bruising or bleeding. No nausea or vomit. No diarrhea. No frequency, urgency, No depressive sxs. No anxiety. Denies sore throat, nasal congestion, nasal discharge, epistaxis, tinnitus, hearing loss, back pain, muscle pain, or joint pain. Current Outpatient Medications   Medication Sig    levothyroxine (SYNTHROID) 25 mcg tablet TAKE 1 TABLET BY MOUTH EVERY DAY BEFORE BREAKFAST    buPROPion XL (WELLBUTRIN XL) 150 mg tablet TAKE 1 TABLET BY MOUTH EVERY DAY IN THE MORNING    topiramate (TOPAMAX) 100 mg tablet TAKE 1 TABLET BY MOUTH TWICE A DAY    cyclobenzaprine (FLEXERIL) 5 mg tablet Take 5 mg by mouth two (2) times daily as needed.  meloxicam (MOBIC) 15 mg tablet Take 15 mg by mouth daily.  dicyclomine (BENTYL) 10 mg capsule Take 1 Cap by mouth three (3) times daily. Indications: irritable colon    ondansetron (ZOFRAN ODT) 4 mg disintegrating tablet Take 1 Tab by mouth every eight (8) hours as needed for Nausea.  gabapentin (NEURONTIN) 800 mg tablet TAKE 1 TABLET BY MOUTH THREE TIMES A DAY    nortriptyline (PAMELOR) 25 mg capsule TAKE 1 TO 2 CAPSULES BY MOUTH IN THE EVENING AT BEDTIME    sertraline (ZOLOFT) 100 mg tablet TAKE 1 TABLET BY MOUTH EVERY DAY    erenumab-aooe (AIMOVIG AUTOINJECTOR) 140 mg/mL injection 1 mL by SubCUTAneous route every thirty (30) days.     eletriptan (RELPAX) 40 mg tablet 1 at h aonset and may repeat in 2 hours if necessary    fexofenadine-pseudoephedrine (ALLEGRA-D 24 HOUR) 180-240 mg per tablet Take 1 Tab by mouth daily. No current facility-administered medications for this visit.       Allergies   Allergen Reactions    Latex Rash    Erythromycin Nausea and Vomiting    Vancomycin Swelling     Past Medical History:   Diagnosis Date    Acquired hypothyroidism 5/20/2018    Cervicalgia     Chronic neck pain 4/3/2013    Hypercholesteremia 5/18/2018    Migraine with aura, without mention of intractable migraine without mention of status migrainosus     Moderate episode of recurrent major depressive disorder ([facility] [address] 11/2/2016     Past Surgical History:   Procedure Laterality Date    HX ABDOMINAL LAPAROSCOPY      Adhesions found     HX APPENDECTOMY      HX CERVICAL FUSION  2006    HX HYSTERECTOMY  01/2010     Social History     Socioeconomic History    Marital status:      Spouse name: Not on file    Number of children: Not on file    Years of education: Not on file    Highest education level: Not on file   Occupational History    Not on file   Social Needs    Financial resource strain: Not on file    Food insecurity:     Worry: Not on file     Inability: Not on file    Transportation needs:     Medical: Not on file     Non-medical: Not on file   Tobacco Use    Smoking status: Never Smoker    Smokeless tobacco: Never Used   Substance and Sexual Activity    Alcohol use: Not Currently     Alcohol/week: 0.0 standard drinks     Comment: rarely    Drug use: Yes     Types: Marijuana    Sexual activity: Yes     Partners: Male     Birth control/protection: Surgical   Lifestyle    Physical activity:     Days per week: Not on file     Minutes per session: Not on file    Stress: Not on file   Relationships    Social connections:     Talks on phone: Not on file     Gets together: Not on file     Attends Hinduism service: Not on file     Active member of club or organization: Not on file     Attends meetings of clubs or organizations: Not on file Relationship status: Not on file    Intimate partner violence:     Fear of current or ex partner: Not on file     Emotionally abused: Not on file     Physically abused: Not on file     Forced sexual activity: Not on file   Other Topics Concern    Not on file   Social History Narrative    Not on file     Family History   Problem Relation Age of Onset    Hypertension Father     Breast Cancer Other         not sure of age       PHYSICAL EXAMINATION:    There were no vitals taken for this visit. General:  Well defined, nourished, and groomed individual in no acute distress. Neck: Supple, nontender, no bruits, no pain with resistance to active range of motion. Heart: Regular rate and rhythm, no murmurs, rub, or gallop. Normal S1S2. Lungs:  Clear to auscultation bilaterally with equal chest expansion, no cough, no wheeze  Musculoskeletal:  Extremities revealed no edema and had full range of motion of joints. Psych:  Good mood and bright affect    NEUROLOGICAL EXAMINATION:     Mental Status:   Alert and oriented to person, place, and time with recent and remote memory intact. Attention span and concentration are normal. Speech is fluent with a full fund of knowledge. Cranial Nerves:  I: smell Not tested   II: visual fields Full to confrontation   II: pupils Equal, round, reactive to light   II: optic disc No papilledema   III,VII: ptosis none   III,IV,VI: extraocular muscles  Full ROM   V: mastication normal   V: facial light touch sensation  normal   VII: facial muscle function   symmetric   VIII: hearing symmetric   IX: soft palate elevation  normal   XI: trapezius strength  5/5   XI: sternocleidomastoid strength 5/5   XI: neck flexion strength  5/5   XII: tongue  midline     Motor Examination: Normal tone, bulk, and strength, 5/5 muscle strength throughout. No cogwheel rigidity or clonus present. Sensory exam:  Normal throughout to pinprick, temperature, and vibration sense.   Normal proprioception. Coordination:  Heel-to-shin was smooth and symmetrical bilaterally. Finger to nose and rapid arm movement testing was normal.   No resting or intention tremor    Gait and Station:  Steady while walking on toes, heels, and with tandem walking. Normal arm swing. No Rhomberg or pronator drift. No muscle wasting or fasiculations noted. Reflexes:  DTRs 2+ throughout. Toes downgoing. ASSESSMENT AND PLAN    ICD-10-CM ICD-9-CM    1. Intractable migraine without aura and with status migrainosus G43.011 346.13      She was provided education on migraine today to include aura, prodrome, potential triggers, non-pharmacologic and pharmacologic treatment, and pathophysiology of migraine. Written information was provided as well. She will track her headaches and bring the headache diary with her to her next office visit. For now, I would like for her to remain on the [address] [place] [address] Road for cumulative benefit. The slight increase in headache activity in the last month may be circumstantial given sudden swings in the weather temperature and increased stress due to the holidays. Discussed using the Relpax at the earliest indication of migraine and the potential for adding an antiinflammatory to this at the same time. Recommended she try taking the topamax all at night rather than splitting the dose as I have found that once a day dosing seems more beneficial. It does sound as though she has at least some cervicogenic component as well as a more traditional tension type headache as well. Follow up in six weeks. Kara A. Alban Bosworth

## 2020-01-04 DIAGNOSIS — F41.9 ANXIETY: ICD-10-CM

## 2020-01-06 RX ORDER — SERTRALINE HYDROCHLORIDE 100 MG/1
TABLET, FILM COATED ORAL
Qty: 90 TAB | Refills: 1 | Status: SHIPPED | OUTPATIENT
Start: 2020-01-06 | End: 2020-02-11

## 2020-01-13 DIAGNOSIS — M54.2 CHRONIC NECK PAIN: ICD-10-CM

## 2020-01-13 DIAGNOSIS — G89.29 CHRONIC NECK PAIN: ICD-10-CM

## 2020-01-13 RX ORDER — GABAPENTIN 800 MG/1
TABLET ORAL
Qty: 90 TAB | Refills: 2 | Status: SHIPPED | OUTPATIENT
Start: 2020-01-13 | End: 2020-04-29

## 2020-02-11 ENCOUNTER — OFFICE VISIT (OUTPATIENT)
Dept: NEUROLOGY | Age: 50
End: 2020-02-11

## 2020-02-11 VITALS
BODY MASS INDEX: 21.72 KG/M2 | WEIGHT: 122.6 LBS | SYSTOLIC BLOOD PRESSURE: 98 MMHG | HEART RATE: 92 BPM | RESPIRATION RATE: 18 BRPM | OXYGEN SATURATION: 98 % | HEIGHT: 63 IN | DIASTOLIC BLOOD PRESSURE: 68 MMHG

## 2020-02-11 DIAGNOSIS — R51.9 HEADACHE, UNSPECIFIED HEADACHE TYPE: ICD-10-CM

## 2020-02-11 DIAGNOSIS — G43.011 INTRACTABLE MIGRAINE WITHOUT AURA AND WITH STATUS MIGRAINOSUS: Primary | ICD-10-CM

## 2020-02-11 RX ORDER — SERTRALINE HYDROCHLORIDE 50 MG/1
TABLET, FILM COATED ORAL DAILY
COMMUNITY
End: 2020-10-12

## 2020-02-11 RX ORDER — TOPIRAMATE 100 MG/1
200 TABLET, FILM COATED ORAL
Qty: 60 TAB | Refills: 3 | Status: SHIPPED | OUTPATIENT
Start: 2020-02-11 | End: 2020-06-22 | Stop reason: DRUGHIGH

## 2020-02-11 NOTE — PROGRESS NOTES
Patient states since her last visit her headaches have been better. She had maybe 7 migraines and about 2 that was really bad and lasted about the whole day. She states the relpax has been helping her a lot along with 4 ibruphen. .  Chief Complaint   Patient presents with    Migraine       .   Visit Vitals  BP 98/68 (BP 1 Location: Right arm, BP Patient Position: Sitting)   Pulse 92   Resp 18   Ht 5' 3\" (1.6 m)   Wt 122 lb 9.6 oz (55.6 kg)   SpO2 98%   BMI 21.72 kg/m²

## 2020-02-11 NOTE — PROGRESS NOTES
[address] [facility],5[address]  [address]   [place] [address] [place] [address] [facility] [address]    [phone] Office   [phone] Fax           Date:  20     Name:  Leslie Campbell  :  1970  MRN:  [id number]     PCP:  Toma Rhodes MD    Chief Complaint   Patient presents with    Migraine     HISTORY OF PRESENT ILLNESS: Follow up for migraines. At her last office visit, she was instructed to continue with the [address] [place] [address] for cumulative benefit and to track headaches for frequency, prodrome, aura, and trigger. She found that switching the entire dose of the Topamax to night and not waiting to take the Relpax until her head is throbbing has helped. She does have chronic neck pain issues for which the gabapentin and nortriptyline were started primarily. She is taking topamax twice a day. Headache Characterization: throbbing pulsing pressing, movement makes them worse  Pain Level: 3-8/10  Aura: No   Frequency/Length: In the last six weeks, she has had maybe seven headaches, two that she might call a migraine. They are lasting about  Location: usually on one side or the other, behind the right eye typically  Nausea/Vomiting: Yes/No  Photophobia: Yes  Phonophobia: Yes  Provoking factors: weather changes, stress, certain smells  Relieving factors: Relpax  Focal neurologic symptoms with headache: None    Meds:  Prior abortive tx: Imitrex Relpax Maxalt Fioricet  Prior preventative tx: gabapentin topiramate and nortriptyline    Current abortive tx: Relpax  Current preventative tx: gabapentin topiramate and nortriptyline, Aimovig    Family Hx of headaches/migraines: mother sister and grandmother     Recap from last office visit: She was provided education on migraine today to include aura, prodrome, potential triggers, non-pharmacologic and pharmacologic treatment, and pathophysiology of migraine.  Written information was provided as well. She will track her headaches and bring the headache diary with her to her next office visit. For now, I would like for her to remain on the [address] for cumulative benefit. The slight increase in headache activity in the last month may be circumstantial given sudden swings in the weather temperature and increased stress due to the holidays. Discussed using the Relpax at the earliest indication of migraine and the potential for adding an antiinflammatory to this at the same time. Recommended she try taking the topamax all at night rather than splitting the dose as I have found that once a day dosing seems more beneficial. It does sound as though she has at least some cervicogenic component as well as a more traditional tension type headache as well. Follow up in six weeks. Current Outpatient Medications   Medication Sig    sertraline (ZOLOFT) 50 mg tablet Take  by mouth daily.  multivitamin, tx-iron-ca-min (THERA-M W/ IRON) 9 mg iron-400 mcg tab tablet Take 1 Tab by mouth daily.  gabapentin (NEURONTIN) 800 mg tablet TAKE 1 TABLET BY MOUTH THREE TIMES A DAY    levothyroxine (SYNTHROID) 25 mcg tablet TAKE 1 TABLET BY MOUTH EVERY DAY BEFORE BREAKFAST    buPROPion XL (WELLBUTRIN XL) 150 mg tablet TAKE 1 TABLET BY MOUTH EVERY DAY IN THE MORNING    topiramate (TOPAMAX) 100 mg tablet TAKE 1 TABLET BY MOUTH TWICE A DAY    dicyclomine (BENTYL) 10 mg capsule Take 1 Cap by mouth three (3) times daily. Indications: irritable colon    ondansetron (ZOFRAN ODT) 4 mg disintegrating tablet Take 1 Tab by mouth every eight (8) hours as needed for Nausea.  nortriptyline (PAMELOR) 25 mg capsule TAKE 1 TO 2 CAPSULES BY MOUTH IN THE EVENING AT BEDTIME    erenumab-aooe (AIMOVIG AUTOINJECTOR) 140 mg/mL injection 1 mL by SubCUTAneous route every thirty (30) days.  eletriptan (RELPAX) 40 mg tablet 1 at h aonset and may repeat in 2 hours if necessary     No current facility-administered medications for this visit. Allergies   Allergen Reactions    Latex Rash    Erythromycin Nausea and Vomiting    Vancomycin Swelling     Past Medical History:   Diagnosis Date    Acquired hypothyroidism 5/20/2018    Cervicalgia     Chronic neck pain 4/3/2013    Hypercholesteremia 5/18/2018    Migraine with aura, without mention of intractable migraine without mention of status migrainosus     Moderate episode of recurrent major depressive disorder ([facility] [address] 11/2/2016     Past Surgical History:   Procedure Laterality Date    HX ABDOMINAL LAPAROSCOPY      Adhesions found     HX APPENDECTOMY      HX CERVICAL FUSION  2006    HX HYSTERECTOMY  01/2010     Social History     Socioeconomic History    Marital status:      Spouse name: Not on file    Number of children: Not on file    Years of education: Not on file    Highest education level: Not on file   Occupational History    Not on file   Social Needs    Financial resource strain: Not on file    Food insecurity:     Worry: Not on file     Inability: Not on file    Transportation needs:     Medical: Not on file     Non-medical: Not on file   Tobacco Use    Smoking status: Never Smoker    Smokeless tobacco: Never Used   Substance and Sexual Activity    Alcohol use: Not Currently     Alcohol/week: 0.0 standard drinks     Comment: rarely    Drug use: Yes     Types: Marijuana    Sexual activity: Yes     Partners: Male     Birth control/protection: Surgical   Lifestyle    Physical activity:     Days per week: Not on file     Minutes per session: Not on file    Stress: Not on file   Relationships    Social connections:     Talks on phone: Not on file     Gets together: Not on file     Attends Restorationist service: Not on file     Active member of club or organization: Not on file     Attends meetings of clubs or organizations: Not on file     Relationship status: Not on file    Intimate partner violence:     Fear of current or ex partner: Not on file     Emotionally abused: Not on file     Physically abused: Not on file     Forced sexual activity: Not on file   Other Topics Concern    Not on file   Social History Narrative    Not on file     Family History   Problem Relation Age of Onset    Hypertension Father     Breast Cancer Other         not sure of age       PHYSICAL EXAMINATION:    Visit Vitals  BP 98/68 (BP 1 Location: Right arm, BP Patient Position: Sitting)   Pulse 92   Resp 18   Ht 5' 3\" (1.6 m)   Wt 55.6 kg (122 lb 9.6 oz)   SpO2 98%   BMI 21.72 kg/m²     General:  Well defined, nourished, and groomed individual in no acute distress. Neck: Supple, nontender, no bruits, no pain with resistance to active range of motion. Heart: Regular rate and rhythm, no murmurs, rub, or gallop. Normal S1S2. Lungs:  Clear to auscultation bilaterally with equal chest expansion, no cough, no wheeze  Musculoskeletal:  Extremities revealed no edema and had full range of motion of joints. Psych:  Good mood and bright affect    NEUROLOGICAL EXAMINATION:     Mental Status:   Alert and oriented to person, place, and time with recent and remote memory intact. Cranial Nerves:  I: smell Not tested   II: visual fields Full to confrontation   II: pupils Equal, round, reactive to light   II: optic disc No papilledema   III,VII: ptosis none   III,IV,VI: extraocular muscles  Full ROM   V: mastication normal   V: facial light touch sensation  normal   VII: facial muscle function   symmetric   VIII: hearing symmetric   IX: soft palate elevation  normal   XI: trapezius strength  5/5   XI: sternocleidomastoid strength 5/5   XI: neck flexion strength  5/5   XII: tongue  midline     Motor Examination: Normal tone, bulk, and strength, 5/5 muscle strength throughout. Coordination:  Finger to nose was normal.   No resting or intention tremor    Gait and Station:  Steady while walking. Normal arm swing. No pronator drift. No muscle wasting or fasiculations noted.       Reflexes: DTRs 2+ throughout. ASSESSMENT AND PLAN    ICD-10-CM ICD-9-CM    1. Intractable migraine without aura and with status migrainosus G43.011 346.13 topiramate (TOPAMAX) 100 mg tablet      erenumab-aooe (AIMOVIG AUTOINJECTOR) 140 mg/mL injection   2. Headache, unspecified headache type R51 784.0 topiramate (TOPAMAX) 100 mg tablet     Discussed headache management moving forward. She will continue with Aimovig 140mg monthly and Topamax 200mg nightly. If the migraines stabilize further, then we may try to start weaning the Topamax slowly. We discussed the potential for decreasing the gabapentin and the Pamelor, but as these are more for the neck pain, I would start with the migraine preventative first. Continue to track headaches. Follow up in three months    [facility].  Anisa Cardoso

## 2020-02-25 DIAGNOSIS — G43.011 INTRACTABLE MIGRAINE WITHOUT AURA AND WITH STATUS MIGRAINOSUS: Primary | ICD-10-CM

## 2020-02-25 RX ORDER — ELETRIPTAN HYDROBROMIDE 40 MG/1
TABLET, FILM COATED ORAL
Qty: 9 TAB | Refills: 3 | Status: SHIPPED | OUTPATIENT
Start: 2020-02-25 | End: 2020-02-28 | Stop reason: SDUPTHER

## 2020-02-28 ENCOUNTER — TELEPHONE (OUTPATIENT)
Dept: NEUROLOGY | Age: 50
End: 2020-02-28

## 2020-02-28 DIAGNOSIS — G43.011 INTRACTABLE MIGRAINE WITHOUT AURA AND WITH STATUS MIGRAINOSUS: ICD-10-CM

## 2020-03-02 RX ORDER — ELETRIPTAN HYDROBROMIDE 40 MG/1
TABLET, FILM COATED ORAL
Qty: 9 TAB | Refills: 3 | Status: SHIPPED | OUTPATIENT
Start: 2020-03-02 | End: 2020-10-21

## 2020-03-16 ENCOUNTER — TELEPHONE (OUTPATIENT)
Dept: NEUROLOGY | Age: 50
End: 2020-03-16

## 2020-03-16 DIAGNOSIS — G43.011 INTRACTABLE MIGRAINE WITHOUT AURA AND WITH STATUS MIGRAINOSUS: Primary | ICD-10-CM

## 2020-03-16 RX ORDER — ERENUMAB-AOOE 140 MG/ML
140 INJECTION, SOLUTION SUBCUTANEOUS ONCE
Qty: 1 EACH | Refills: 0 | Status: SHIPPED | COMMUNITY
Start: 2020-03-16 | End: 2020-03-16

## 2020-03-16 NOTE — TELEPHONE ENCOUNTER
Patient's medicaid is going through the renewal process so she doesn't have any right now and is asking for a sample of Aimovig. She would like a call back.

## 2020-04-07 RX ORDER — NORTRIPTYLINE HYDROCHLORIDE 25 MG/1
CAPSULE ORAL
Qty: 60 CAP | Refills: 5 | Status: SHIPPED | OUTPATIENT
Start: 2020-04-07 | End: 2020-10-09

## 2020-04-12 ENCOUNTER — PATIENT MESSAGE (OUTPATIENT)
Dept: NEUROLOGY | Age: 50
End: 2020-04-12

## 2020-04-12 DIAGNOSIS — G43.011 INTRACTABLE MIGRAINE WITHOUT AURA AND WITH STATUS MIGRAINOSUS: Primary | ICD-10-CM

## 2020-04-13 RX ORDER — ERENUMAB-AOOE 140 MG/ML
140 INJECTION, SOLUTION SUBCUTANEOUS ONCE
Qty: 1 EACH | Refills: 0 | Status: SHIPPED | COMMUNITY
Start: 2020-04-13 | End: 2020-04-13

## 2020-04-27 DIAGNOSIS — R51.9 HEADACHE, UNSPECIFIED HEADACHE TYPE: ICD-10-CM

## 2020-04-27 DIAGNOSIS — M54.2 CHRONIC NECK PAIN: ICD-10-CM

## 2020-04-27 DIAGNOSIS — G89.29 CHRONIC NECK PAIN: ICD-10-CM

## 2020-04-27 DIAGNOSIS — G43.011 INTRACTABLE MIGRAINE WITHOUT AURA AND WITH STATUS MIGRAINOSUS: ICD-10-CM

## 2020-04-27 DIAGNOSIS — R11.0 NAUSEA: ICD-10-CM

## 2020-04-27 RX ORDER — ONDANSETRON 4 MG/1
TABLET, ORALLY DISINTEGRATING ORAL
Qty: 15 TAB | Refills: 3 | Status: SHIPPED | OUTPATIENT
Start: 2020-04-27 | End: 2020-10-12

## 2020-04-27 RX ORDER — TOPIRAMATE 100 MG/1
TABLET, FILM COATED ORAL
Qty: 60 TAB | Refills: 3 | OUTPATIENT
Start: 2020-04-27

## 2020-04-29 RX ORDER — GABAPENTIN 800 MG/1
TABLET ORAL
Qty: 90 TAB | Refills: 2 | Status: SHIPPED | OUTPATIENT
Start: 2020-04-29 | End: 2020-10-12 | Stop reason: SDUPTHER

## 2020-05-19 ENCOUNTER — OFFICE VISIT (OUTPATIENT)
Dept: NEUROLOGY | Age: 50
End: 2020-05-19

## 2020-05-19 VITALS
TEMPERATURE: 98.3 F | HEIGHT: 63 IN | SYSTOLIC BLOOD PRESSURE: 104 MMHG | OXYGEN SATURATION: 100 % | DIASTOLIC BLOOD PRESSURE: 60 MMHG | BODY MASS INDEX: 23.37 KG/M2 | HEART RATE: 88 BPM | RESPIRATION RATE: 18 BRPM | WEIGHT: 131.9 LBS

## 2020-05-19 DIAGNOSIS — G43.011 INTRACTABLE MIGRAINE WITHOUT AURA AND WITH STATUS MIGRAINOSUS: Primary | ICD-10-CM

## 2020-05-19 RX ORDER — TOPIRAMATE 25 MG/1
TABLET ORAL
Qty: 42 TAB | Refills: 0 | Status: SHIPPED | OUTPATIENT
Start: 2020-05-19 | End: 2020-06-22 | Stop reason: DRUGHIGH

## 2020-05-19 NOTE — PROGRESS NOTES
[address] [facility],[address]  [address] Emila Fieldorfa [address]   [place] [address] [place] [address] [address]   [phone] Office   [phone] Fax           Date:  20     Name:  Miguelangel Priest  :  1970  MRN:  [id number]     PCP:  Jolynn Abrams MD    Chief Complaint   Patient presents with    Headache     5-7 headaches per month     HISTORY OF PRESENT ILLNESS: Follow up for migraines. Continues with Topamax 200mg nightly and Aimovig 140mg/month for migraine prevention. She has been getting about 5-6 migraines per month for which she has been taking Relpax and Ibuprofen together. This has made the migraines very manageable. She indicates that she is taking the abortive treatments as soon as she realizes that she is getting a migraine and this has worked out well. She continues with gabapentin and Pamelor for her chronic neck pain which when it acts up is a known trigger for the migraine. Current Outpatient Medications   Medication Sig    IBUPROFEN M PO Take  by mouth.  gabapentin (NEURONTIN) 800 mg tablet TAKE 1 TABLET BY MOUTH THREE TIMES A DAY    ondansetron (ZOFRAN ODT) 4 mg disintegrating tablet DISSOLVE 1 TABLET ON TONGUE EVERY 8 HOURS AS NEEDED FOR NAUSEA    nortriptyline (PAMELOR) 25 mg capsule TAKE 1 TO 2 CAPSULES BY MOUTH IN THE EVENING AT BEDTIME    eletriptan (RELPAX) 40 mg tablet 1 at h aonset and may repeat in 2 hours if necessary    erenumab-aooe (AIMOVIG) 140 mg/mL injection INJECT 1 ML BY SUBCUTANEOUS ROUTE EVERY THIRTY (30) DAYS.  topiramate (TOPAMAX) 100 mg tablet Take 2 Tabs by mouth nightly.  levothyroxine (SYNTHROID) 25 mcg tablet TAKE 1 TABLET BY MOUTH EVERY DAY BEFORE BREAKFAST    buPROPion XL (WELLBUTRIN XL) 150 mg tablet TAKE 1 TABLET BY MOUTH EVERY DAY IN THE MORNING    dicyclomine (BENTYL) 10 mg capsule Take 1 Cap by mouth three (3) times daily.  Indications: irritable colon    sertraline (ZOLOFT) 50 mg tablet Take  by mouth daily.  multivitamin, tx-iron-ca-min (THERA-M W/ IRON) 9 mg iron-400 mcg tab tablet Take 1 Tab by mouth daily. No current facility-administered medications for this visit.       Allergies   Allergen Reactions    Latex Rash    Erythromycin Nausea and Vomiting    Vancomycin Swelling     Past Medical History:   Diagnosis Date    Acquired hypothyroidism 5/20/2018    Cervicalgia     Chronic neck pain 4/3/2013    Hypercholesteremia 5/18/2018    Migraine with aura, without mention of intractable migraine without mention of status migrainosus     Moderate episode of recurrent major depressive disorder ([facility] [address] 11/2/2016     Past Surgical History:   Procedure Laterality Date    HX ABDOMINAL LAPAROSCOPY      Adhesions found     HX APPENDECTOMY      HX CERVICAL FUSION  2006    HX HYSTERECTOMY  01/2010     Social History     Socioeconomic History    Marital status:      Spouse name: Not on file    Number of children: Not on file    Years of education: Not on file    Highest education level: Not on file   Occupational History    Not on file   Social Needs    Financial resource strain: Not on file    Food insecurity     Worry: Not on file     Inability: Not on file   [employer] needs     Medical: Not on file     Non-medical: Not on file   Tobacco Use    Smoking status: Never Smoker    Smokeless tobacco: Never Used   Substance and Sexual Activity    Alcohol use: Not Currently     Alcohol/week: 0.0 standard drinks     Comment: rarely    Drug use: Yes     Types: Marijuana    Sexual activity: Yes     Partners: Male     Birth control/protection: Surgical   Lifestyle    Physical activity     Days per week: Not on file     Minutes per session: Not on file    Stress: Not on file   Relationships    Social connections     Talks on phone: Not on file     Gets together: Not on file     Attends Confucianism service: Not on file     Active member of club or organization: Not on file     Attends meetings of clubs or organizations: Not on file     Relationship status: Not on file    Intimate partner violence     Fear of current or ex partner: Not on file     Emotionally abused: Not on file     Physically abused: Not on file     Forced sexual activity: Not on file   Other Topics Concern    Not on file   Social History Narrative    Not on file     Family History   Problem Relation Age of Onset    Hypertension Father     Breast Cancer Other         not sure of age       PHYSICAL EXAMINATION:    Visit Vitals  /60   Pulse 88   Temp 98.3 °F (36.8 °C) (Oral)   Resp 18   Ht 5' 3\" (1.6 m)   Wt 59.8 kg (131 lb 14.4 oz)   SpO2 100%   BMI 23.37 kg/m²     General:  Well defined, nourished, and groomed individual in no acute distress. Neck: Supple, nontender, no bruits, no pain with resistance to active range of motion. Heart: Regular rate and rhythm, no murmurs, rub, or gallop. Normal S1S2. Lungs:  Clear to auscultation bilaterally with equal chest expansion, no cough, no wheeze  Musculoskeletal:  Extremities revealed no edema and had full range of motion of joints. Psych:  Good mood and bright affect    NEUROLOGICAL EXAMINATION:     Mental Status:   Alert and oriented to person, place, and time with recent and remote memory intact. Cranial Nerves:  I: smell Not tested   II: visual fields Full to confrontation   II: pupils Equal, round, reactive to light   II: optic disc No papilledema   III,VII: ptosis none   III,IV,VI: extraocular muscles  Full ROM   V: mastication normal   V: facial light touch sensation  normal   VII: facial muscle function   symmetric   VIII: hearing symmetric   IX: soft palate elevation  normal   XI: trapezius strength  5/5   XI: sternocleidomastoid strength 5/5   XI: neck flexion strength  5/5   XII: tongue  midline     Motor Examination: Normal tone, bulk, and strength, 5/5 muscle strength throughout.       Coordination:  Finger to nose was normal. No resting or intention tremor    Gait and Station:  Steady while walking. Normal arm swing. No pronator drift. No muscle wasting or fasiculations noted. Reflexes:  DTRs 2+ throughout. ASSESSMENT AND PLAN    ICD-10-CM ICD-9-CM    1. Intractable migraine without aura and with status migrainosus G43.011 346.13 topiramate (TOPAMAX) 25 mg tablet     Discussed headache management moving forward. Since her migraine pattern is unchanged from her previous visit, she will continue with Aimovig 140mg monthly. We will try to slowly wean her from the Topamax by 25mg per week. Initially, we will just try to wean down to 100mg. If she has not seen any change with the lower dose, then we will try to wean her off the last 100mg of this. The gabapentin and the Pameloare more for the neck pain which is stable. Follow up in three months    [facility].  Lissette Hill

## 2020-06-01 ENCOUNTER — TELEPHONE (OUTPATIENT)
Dept: NEUROLOGY | Age: 50
End: 2020-06-01

## 2020-06-01 DIAGNOSIS — G43.011 INTRACTABLE MIGRAINE WITHOUT AURA AND WITH STATUS MIGRAINOSUS: Primary | ICD-10-CM

## 2020-06-01 RX ORDER — GALCANEZUMAB 120 MG/ML
120 INJECTION, SOLUTION SUBCUTANEOUS
Qty: 1 ML | Refills: 3 | Status: SHIPPED | OUTPATIENT
Start: 2020-06-01 | End: 2020-11-17

## 2020-06-01 RX ORDER — GALCANEZUMAB 120 MG/ML
240 INJECTION, SOLUTION SUBCUTANEOUS ONCE
Qty: 2 ML | Refills: 0 | Status: SHIPPED | OUTPATIENT
Start: 2020-06-01 | End: 2020-06-01

## 2020-06-01 NOTE — TELEPHONE ENCOUNTER
Prior Authorization APPROVED for [facility] by [employer] via Cover My Meds. Effective dates 06/01/20 - 08/30/20. Case #[id number]. Approval will be scanned into media.  Pharmacy notified of approval.     MARIAH Key: [id number]

## 2020-06-12 DIAGNOSIS — E03.9 ACQUIRED HYPOTHYROIDISM: ICD-10-CM

## 2020-06-12 RX ORDER — LEVOTHYROXINE SODIUM 25 UG/1
TABLET ORAL
Qty: 30 TAB | Refills: 5 | Status: SHIPPED | OUTPATIENT
Start: 2020-06-12 | End: 2020-10-15 | Stop reason: DRUGHIGH

## 2020-06-12 RX ORDER — BUPROPION HYDROCHLORIDE 150 MG/1
TABLET ORAL
Qty: 30 TAB | Refills: 5 | Status: SHIPPED | OUTPATIENT
Start: 2020-06-12 | End: 2021-08-18 | Stop reason: ALTCHOICE

## 2020-06-22 DIAGNOSIS — R51.9 HEADACHE, UNSPECIFIED HEADACHE TYPE: ICD-10-CM

## 2020-06-22 DIAGNOSIS — G43.011 INTRACTABLE MIGRAINE WITHOUT AURA AND WITH STATUS MIGRAINOSUS: ICD-10-CM

## 2020-06-22 RX ORDER — TOPIRAMATE 100 MG/1
200 TABLET, FILM COATED ORAL
Qty: 60 TAB | Refills: 3 | Status: SHIPPED | OUTPATIENT
Start: 2020-06-22 | End: 2020-10-12

## 2020-09-21 ENCOUNTER — TELEPHONE (OUTPATIENT)
Dept: NEUROLOGY | Age: 50
End: 2020-09-21

## 2020-09-21 NOTE — TELEPHONE ENCOUNTER
Prior Authorization APPROVED for [facility] by [employer] via Cover my Meds. Effective dates 09/21/20 - 09/21/21, Case # [id number]    Approval will be scanned into media.  Pharmacy will be notifed of approval.    MARIAH Key: [id number]

## 2020-10-09 RX ORDER — NORTRIPTYLINE HYDROCHLORIDE 25 MG/1
CAPSULE ORAL
Qty: 60 CAP | Refills: 0 | Status: SHIPPED | OUTPATIENT
Start: 2020-10-09 | End: 2020-10-12 | Stop reason: SDUPTHER

## 2020-10-12 ENCOUNTER — OFFICE VISIT (OUTPATIENT)
Dept: FAMILY MEDICINE CLINIC | Age: 50
End: 2020-10-12
Payer: MEDICAID

## 2020-10-12 VITALS
DIASTOLIC BLOOD PRESSURE: 80 MMHG | RESPIRATION RATE: 20 BRPM | WEIGHT: 135.2 LBS | OXYGEN SATURATION: 97 % | TEMPERATURE: 99 F | SYSTOLIC BLOOD PRESSURE: 102 MMHG | HEART RATE: 68 BPM | BODY MASS INDEX: 23.96 KG/M2 | HEIGHT: 63 IN

## 2020-10-12 DIAGNOSIS — M54.2 NECK PAIN: ICD-10-CM

## 2020-10-12 DIAGNOSIS — Z23 NEEDS FLU SHOT: ICD-10-CM

## 2020-10-12 DIAGNOSIS — M77.8 LEFT WRIST TENDONITIS: ICD-10-CM

## 2020-10-12 DIAGNOSIS — E78.00 HYPERCHOLESTEREMIA: ICD-10-CM

## 2020-10-12 DIAGNOSIS — G89.29 CHRONIC NECK PAIN: Primary | ICD-10-CM

## 2020-10-12 DIAGNOSIS — Z79.899 ENCOUNTER FOR LONG-TERM CURRENT USE OF MEDICATION: ICD-10-CM

## 2020-10-12 DIAGNOSIS — G43.011 INTRACTABLE MIGRAINE WITHOUT AURA AND WITH STATUS MIGRAINOSUS: ICD-10-CM

## 2020-10-12 DIAGNOSIS — E55.9 VITAMIN D DEFICIENCY: ICD-10-CM

## 2020-10-12 DIAGNOSIS — E03.9 ACQUIRED HYPOTHYROIDISM: ICD-10-CM

## 2020-10-12 DIAGNOSIS — M54.2 CHRONIC NECK PAIN: Primary | ICD-10-CM

## 2020-10-12 PROCEDURE — 99214 OFFICE O/P EST MOD 30 MIN: CPT | Performed by: FAMILY MEDICINE

## 2020-10-12 PROCEDURE — 90471 IMMUNIZATION ADMIN: CPT | Performed by: FAMILY MEDICINE

## 2020-10-12 PROCEDURE — 90686 IIV4 VACC NO PRSV 0.5 ML IM: CPT | Performed by: FAMILY MEDICINE

## 2020-10-12 RX ORDER — NORTRIPTYLINE HYDROCHLORIDE 25 MG/1
CAPSULE ORAL
Qty: 60 CAP | Refills: 5 | Status: SHIPPED | OUTPATIENT
Start: 2020-10-12 | End: 2021-05-03

## 2020-10-12 RX ORDER — GABAPENTIN 800 MG/1
800 TABLET ORAL 3 TIMES DAILY
Qty: 90 TAB | Refills: 2 | Status: SHIPPED | OUTPATIENT
Start: 2020-10-12 | End: 2021-02-09

## 2020-10-12 RX ORDER — IBUPROFEN 800 MG/1
800 TABLET ORAL
Qty: 30 TAB | Refills: 0 | Status: SHIPPED | OUTPATIENT
Start: 2020-10-12 | End: 2020-10-23

## 2020-10-12 RX ORDER — ELETRIPTAN HYDROBROMIDE 40 MG/1
TABLET, FILM COATED ORAL
Qty: 9 TAB | Refills: 3 | Status: CANCELLED | OUTPATIENT
Start: 2020-10-12

## 2020-10-12 NOTE — PROGRESS NOTES
Identified pt with two pt identifiers(name and ). Chief Complaint   Patient presents with    Back Pain    Medication Refill    Immunization/Injection     flu shot         Health Maintenance Due   Topic    Flu Vaccine (1)       Wt Readings from Last 3 Encounters:   10/12/20 135 lb 3.2 oz (61.3 kg)   20 131 lb 14.4 oz (59.8 kg)   20 122 lb 9.6 oz (55.6 kg)     Temp Readings from Last 3 Encounters:   10/12/20 99 °F (37.2 °C) (Oral)   20 98.3 °F (36.8 °C) (Oral)   10/22/19 98.1 °F (36.7 °C) (Oral)     BP Readings from Last 3 Encounters:   10/12/20 102/80   20 104/60   20 98/68     Pulse Readings from Last 3 Encounters:   10/12/20 68   20 88   20 92         Learning Assessment:  :     Learning Assessment 2019   PRIMARY LEARNER Patient Patient Patient   HIGHEST LEVEL OF EDUCATION - PRIMARY LEARNER  - DID NOT GRADUATE HIGH SCHOOL DID NOT GRADUATE HIGH SCHOOL   BARRIERS PRIMARY LEARNER NONE - NONE   CO-LEARNER CAREGIVER No - No   PRIMARY LANGUAGE ENGLISH ENGLISH ENGLISH   LEARNER PREFERENCE PRIMARY READING DEMONSTRATION DEMONSTRATION     LISTENING - -     DEMONSTRATION - -   ANSWERED BY self patient patient    RELATIONSHIP SELF SELF SELF       Depression Screening:  :     3 most recent PHQ Screens 10/12/2020   PHQ Not Done -   Little interest or pleasure in doing things Not at all   Feeling down, depressed, irritable, or hopeless Not at all   Total Score PHQ 2 0       Fall Risk Assessment:  :     Fall Risk Assessment, last 12 mths 3/6/2017   Able to walk? Yes   Fall in past 12 months? No       Abuse Screening:  :     Abuse Screening Questionnaire 10/12/2020 3/7/2019 2018 3/6/2017 1/15/2016 2014   Do you ever feel afraid of your partner? N N N N N N   Are you in a relationship with someone who physically or mentally threatens you? N N N N N N   Is it safe for you to go home?  Alyssa Snyder       Coordination of Care Questionnaire:  :     1) Have you been to an emergency room, urgent care clinic since your last visit? no   Hospitalized since your last visit? no             2) Have you seen or consulted any other health care providers outside of [address] [place] [address] since your last visit? yes  Rayo Thomson and Dr Stephen Cam (Include any pap smears or colon screenings in this section.)    3) Do you have an Advance Directive on file? no  Are you interested in receiving information about Advance Directives? no    Reviewed record in preparation for visit and have obtained necessary documentation. Medication reconciliation up to date and corrected with patient at this time. Patient provided with update VIS sheet prior to administration. Opportunity for any questions or concerns. None at this time.

## 2020-10-12 NOTE — PATIENT INSTRUCTIONS
Vaccine Information Statement Influenza (Flu) Vaccine (Inactivated or Recombinant): What You Need to Know Many Vaccine Information Statements are available in Faroese and other languages. See www.immunize.org/vis Hojas de información sobre vacunas están disponibles en español y en muchos otros idiomas. Visite www.immunize.org/vis 1. Why get vaccinated? Influenza vaccine can prevent influenza (flu). Flu is a contagious disease that spreads around the [facility] every year, usually between October and May. Anyone can get the flu, but it is more dangerous for some people. Infants and young children, people 72years of age and older, pregnant women, and people with certain health conditions or a weakened immune system are at greatest risk of flu complications. Pneumonia, bronchitis, sinus infections and ear infections are examples of flu-related complications. If you have a medical condition, such as heart disease, cancer or diabetes, flu can make it worse. Flu can cause fever and chills, sore throat, muscle aches, fatigue, cough, headache, and runny or stuffy nose. Some people may have vomiting and diarrhea, though this is more common in children than adults. Each year thousands of people in the [facility] die from flu, and many more are hospitalized. Flu vaccine prevents millions of illnesses and flu-related visits to the doctor each year. 2. Influenza vaccines CDC recommends everyone 10months of age and older get vaccinated every flu season. Children 6 months through 6years of age may need 2 doses during a single flu season. Everyone else needs only 1 dose each flu season. It takes about 2 weeks for protection to develop after vaccination. There are many flu viruses, and they are always changing. Each year a new flu vaccine is made to protect against three or four viruses that are likely to cause disease in the upcoming flu season.  Even when the vaccine doesnt exactly match these viruses, it may still provide some protection. Influenza vaccine does not cause flu. Influenza vaccine may be given at the same time as other vaccines. 3. Talk with your health care provider Tell your vaccine provider if the person getting the vaccine: 
 Has had an allergic reaction after a previous dose of influenza vaccine, or has any severe, life-threatening allergies.  Has ever had Guillain-Barré Syndrome (also called GBS). In some cases, your health care provider may decide to postpone influenza vaccination to a future visit. People with minor illnesses, such as a cold, may be vaccinated. People who are moderately or severely ill should usually wait until they recover before getting influenza vaccine. Your health care provider can give you more information. 4. Risks of a reaction  Soreness, redness, and swelling where shot is given, fever, muscle aches, and headache can happen after influenza vaccine.  There may be a very small increased risk of Guillain-Barré Syndrome (GBS) after inactivated influenza vaccine (the flu shot). Akhil Lowry children who get the flu shot along with pneumococcal vaccine (PCV13), and/or DTaP vaccine at the same time might be slightly more likely to have a seizure caused by fever. Tell your health care provider if a child who is getting flu vaccine has ever had a seizure. People sometimes faint after medical procedures, including vaccination. Tell your provider if you feel dizzy or have vision changes or ringing in the ears. As with any medicine, there is a very remote chance of a vaccine causing a severe allergic reaction, other serious injury, or death. 5. What if there is a serious problem? An allergic reaction could occur after the vaccinated person leaves the clinic.  If you see signs of a severe allergic reaction (hives, swelling of the face and throat, difficulty breathing, a fast heartbeat, dizziness, or weakness), call 9-1-1 and get the person to the nearest hospital. 
 
For other signs that concern you, call your health care provider. Adverse reactions should be reported to the Vaccine Adverse Event Reporting System (VAERS). Your health care provider will usually file this report, or you can do it yourself. Visit the VAERS website at www.vaers. hhs.gov or call 5-553.583.3240. VAERS is only for reporting reactions, and VAERS staff do not give medical advice. 6. The National Vaccine Injury Compensation Program 
 
The [facility] Vaccine Injury Compensation Program (VICP) is a federal program that was created to compensate people who may have been injured by certain vaccines. Visit the VICP website at www.[facility].gov/vaccinecompensation or call 4-325.812.6397 to learn about the program and about filing a claim. There is a time limit to file a claim for compensation. 7. How can I learn more?  Ask your health care provider.  Call your local or state health department.  Contact the Centers for Disease Control and Prevention (CDC): 
- Call 5-404.139.4935 (7-499-BWI-INFO) or 
- Visit CDCs influenza website at www.cdc.gov/flu Vaccine Information Statement (Interim) Inactivated Influenza Vaccine 8/15/2019 
42 TRISHA Padilla 282NG-08 Department of Health and [facility] Centers for Disease Control and Prevention Office Use Only Wrist Tendinitis: Exercises Introduction Here are some examples of exercises for you to try. The exercises may be suggested for a condition or for rehabilitation. Start each exercise slowly. Ease off the exercises if you start to have pain. You will be told when to start these exercises and which ones will work best for you. How to do the exercises Wrist flexion and extension 1. Place your forearm on a table, with your hand and affected wrist extended beyond the table, palm down.  
2. Bend your wrist to move your hand upward and allow your hand to close into a fist, then lower your hand and allow your fingers to relax. Hold each position for about 6 seconds. 3. Repeat 8 to 12 times. Hand flips 1. While seated, place your forearm and affected wrist on your thigh, palm down. 2. Flip your hand over so the back of your hand rests on your thigh and your palm is up. Alternate between palm up and palm down while keeping your forearm on your thigh. 3. Repeat 8 to 12 times. Wrist radial and ulnar deviation 1. Hold your affected hand out in front of you, palm down. 2. Slowly bend your wrist as far as you can from side to side. Hold each position for about 6 seconds. 3. Repeat 8 to 12 times. Wrist extensor stretch 1. Extend the arm with the affected wrist in front of you and point your fingers toward the floor. 2. With your other hand, gently bend your wrist farther until you feel a mild to moderate stretch in your forearm. 3. Hold the stretch for at least 15 to 30 seconds. 4. Repeat 2 to 4 times. 5. When you can do this stretch with ease and no pain, repeat steps 1 through 4. But this time extend your affected arm in front of you and make a fist with your palm facing down. Then bend your wrist, pointing your fist toward the floor. Wrist flexor stretch 1. Extend the arm with the affected wrist in front of you with your palm facing away from your body. 2. Bend back your wrist, pointing your hand up toward the ceiling. 3. With your other hand, gently bend your wrist farther until you feel a mild to moderate stretch in your forearm. 4. Hold the stretch for at least 15 to 30 seconds. 5. Repeat 2 to 4 times. 6. Repeat steps 1 through 5, but this time extend your affected arm in front of you with your palm facing up. Then bend back your wrist, pointing your hand toward the floor. Follow-up care is a key part of your treatment and safety.  Be sure to make and go to all appointments, and call your doctor if you are having problems. It's also a good idea to know your test results and keep a list of the medicines you take. Where can you learn more? Go to http://www.gray.com/ Enter X351 in the search box to learn more about \"Wrist Tendinitis: Exercises. \" Current as of: March 2, 2020               Content Version: 12.6 © 5870-6170 [employer]. Care instructions adapted under license by [employer] (which disclaims liability or warranty for this information). If you have questions about a medical condition or this instruction, always ask your healthcare professional. [place] [address] any warranty or liability for your use of this information.

## 2020-10-13 LAB
ALBUMIN SERPL-MCNC: 4.4 G/DL (ref 3.8–4.8)
ALBUMIN/GLOB SERPL: 1.8 {RATIO} (ref 1.2–2.2)
ALP SERPL-CCNC: 114 IU/L (ref 39–117)
ALT SERPL-CCNC: 20 IU/L (ref 0–32)
AST SERPL-CCNC: 22 IU/L (ref 0–40)
BASOPHILS # BLD AUTO: 0.1 X10E3/UL (ref 0–0.2)
BASOPHILS NFR BLD AUTO: 1 %
BILIRUB SERPL-MCNC: 0.3 MG/DL (ref 0–1.2)
BUN SERPL-MCNC: 5 MG/DL (ref 6–24)
BUN/CREAT SERPL: 5 (ref 9–23)
CALCIUM SERPL-MCNC: 9.5 MG/DL (ref 8.7–10.2)
CHLORIDE SERPL-SCNC: 99 MMOL/L (ref 96–106)
CHOLEST SERPL-MCNC: 229 MG/DL (ref 100–199)
CO2 SERPL-SCNC: 29 MMOL/L (ref 20–29)
CREAT SERPL-MCNC: 0.95 MG/DL (ref 0.57–1)
EOSINOPHIL # BLD AUTO: 0.6 X10E3/UL (ref 0–0.4)
EOSINOPHIL NFR BLD AUTO: 9 %
ERYTHROCYTE [DISTWIDTH] IN BLOOD BY AUTOMATED COUNT: 12.7 % (ref 11.7–15.4)
GLOBULIN SER CALC-MCNC: 2.4 G/DL (ref 1.5–4.5)
GLUCOSE SERPL-MCNC: 79 MG/DL (ref 65–99)
HCT VFR BLD AUTO: 40 % (ref 34–46.6)
HDLC SERPL-MCNC: 60 MG/DL
HGB BLD-MCNC: 13.3 G/DL (ref 11.1–15.9)
IMM GRANULOCYTES # BLD AUTO: 0 X10E3/UL (ref 0–0.1)
IMM GRANULOCYTES NFR BLD AUTO: 0 %
INTERPRETATION, 910389: NORMAL
LDLC SERPL CALC-MCNC: 145 MG/DL (ref 0–99)
LYMPHOCYTES # BLD AUTO: 2.5 X10E3/UL (ref 0.7–3.1)
LYMPHOCYTES NFR BLD AUTO: 35 %
MCH RBC QN AUTO: 31.7 PG (ref 26.6–33)
MCHC RBC AUTO-ENTMCNC: 33.3 G/DL (ref 31.5–35.7)
MCV RBC AUTO: 96 FL (ref 79–97)
MONOCYTES # BLD AUTO: 0.4 X10E3/UL (ref 0.1–0.9)
MONOCYTES NFR BLD AUTO: 6 %
NEUTROPHILS # BLD AUTO: 3.4 X10E3/UL (ref 1.4–7)
NEUTROPHILS NFR BLD AUTO: 49 %
PLATELET # BLD AUTO: 247 X10E3/UL (ref 150–450)
POTASSIUM SERPL-SCNC: 3.8 MMOL/L (ref 3.5–5.2)
PROT SERPL-MCNC: 6.8 G/DL (ref 6–8.5)
RBC # BLD AUTO: 4.19 X10E6/UL (ref 3.77–5.28)
SODIUM SERPL-SCNC: 143 MMOL/L (ref 134–144)
T4 FREE SERPL-MCNC: 0.88 NG/DL (ref 0.82–1.77)
TRIGL SERPL-MCNC: 133 MG/DL (ref 0–149)
TSH SERPL DL<=0.005 MIU/L-ACNC: 3.28 UIU/ML (ref 0.45–4.5)
VLDLC SERPL CALC-MCNC: 24 MG/DL (ref 5–40)
WBC # BLD AUTO: 7.1 X10E3/UL (ref 3.4–10.8)

## 2020-10-13 NOTE — PROGRESS NOTES
HISTORY OF PRESENT ILLNESS  Cr Hancock is a 52 y.o. female. HPI  FU chronic med conditions:  Chronic neck and back pain, hypothyroidism, hypercholesterolemia, depression, allergies. Also C/O acute L wrist pain x 1 days. Works as  and had a long job to do on Sat. Due for labs and med refills. She has been followed by [facility] Dr Mellisa Perea but they do not prescribe her gabapentin. Also followed by Neurology for her migraines. Review of Systems   HENT: Positive for congestion. Musculoskeletal: Positive for back pain, joint pain and neck pain. Neurological: Positive for headaches. Physical Exam  Vitals signs and nursing note reviewed. Constitutional:       Appearance: Normal appearance. Cardiovascular:      Rate and Rhythm: Normal rate and regular rhythm. Heart sounds: Normal heart sounds. Pulmonary:      Effort: Pulmonary effort is normal.      Breath sounds: Normal breath sounds. Musculoskeletal:      Left wrist: She exhibits decreased range of motion, tenderness and swelling. Arms:    Neurological:      Mental Status: She is alert. ASSESSMENT and PLAN    ICD-10-CM ICD-9-CM    1. Chronic neck pain  M54.2 723.1 gabapentin (NEURONTIN) 800 mg tablet    G89.29 338.29    2. Intractable migraine without aura and with status migrainosus  G43.011 346.13    3. Needs flu shot  Z23 V04.81 INFLUENZA VIRUS VAC QUAD,SPLIT,PRESV FREE SYRINGE IM   4. Neck pain  M54.2 723.1 nortriptyline (PAMELOR) 25 mg capsule   5. Vitamin D deficiency  E55.9 268.9    6. Acquired hypothyroidism  E03.9 244.9 T4, FREE      TSH 3RD GENERATION      TSH 3RD GENERATION      T4, FREE   7. Hypercholesteremia  E78.00 272.0 LIPID PANEL      LIPID PANEL   8. Encounter for long-term current use of medication  Z79.899 V58.69 CBC WITH AUTOMATED DIFF      METABOLIC PANEL, COMPREHENSIVE      METABOLIC PANEL, COMPREHENSIVE      CBC WITH AUTOMATED DIFF   9.  Left wrist tendonitis  M77.8 727.05 ibuprofen (MOTRIN) 800 mg tablet     Labs drawn. Med refill ordered. Recommend wrist splint. Hold off aggravating activities for this week. Ibuprofen 800 mg PRN  FLU vaccine given.

## 2020-10-15 ENCOUNTER — TELEPHONE (OUTPATIENT)
Dept: FAMILY MEDICINE CLINIC | Age: 50
End: 2020-10-15

## 2020-10-15 DIAGNOSIS — E03.9 ACQUIRED HYPOTHYROIDISM: Primary | ICD-10-CM

## 2020-10-15 RX ORDER — LEVOTHYROXINE SODIUM 50 UG/1
50 TABLET ORAL
Qty: 90 TAB | Refills: 1 | Status: SHIPPED | OUTPATIENT
Start: 2020-10-15 | End: 2021-04-21

## 2020-10-15 NOTE — TELEPHONE ENCOUNTER
Increase dose Levothyroxine 50 mcg tab daily. Recheck in 3 months. Please advise pt that I  Made this change based on lab results. My Chart message sent about results.

## 2020-10-15 NOTE — PROGRESS NOTES
Labs show normal liver and kidney tests. Normal sugar. Cholesterol numbers are higher. Please follow low fat diet. Thyroid level is on the low side. I will increase dose of Levothyroxine to 50 mcg tab daily. Plan to repeat labs in 3 months.

## 2020-10-20 ENCOUNTER — APPOINTMENT (OUTPATIENT)
Dept: GENERAL RADIOLOGY | Age: 50
End: 2020-10-20
Attending: EMERGENCY MEDICINE
Payer: MEDICAID

## 2020-10-20 ENCOUNTER — HOSPITAL ENCOUNTER (EMERGENCY)
Age: 50
Discharge: HOME | End: 2020-10-20
Attending: EMERGENCY MEDICINE
Payer: MEDICAID

## 2020-10-20 VITALS
TEMPERATURE: 98 F | DIASTOLIC BLOOD PRESSURE: 86 MMHG | SYSTOLIC BLOOD PRESSURE: 106 MMHG | OXYGEN SATURATION: 97 % | HEART RATE: 87 BPM | RESPIRATION RATE: 17 BRPM

## 2020-10-20 DIAGNOSIS — R07.89 ATYPICAL CHEST PAIN: Primary | ICD-10-CM

## 2020-10-20 LAB
ALBUMIN SERPL-MCNC: 3.7 G/DL (ref 3.5–5)
ALBUMIN/GLOB SERPL: 1 {RATIO} (ref 1.1–2.2)
ALP SERPL-CCNC: 106 U/L (ref 45–117)
ALT SERPL-CCNC: 23 U/L (ref 12–78)
ANION GAP SERPL CALC-SCNC: 3 MMOL/L (ref 5–15)
AST SERPL-CCNC: 17 U/L (ref 15–37)
ATRIAL RATE: 89 BPM
BASOPHILS # BLD: 0.1 K/UL (ref 0–0.1)
BASOPHILS NFR BLD: 1 % (ref 0–1)
BILIRUB SERPL-MCNC: 0.3 MG/DL (ref 0.2–1)
BUN SERPL-MCNC: 6 MG/DL (ref 6–20)
BUN/CREAT SERPL: 5 (ref 12–20)
CALCIUM SERPL-MCNC: 9 MG/DL (ref 8.5–10.1)
CALCULATED P AXIS, ECG09: 36 DEGREES
CALCULATED R AXIS, ECG10: 51 DEGREES
CALCULATED T AXIS, ECG11: 48 DEGREES
CHLORIDE SERPL-SCNC: 105 MMOL/L (ref 97–108)
CO2 SERPL-SCNC: 32 MMOL/L (ref 21–32)
COMMENT, HOLDF: NORMAL
CREAT SERPL-MCNC: 1.11 MG/DL (ref 0.55–1.02)
D DIMER PPP FEU-MCNC: 0.6 MG/L FEU (ref 0–0.65)
DIAGNOSIS, 93000: NORMAL
DIFFERENTIAL METHOD BLD: ABNORMAL
EOSINOPHIL # BLD: 0.8 K/UL (ref 0–0.4)
EOSINOPHIL NFR BLD: 11 % (ref 0–7)
ERYTHROCYTE [DISTWIDTH] IN BLOOD BY AUTOMATED COUNT: 12.1 % (ref 11.5–14.5)
GLOBULIN SER CALC-MCNC: 3.7 G/DL (ref 2–4)
GLUCOSE SERPL-MCNC: 109 MG/DL (ref 65–100)
HCT VFR BLD AUTO: 41.2 % (ref 35–47)
HGB BLD-MCNC: 13.4 G/DL (ref 11.5–16)
IMM GRANULOCYTES # BLD AUTO: 0 K/UL (ref 0–0.04)
IMM GRANULOCYTES NFR BLD AUTO: 0 % (ref 0–0.5)
LYMPHOCYTES # BLD: 3.1 K/UL (ref 0.8–3.5)
LYMPHOCYTES NFR BLD: 43 % (ref 12–49)
MCH RBC QN AUTO: 30.9 PG (ref 26–34)
MCHC RBC AUTO-ENTMCNC: 32.5 G/DL (ref 30–36.5)
MCV RBC AUTO: 94.9 FL (ref 80–99)
MONOCYTES # BLD: 0.5 K/UL (ref 0–1)
MONOCYTES NFR BLD: 8 % (ref 5–13)
NEUTS SEG # BLD: 2.7 K/UL (ref 1.8–8)
NEUTS SEG NFR BLD: 38 % (ref 32–75)
NRBC # BLD: 0 K/UL (ref 0–0.01)
NRBC BLD-RTO: 0 PER 100 WBC
P-R INTERVAL, ECG05: 134 MS
PLATELET # BLD AUTO: 255 K/UL (ref 150–400)
PMV BLD AUTO: 11 FL (ref 8.9–12.9)
POTASSIUM SERPL-SCNC: 3.5 MMOL/L (ref 3.5–5.1)
PROT SERPL-MCNC: 7.4 G/DL (ref 6.4–8.2)
Q-T INTERVAL, ECG07: 352 MS
QRS DURATION, ECG06: 86 MS
QTC CALCULATION (BEZET), ECG08: 428 MS
RBC # BLD AUTO: 4.34 M/UL (ref 3.8–5.2)
SAMPLES BEING HELD,HOLD: NORMAL
SODIUM SERPL-SCNC: 140 MMOL/L (ref 136–145)
TROPONIN I SERPL-MCNC: <0.05 NG/ML
VENTRICULAR RATE, ECG03: 89 BPM
WBC # BLD AUTO: 7.1 K/UL (ref 3.6–11)

## 2020-10-20 PROCEDURE — 93005 ELECTROCARDIOGRAM TRACING: CPT

## 2020-10-20 PROCEDURE — 80053 COMPREHEN METABOLIC PANEL: CPT

## 2020-10-20 PROCEDURE — 85379 FIBRIN DEGRADATION QUANT: CPT

## 2020-10-20 PROCEDURE — 36415 COLL VENOUS BLD VENIPUNCTURE: CPT

## 2020-10-20 PROCEDURE — 99284 EMERGENCY DEPT VISIT MOD MDM: CPT

## 2020-10-20 PROCEDURE — 84484 ASSAY OF TROPONIN QUANT: CPT

## 2020-10-20 PROCEDURE — 71045 X-RAY EXAM CHEST 1 VIEW: CPT

## 2020-10-20 PROCEDURE — 85025 COMPLETE CBC W/AUTO DIFF WBC: CPT

## 2020-10-20 NOTE — ED TRIAGE NOTES
Pt reports chest heaviness that started last week that has been intermittent, today pt reports diffuse chest heaviness that extends to her left shoulder.  Pt reports that taking a deep breath causes pain across her chest.

## 2020-10-20 NOTE — ED PROVIDER NOTES
HPI   The patient is a 53-year-old white female who presents to the emergency room with acute onset of chest pain like a heaviness in the chest on the left precordial area radiates a little bit into the left shoulder it is dull but slightly pleuritic not sharp. She denies any OCPs or hormone therapy she is status post hysterectomy. She denies fever chills cough or other symptoms of Covid.   The pain was not exertional.  Past Medical History:   Diagnosis Date    Acquired hypothyroidism 5/20/2018    Cervicalgia     Chronic neck pain 4/3/2013    Hypercholesteremia 5/18/2018    Migraine with aura, without mention of intractable migraine without mention of status migrainosus     Moderate episode of recurrent major depressive disorder ([address] 11/2/2016       Past Surgical History:   Procedure Laterality Date    HX ABDOMINAL LAPAROSCOPY      Adhesions found     HX APPENDECTOMY      HX CERVICAL FUSION  2006    HX HYSTERECTOMY  01/2010         Family History:   Problem Relation Age of Onset    Hypertension Father     Breast Cancer Other         not sure of age       Social History     Socioeconomic History    Marital status:      Spouse name: Not on file    Number of children: Not on file    Years of education: Not on file    Highest education level: Not on file   Occupational History    Not on file   Social Needs    Financial resource strain: Not on file    Food insecurity     Worry: Not on file     Inability: Not on file   [employer] needs     Medical: Not on file     Non-medical: Not on file   Tobacco Use    Smoking status: Never Smoker    Smokeless tobacco: Never Used   Substance and Sexual Activity    Alcohol use: Not Currently     Alcohol/week: 0.0 standard drinks     Comment: rarely    Drug use: Yes     Types: Marijuana    Sexual activity: Yes     Partners: Male     Birth control/protection: Surgical   Lifestyle    Physical activity     Days per week: Not on file     Minutes per session: Not on file    Stress: Not on file   Relationships    Social connections     Talks on phone: Not on file     Gets together: Not on file     Attends Orthodox service: Not on file     Active member of club or organization: Not on file     Attends meetings of clubs or organizations: Not on file     Relationship status: Not on file    Intimate partner violence     Fear of current or ex partner: Not on file     Emotionally abused: Not on file     Physically abused: Not on file     Forced sexual activity: Not on file   Other Topics Concern    Not on file   Social History Narrative    Not on file         ALLERGIES: Latex; Erythromycin; and Vancomycin    Review of Systems   All other systems reviewed and are negative. Vitals:    10/20/20 1215 10/20/20 1229   BP: 128/79 108/86   Pulse: 92 91   Resp: 16 17   Temp:  98 °F (36.7 °C)   SpO2: 98% 98%            Physical Exam  Vitals signs and nursing note reviewed. Constitutional:       Appearance: She is well-developed. HENT:      Head: Normocephalic and atraumatic. Mouth/Throat:      Pharynx: No oropharyngeal exudate. Eyes:      General: No scleral icterus. Conjunctiva/sclera: Conjunctivae normal.   Neck:      Musculoskeletal: Neck supple. Thyroid: No thyromegaly. Cardiovascular:      Rate and Rhythm: Normal rate and regular rhythm. Heart sounds: Normal heart sounds. No murmur. No friction rub. No gallop. Pulmonary:      Effort: Pulmonary effort is normal. No respiratory distress. Breath sounds: Normal breath sounds. No stridor. No wheezing or rales. Abdominal:      General: Bowel sounds are normal.      Palpations: Abdomen is soft. Tenderness: There is no abdominal tenderness. There is no guarding or rebound. Musculoskeletal: Normal range of motion. Lymphadenopathy:      Cervical: No cervical adenopathy. Skin:     General: Skin is warm and dry.    Neurological:      Mental Status: She is alert and oriented to person, place, and time.           [facility]       Procedures

## 2020-10-20 NOTE — ED NOTES
The patient was discharged home by Dr. Esperanza Fontaine  in stable condition. The patient is alert and oriented, in no respiratory distress and discharge vital signs obtained. The patient's diagnosis, condition and treatment were explained. The patient expressed understanding. No prescriptions given/e-scribed to pharmacy. No work/school note given. A discharge plan has been developed. A  was not involved in the process. Aftercare instructions were given. Pt ambulatory out of the ED.

## 2020-10-21 DIAGNOSIS — G43.011 INTRACTABLE MIGRAINE WITHOUT AURA AND WITH STATUS MIGRAINOSUS: ICD-10-CM

## 2020-10-21 RX ORDER — ELETRIPTAN HYDROBROMIDE 40 MG/1
TABLET, FILM COATED ORAL
Qty: 9 TAB | Refills: 3 | Status: SHIPPED | OUTPATIENT
Start: 2020-10-21 | End: 2021-03-12

## 2020-10-23 DIAGNOSIS — M77.8 LEFT WRIST TENDONITIS: ICD-10-CM

## 2020-10-23 RX ORDER — IBUPROFEN 800 MG/1
800 TABLET ORAL
Qty: 30 TAB | Refills: 0 | Status: SHIPPED | OUTPATIENT
Start: 2020-10-23 | End: 2020-11-15

## 2020-10-26 DIAGNOSIS — K58.0 IRRITABLE BOWEL SYNDROME WITH DIARRHEA: ICD-10-CM

## 2020-10-26 RX ORDER — DICYCLOMINE HYDROCHLORIDE 10 MG/1
10 CAPSULE ORAL 3 TIMES DAILY
Qty: 90 CAP | Refills: 2 | Status: SHIPPED | OUTPATIENT
Start: 2020-10-26 | End: 2021-02-19

## 2020-10-29 ENCOUNTER — APPOINTMENT (OUTPATIENT)
Dept: CT IMAGING | Age: 50
End: 2020-10-29
Attending: EMERGENCY MEDICINE
Payer: MEDICAID

## 2020-10-29 ENCOUNTER — HOSPITAL ENCOUNTER (EMERGENCY)
Age: 50
Discharge: HOME | End: 2020-10-29
Attending: EMERGENCY MEDICINE
Payer: MEDICAID

## 2020-10-29 VITALS
RESPIRATION RATE: 14 BRPM | WEIGHT: 135 LBS | TEMPERATURE: 97.6 F | HEART RATE: 71 BPM | DIASTOLIC BLOOD PRESSURE: 67 MMHG | HEIGHT: 63 IN | SYSTOLIC BLOOD PRESSURE: 103 MMHG | BODY MASS INDEX: 23.92 KG/M2 | OXYGEN SATURATION: 99 %

## 2020-10-29 DIAGNOSIS — R42 VERTIGO: Primary | ICD-10-CM

## 2020-10-29 LAB
ANION GAP SERPL CALC-SCNC: 2 MMOL/L (ref 5–15)
BASOPHILS # BLD: 0.1 K/UL (ref 0–0.1)
BASOPHILS NFR BLD: 1 % (ref 0–1)
BUN SERPL-MCNC: 6 MG/DL (ref 6–20)
BUN/CREAT SERPL: 6 (ref 12–20)
CALCIUM SERPL-MCNC: 9.7 MG/DL (ref 8.5–10.1)
CHLORIDE SERPL-SCNC: 103 MMOL/L (ref 97–108)
CO2 SERPL-SCNC: 35 MMOL/L (ref 21–32)
CREAT SERPL-MCNC: 0.98 MG/DL (ref 0.55–1.02)
DIFFERENTIAL METHOD BLD: ABNORMAL
EOSINOPHIL # BLD: 0.6 K/UL (ref 0–0.4)
EOSINOPHIL NFR BLD: 6 % (ref 0–7)
ERYTHROCYTE [DISTWIDTH] IN BLOOD BY AUTOMATED COUNT: 12.3 % (ref 11.5–14.5)
GLUCOSE SERPL-MCNC: 97 MG/DL (ref 65–100)
HCT VFR BLD AUTO: 39.7 % (ref 35–47)
HGB BLD-MCNC: 13.2 G/DL (ref 11.5–16)
IMM GRANULOCYTES # BLD AUTO: 0 K/UL (ref 0–0.04)
IMM GRANULOCYTES NFR BLD AUTO: 0 % (ref 0–0.5)
LYMPHOCYTES # BLD: 2.6 K/UL (ref 0.8–3.5)
LYMPHOCYTES NFR BLD: 28 % (ref 12–49)
MCH RBC QN AUTO: 31.4 PG (ref 26–34)
MCHC RBC AUTO-ENTMCNC: 33.2 G/DL (ref 30–36.5)
MCV RBC AUTO: 94.3 FL (ref 80–99)
MONOCYTES # BLD: 0.6 K/UL (ref 0–1)
MONOCYTES NFR BLD: 7 % (ref 5–13)
NEUTS SEG # BLD: 5.4 K/UL (ref 1.8–8)
NEUTS SEG NFR BLD: 58 % (ref 32–75)
NRBC # BLD: 0 K/UL (ref 0–0.01)
NRBC BLD-RTO: 0 PER 100 WBC
PLATELET # BLD AUTO: 236 K/UL (ref 150–400)
PMV BLD AUTO: 11.3 FL (ref 8.9–12.9)
POTASSIUM SERPL-SCNC: 3.6 MMOL/L (ref 3.5–5.1)
RBC # BLD AUTO: 4.21 M/UL (ref 3.8–5.2)
SODIUM SERPL-SCNC: 140 MMOL/L (ref 136–145)
WBC # BLD AUTO: 9.3 K/UL (ref 3.6–11)

## 2020-10-29 PROCEDURE — 70450 CT HEAD/BRAIN W/O DYE: CPT

## 2020-10-29 PROCEDURE — 96374 THER/PROPH/DIAG INJ IV PUSH: CPT

## 2020-10-29 PROCEDURE — 85025 COMPLETE CBC W/AUTO DIFF WBC: CPT

## 2020-10-29 PROCEDURE — 36415 COLL VENOUS BLD VENIPUNCTURE: CPT

## 2020-10-29 PROCEDURE — 99284 EMERGENCY DEPT VISIT MOD MDM: CPT

## 2020-10-29 PROCEDURE — 74011250636 HC RX REV CODE- 250/636: Performed by: EMERGENCY MEDICINE

## 2020-10-29 PROCEDURE — 80048 BASIC METABOLIC PNL TOTAL CA: CPT

## 2020-10-29 RX ORDER — MECLIZINE HYDROCHLORIDE 25 MG/1
25 TABLET ORAL
Status: COMPLETED | OUTPATIENT
Start: 2020-10-29 | End: 2020-10-29

## 2020-10-29 RX ORDER — ONDANSETRON 2 MG/ML
4 INJECTION INTRAMUSCULAR; INTRAVENOUS
Status: COMPLETED | OUTPATIENT
Start: 2020-10-29 | End: 2020-10-29

## 2020-10-29 RX ORDER — MECLIZINE HYDROCHLORIDE 25 MG/1
25 TABLET ORAL
Qty: 30 TAB | Refills: 0 | Status: SHIPPED | OUTPATIENT
Start: 2020-10-29 | End: 2020-11-08

## 2020-10-29 RX ADMIN — ONDANSETRON 4 MG: 2 INJECTION INTRAMUSCULAR; INTRAVENOUS at 14:55

## 2020-10-29 RX ADMIN — SODIUM CHLORIDE 1000 ML: 900 INJECTION, SOLUTION INTRAVENOUS at 15:45

## 2020-10-29 RX ADMIN — MECLIZINE HYDROCHLORIDE 25 MG: 25 TABLET ORAL at 14:50

## 2020-10-29 NOTE — ED TRIAGE NOTES
Patient presents to treatment area via wheelchair. Patient states she awoke this morning feeling light headed and dizzy. Patient states the sensation is worse with movement of her head or position change. Also complains of nausea. Had a period of dizziness last week, but states that the episode was short lived.

## 2020-10-29 NOTE — ED NOTES
IV fluids completed. Patient ambulatory to restroom with one assist; gait steady. Readied for discharge.

## 2020-10-29 NOTE — ED PROVIDER NOTES
53-year-old female with a history of chronic headaches presents from home today with chief complaint of dizziness which is characterized by the room spinning. When she gets up she feels like she is also spinning the opposite direction. This has been going on since this morning. The sensations are exacerbated by movement particularly getting up. She had episodes similar to this a couple weeks ago which went away. She endorses headaches, which are chronic and unchanged. No fever cough or congestion. She feels nauseated without vomiting. No complaints of focal weakness. The history is provided by the patient. Dizziness   This is a new problem. Episode onset: This morning. The problem has not changed since onset. There was no focality noted. Pertinent negatives include no focal weakness, no loss of sensation, no loss of balance, no slurred speech, no speech difficulty, no memory loss, no movement disorder, no agitation, no visual change, no mental status change, no unresponsiveness and no disorientation. There has been no fever. Associated symptoms include headaches and nausea. Pertinent negatives include no shortness of breath, no chest pain, no vomiting, no altered mental status, no confusion, no bowel incontinence and no bladder incontinence.         Past Medical History:   Diagnosis Date    Acquired hypothyroidism 5/20/2018    Cervicalgia     Chronic neck pain 4/3/2013    Hypercholesteremia 5/18/2018    Migraine with aura, without mention of intractable migraine without mention of status migrainosus     Moderate episode of recurrent major depressive disorder ([facility] [address] 11/2/2016       Past Surgical History:   Procedure Laterality Date    HX ABDOMINAL LAPAROSCOPY      Adhesions found     HX APPENDECTOMY      HX CERVICAL FUSION  2006    HX HYSTERECTOMY  01/2010         Family History:   Problem Relation Age of Onset    Hypertension Father     Breast Cancer Other         not sure of age       Social History     Socioeconomic History    Marital status:      Spouse name: Not on file    Number of children: Not on file    Years of education: Not on file    Highest education level: Not on file   Occupational History    Not on file   Social Needs    Financial resource strain: Not on file    Food insecurity     Worry: Not on file     Inability: Not on file    Transportation needs     Medical: Not on file     Non-medical: Not on file   Tobacco Use    Smoking status: Never Smoker    Smokeless tobacco: Never Used   Substance and Sexual Activity    Alcohol use: Not Currently     Alcohol/week: 0.0 standard drinks     Comment: rarely    Drug use: Yes     Types: Marijuana    Sexual activity: Yes     Partners: Male     Birth control/protection: Surgical   Lifestyle    Physical activity     Days per week: Not on file     Minutes per session: Not on file    Stress: Not on file   Relationships    Social connections     Talks on phone: Not on file     Gets together: Not on file     Attends Baptism service: Not on file     Active member of club or organization: Not on file     Attends meetings of clubs or organizations: Not on file     Relationship status: Not on file    Intimate partner violence     Fear of current or ex partner: Not on file     Emotionally abused: Not on file     Physically abused: Not on file     Forced sexual activity: Not on file   Other Topics Concern    Not on file   Social History Narrative    Not on file         ALLERGIES: Latex; Erythromycin; and Vancomycin    Review of Systems   Constitutional: Negative for fatigue and fever. HENT: Negative for sneezing and sore throat. Respiratory: Negative for cough and shortness of breath. Cardiovascular: Negative for chest pain and leg swelling. Gastrointestinal: Positive for nausea. Negative for abdominal pain, bowel incontinence, diarrhea and vomiting.    Genitourinary: Negative for bladder incontinence, difficulty urinating and dysuria. Musculoskeletal: Negative for arthralgias and myalgias. Skin: Negative for color change and rash. Neurological: Positive for dizziness and headaches. Negative for focal weakness, speech difficulty, weakness and loss of balance. Psychiatric/Behavioral: Negative for agitation, behavioral problems, confusion and memory loss. Vitals:    10/29/20 1408   BP: 111/77   Pulse: 69   Resp: 16   Temp: 97.6 °F (36.4 °C)   SpO2: 100%   Weight: 61.2 kg (135 lb)   Height: 5' 3\" (1.6 m)            Physical Exam  Vitals signs and nursing note reviewed. Constitutional:       General: She is not in acute distress. Appearance: Normal appearance. She is normal weight. She is not ill-appearing, toxic-appearing or diaphoretic. HENT:      Head: Normocephalic and atraumatic. Nose: Nose normal.      Mouth/Throat:      Mouth: Mucous membranes are moist.      Pharynx: Oropharynx is clear. Eyes:      Extraocular Movements: Extraocular movements intact. Conjunctiva/sclera: Conjunctivae normal.      Pupils: Pupils are equal, round, and reactive to light. Neck:      Musculoskeletal: Normal range of motion and neck supple. Cardiovascular:      Rate and Rhythm: Normal rate and regular rhythm. Pulses: Normal pulses. Heart sounds: Normal heart sounds. Pulmonary:      Effort: Pulmonary effort is normal.      Breath sounds: Normal breath sounds. Abdominal:      General: There is no distension. Palpations: Abdomen is soft. There is no mass. Tenderness: There is no abdominal tenderness. There is no guarding or rebound. Musculoskeletal: Normal range of motion. General: No swelling, tenderness, deformity or signs of injury. Right lower leg: No edema. Left lower leg: No edema. Skin:     General: Skin is warm and dry. Neurological:      General: No focal deficit present. Mental Status: She is alert and oriented to person, place, and time.       GCS: GCS eye subscore is 4. GCS verbal subscore is 5. GCS motor subscore is 6. Comments: Mild rightward fatigable nystagmus, Imelda-Hallpike positive right greater than left. Psychiatric:         Mood and Affect: Mood normal.         Behavior: Behavior normal.          MDM  Number of Diagnoses or Management Options  Diagnosis management comments: 42-year-old female presents as above with signs and symptoms of peripheral vertigo. Plan to treat with meclizine as an outpatient and follow-up with primary care.        Amount and/or Complexity of Data Reviewed  Clinical lab tests: reviewed  Tests in the radiology section of CPT®: reviewed           Procedures

## 2020-10-29 NOTE — ED NOTES
Patient is left sidelying on the bed and in an active conversation on the phone. Does not appear to be in distress at this time.

## 2020-10-29 NOTE — ED NOTES
Needs frequent reminding to keep right arm straight so the IVF's can run in. Will ambulate her when finished and DC to home.

## 2020-11-05 ENCOUNTER — TELEPHONE (OUTPATIENT)
Dept: NEUROLOGY | Age: 50
End: 2020-11-05

## 2020-11-05 NOTE — TELEPHONE ENCOUNTER
Re: Eletriptan approved    Approval rec'd from [id number] via [address]: [id number]     Effective 11/05/20-11/05/21  PA # [id number]    [facility] notified of approval via CMM.

## 2020-11-14 DIAGNOSIS — M77.8 LEFT WRIST TENDONITIS: ICD-10-CM

## 2020-11-15 RX ORDER — IBUPROFEN 800 MG/1
800 TABLET ORAL
Qty: 30 TAB | Refills: 0 | Status: SHIPPED | OUTPATIENT
Start: 2020-11-15 | End: 2021-12-20

## 2020-11-17 DIAGNOSIS — G43.011 INTRACTABLE MIGRAINE WITHOUT AURA AND WITH STATUS MIGRAINOSUS: ICD-10-CM

## 2020-11-17 RX ORDER — GALCANEZUMAB 120 MG/ML
INJECTION, SOLUTION SUBCUTANEOUS
Qty: 1 SYRINGE | Refills: 3 | Status: SHIPPED | OUTPATIENT
Start: 2020-11-17 | End: 2021-03-12

## 2020-12-19 ENCOUNTER — HOSPITAL ENCOUNTER (OUTPATIENT)
Dept: PREADMISSION TESTING | Age: 50
Discharge: HOME | End: 2020-12-19
Payer: MEDICAID

## 2020-12-19 PROCEDURE — 87635 SARS-COV-2 COVID-19 AMP PRB: CPT

## 2020-12-20 LAB — SARS-COV-2, COV2NT: NOT DETECTED

## 2020-12-22 NOTE — DISCHARGE INSTRUCTIONS
Upper Extremity Surgery Discharge Instructions  Dr. Elian Smith    Please take the time to review the following instructions before you leave the hospital and use them as guidelines during your recovery from surgery. If you have any questions, you may contact my office at [phone]. Wound Care / Dressing Change    Do NOT remove your dressing or get them wet. Ozella Needy / Bathing    May bathe/shower as long as dressing/splint/cast is kept dry. Sling    You are not required to wear a sling and should do so only as needed for comfort. You may remove your sling once the block wears off, which may be anywhere from 8-48 hrs after surgery. Activity    Please begin using fingers immediately after surgery, working to improve motion of straightening and flexing your fingers several times per day. No lifting with your affected hand. Otherwise, you may proceed with activity as tolerated. No driving until you receive further notice otherwise. Ice and Elevation    Keep your hand elevated continuously for 48 hours after surgery using the pillow provided. Your hand/wrist should always be above the level of your heart. Sleep with your arm elevated to minimize swelling and discomfort. Continue ice consistently for 48 hours after surgery. After 48 hours, you should ice 3 times per day for 20 minutes at a time for the next 5 days. After 1 week from surgery, you may use ice as needed for pain. Diet    You may advance your regular diet as tolerated. Increase your clear liquid intake for the next 2-3 days. Medications  1. You will be given prescriptions for pain medication, and nausea when you are discharged from the hospital. Please use the medications as prescribed. Pain medications may cause constipation - over the counter Colace or Milk of Magnesia may be used as needed.  Other possible side effects of pain medications are dizziness, headache, nausea, vomiting, and urinary retention. Discontinue the pain medication if you develop itching, rash, shortness of breath, or difficulties swallowing. If these symptoms become severe or arent relieved by discontinuing the medication, you should seek immediate medical attention. 2. Refills of pain medication are authorized during office hours only (8AM - 5PM Monday through Friday)  3. If you pain medication prescribed at the time of surgery contains Tylenol/Acetaminophen, DO NOT TAKE additional Tylenol/Acetaminophen. Do not exceed 4000mg of Tylenol/Acetaminophen per day. 4. You may resume the medication you were taking prior to your surgery. Pain medication may change the effects of any antidepressant medication you may be taking. If you have any questions about possible interactions between your regular medication and the pain medication, you should consult the physician who prescribes your regular medications. 5. Do not drive until further notice. 6. You were prescribed a nausea medication. It is only necessary to fill this if you are experiencing nausea. Please call the office at [phone] if you have any increasing numbness or tingling, increasing drainage on your dressing, fever greater than 100.5 degrees F or pain not controlled by medications. If you are experiencing chest pain or shortness of breath, please alert your primary care physician immediately.

## 2020-12-23 ENCOUNTER — HOSPITAL ENCOUNTER (OUTPATIENT)
Age: 50
Setting detail: OUTPATIENT SURGERY
Discharge: HOME | End: 2020-12-23
Attending: ORTHOPAEDIC SURGERY | Admitting: ORTHOPAEDIC SURGERY
Payer: MEDICAID

## 2020-12-23 VITALS
BODY MASS INDEX: 23.21 KG/M2 | SYSTOLIC BLOOD PRESSURE: 127 MMHG | TEMPERATURE: 98.6 F | HEART RATE: 82 BPM | DIASTOLIC BLOOD PRESSURE: 76 MMHG | RESPIRATION RATE: 10 BRPM | HEIGHT: 63 IN | WEIGHT: 131 LBS | OXYGEN SATURATION: 99 %

## 2020-12-23 DIAGNOSIS — M65.4 TENDINITIS, DE QUERVAIN'S: Primary | ICD-10-CM

## 2020-12-23 PROCEDURE — 77030000032 HC CUF TRNQT ZIMM -B: Performed by: ORTHOPAEDIC SURGERY

## 2020-12-23 PROCEDURE — 77030002933 HC SUT MCRYL J&J -A: Performed by: ORTHOPAEDIC SURGERY

## 2020-12-23 PROCEDURE — 77030040361 HC SLV COMPR DVT MDII -B

## 2020-12-23 PROCEDURE — 77030018836 HC SOL IRR NACL ICUM -A: Performed by: ORTHOPAEDIC SURGERY

## 2020-12-23 PROCEDURE — 74011000250 HC RX REV CODE- 250: Performed by: ORTHOPAEDIC SURGERY

## 2020-12-23 PROCEDURE — 74011250637 HC RX REV CODE- 250/637: Performed by: ORTHOPAEDIC SURGERY

## 2020-12-23 PROCEDURE — 76010000138 HC OR TIME 0.5 TO 1 HR: Performed by: ORTHOPAEDIC SURGERY

## 2020-12-23 PROCEDURE — 77030040922 HC BLNKT HYPOTHRM STRY -A

## 2020-12-23 PROCEDURE — 77030040356 HC CORD BPLR FRCP COVD -A: Performed by: ORTHOPAEDIC SURGERY

## 2020-12-23 PROCEDURE — 77030031139 HC SUT VCRL2 J&J -A: Performed by: ORTHOPAEDIC SURGERY

## 2020-12-23 PROCEDURE — 76210000021 HC REC RM PH II 0.5 TO 1 HR: Performed by: ORTHOPAEDIC SURGERY

## 2020-12-23 PROCEDURE — 2709999900 HC NON-CHARGEABLE SUPPLY: Performed by: ORTHOPAEDIC SURGERY

## 2020-12-23 RX ORDER — SODIUM CHLORIDE 0.9 % (FLUSH) 0.9 %
5-40 SYRINGE (ML) INJECTION AS NEEDED
Status: DISCONTINUED | OUTPATIENT
Start: 2020-12-23 | End: 2020-12-23 | Stop reason: HOSPADM

## 2020-12-23 RX ORDER — ONDANSETRON 8 MG/1
8 TABLET, ORALLY DISINTEGRATING ORAL
Qty: 10 TAB | Refills: 2 | Status: SHIPPED
Start: 2020-12-23 | End: 2021-12-20

## 2020-12-23 RX ORDER — SODIUM CHLORIDE 0.9 % (FLUSH) 0.9 %
5-40 SYRINGE (ML) INJECTION EVERY 8 HOURS
Status: DISCONTINUED | OUTPATIENT
Start: 2020-12-23 | End: 2020-12-23 | Stop reason: HOSPADM

## 2020-12-23 RX ORDER — LIDOCAINE HYDROCHLORIDE 10 MG/ML
0.1 INJECTION, SOLUTION EPIDURAL; INFILTRATION; INTRACAUDAL; PERINEURAL AS NEEDED
Status: DISCONTINUED | OUTPATIENT
Start: 2020-12-23 | End: 2020-12-23 | Stop reason: HOSPADM

## 2020-12-23 RX ORDER — NALOXONE HYDROCHLORIDE 0.4 MG/ML
0.4 INJECTION, SOLUTION INTRAMUSCULAR; INTRAVENOUS; SUBCUTANEOUS
Status: DISCONTINUED | OUTPATIENT
Start: 2020-12-23 | End: 2020-12-23 | Stop reason: HOSPADM

## 2020-12-23 RX ORDER — TRAMADOL HYDROCHLORIDE 50 MG/1
50 TABLET ORAL
Status: DISCONTINUED | OUTPATIENT
Start: 2020-12-23 | End: 2020-12-23 | Stop reason: HOSPADM

## 2020-12-23 RX ORDER — TRAMADOL HYDROCHLORIDE 50 MG/1
50 TABLET ORAL
Qty: 10 TAB | Refills: 0 | Status: SHIPPED
Start: 2020-12-23 | End: 2020-12-28

## 2020-12-23 RX ORDER — SODIUM CHLORIDE, SODIUM LACTATE, POTASSIUM CHLORIDE, CALCIUM CHLORIDE 600; 310; 30; 20 MG/100ML; MG/100ML; MG/100ML; MG/100ML
125 INJECTION, SOLUTION INTRAVENOUS CONTINUOUS
Status: DISCONTINUED | OUTPATIENT
Start: 2020-12-23 | End: 2020-12-23 | Stop reason: HOSPADM

## 2020-12-23 RX ORDER — HYDROMORPHONE HYDROCHLORIDE 1 MG/ML
.25-1 INJECTION, SOLUTION INTRAMUSCULAR; INTRAVENOUS; SUBCUTANEOUS
Status: DISCONTINUED | OUTPATIENT
Start: 2020-12-23 | End: 2020-12-23 | Stop reason: HOSPADM

## 2020-12-23 RX ORDER — SODIUM CHLORIDE 9 MG/ML
25 INJECTION, SOLUTION INTRAVENOUS AS NEEDED
Status: DISCONTINUED | OUTPATIENT
Start: 2020-12-23 | End: 2020-12-23 | Stop reason: HOSPADM

## 2020-12-23 RX ORDER — FLUMAZENIL 0.1 MG/ML
0.2 INJECTION INTRAVENOUS
Status: DISCONTINUED | OUTPATIENT
Start: 2020-12-23 | End: 2020-12-23 | Stop reason: HOSPADM

## 2020-12-23 RX ADMIN — TRAMADOL HYDROCHLORIDE 50 MG: 50 TABLET, FILM COATED ORAL at 14:13

## 2020-12-23 NOTE — BRIEF OP NOTE
Brief Postoperative Note    Patient: Anastasiya Baig  YOB: 1970  MRN: [id number]    Date of Procedure: 12/23/2020     Pre-Op Diagnosis: Radial styloid tenosynovitis [M65.4]    Post-Op Diagnosis: Same as preoperative diagnosis. Procedure(s):  LEFT DEQUERVAINS RELEASE    Surgeon(s):  Sue Castorena MD    Surgical Assistant: Physician Assistant: Jessica Odonnell Anesthesia: Local     Estimated Blood Loss (mL): Minimal    Complications: None    Specimens: * No specimens in log *     Implants: * No implants in log *    Drains: * No LDAs found *    Findings: As above.      Electronically Signed by Caty Rudolph PA-C on 12/23/2020 at 1:34 PM

## 2020-12-23 NOTE — OP NOTES
Operative Note    PREOPERATIVE DIAGNOSIS: Left DeQuervain's tenosynovitis    POSTOPERATIVE DIAGNOSIS: Left DeQuervain's tenosynovitis    PROCEDURE: Left DeQuervain's release    SURGEON: Surgeon(s) and Role:     * Red Farmer MD - Primary     Assistant: Liz Pinzon PA-C    Explanation of Assistant: Assistant was needed for assistance with positioning, retraction, and maintenance of exposure during the surgery. ANESTHESIA: Local    FINDINGS: As above    SPECIMENS: None    ESTIMATED BLOOD LOSS: Scant    COMPLICATIONS: None    IMPLANTS: None    INDICATIONS: The patient is a 52 y.o.  female who presented with Left DeQuervain's tenosynovitis which has been unresponsive to nonoperative measures addressed in history and physical.  After discussion of risks, including risks of bleeding, infection, damage to surrounding structures, persistent pain, stiffness, and loss of function, risks of anesthesia, and other risks, the patient elected to proceed with the above procedure and signed informed operative consent. DESCRIPTION OF PROCEDURE: The patient was seen and identified in the preanesthesia care unit. The operative site was marked. Preoperative questions were invited and answered. Risks and benefits of the procedure were again reviewed. Then, a field block with Marcaine 0.5% and Lidocaine 1% with epinephrine was placed. The patient was then brought to the operative suite on a stretcher and transferred to the operating room table in the supine position. A well padded tourniquet was then placed high on the patient's operative extremity,  but was not inflated during the case. The patient was then prepped and draped in the usual sterile fashion. A timeout was completed confirming the appropriate site, side, and procedure. DVT prophylaxis was not needed intraoperatively secondary to the duration of the case.    A 2 cm longitudinal incision was made starting from the radial styloid and proceeding proximally. Superficial radial nerve was identified and protected. The interval between the first and second dorsal extensor retinaculum were identified. The distal edge of the first dorsal extensor compartment was exposed and a Willistine Flower was inserted. The extensor retinaculum was released from its dorsal edge using a scalpel blade. two slips of the abductor pollicis longus tendon were identified and released from their subcompartments. The extensor pollicis brevis tendon was also identified in the compartment. The septum in between the APL and EPB was completely released. Examination of the tendons demonstrated significant tendinosis with longitudinal tearing of the tendons. Active thumb motion was performed showing no tendon subluxation. Hemostasis was achieved using a bipolar. Wound was irrigated. Incision was closed with vicryl in the subcutaneous tissues and monocryl in the skin. Sterile dressing applied. Hand immobilized in a thumb spica splint. POSTOPERATIVE PLAN: Return in two weeks for wound check and then start thumb range of motion and strengthening exercises.

## 2020-12-23 NOTE — H&P
Orthopedic Admission History and Physical        NAME: Ayanna Stewart       :  1970       MRN:  [id number]      Subjective:     Patient is a 52 y.o. female who presents with history of left wrist DeQuervain's tenosynovitis. Presents today for surgical treatment. Patient Active Problem List    Diagnosis Date Noted    Tendinitis, de Quervain's 2020    Acquired hypothyroidism 2018    Vitamin D deficiency 2018    Hypercholesteremia 2018    Moderate episode of recurrent major depressive disorder ([facility] 2016    Anxiety 2016    Chronic back pain 2015    Spasm of muscle, back 10/23/2014    Tinnitus of both ears 2013    Chronic neck pain 2013    Migraine with aura 2013     Past Medical History:   Diagnosis Date    Acquired hypothyroidism 2018    Cervicalgia     Chronic neck pain 4/3/2013    Hypercholesteremia 2018    Migraine with aura, without mention of intractable migraine without mention of status migrainosus     Moderate episode of recurrent major depressive disorder ([facility] 2016      Past Surgical History:   Procedure Laterality Date    HX ABDOMINAL LAPAROSCOPY      Adhesions found     HX APPENDECTOMY      HX CERVICAL FUSION      HX HYSTERECTOMY  2010      Prior to Admission medications    Medication Sig Start Date End Date Taking? Authorizing Provider   Emgality Pen 120 mg/mL injection INJECT 1 ML SUBCUTANEOUSLY EVERY 30 DAYS 20   Jessica COSTA NP   ibuprofen (MOTRIN) 800 mg tablet TAKE 1 TAB BY MOUTH TWO (2) TIMES DAILY (AFTER MEALS). INDICATIONS: TENDONITIS    Charito Pelayo MD   dicyclomine (BENTYL) 10 mg capsule Take 1 Cap by mouth three (3) times daily.  Indications: irritable colon    Charito Pelayo MD   eletriptan (RELPAX) 40 mg tablet TAKE 1 AT ONSET AND MAY REPEAT IN 2 HOURS IF NECESSARY 10/21/20   Shelly Rivas NP   levothyroxine (SYNTHROID) 50 mcg tablet Take 1 Tab by mouth Daily (before breakfast). Indications: a condition with low thyroid hormone levels 22/01/93   Mitchell Winchester MD   gabapentin (NEURONTIN) 800 mg tablet Take 1 Tab by mouth three (3) times daily. Max Daily Amount: 2,400 mg. Indications: neuropathic pain 14/31/90   Mitchell Winchester MD   nortriptyline (PAMELOR) 25 mg capsule TAKE 1 TO 2 CAPSULES BY MOUTH IN THE EVENING AT BEDTIME 02/77/41   Mitchell Winchester MD   buPROPion XL (WELLBUTRIN XL) 150 mg tablet TAKE 1 TABLET BY MOUTH EVERY DAY IN THE MORNING  Patient taking differently: Take 150 mg by mouth every morning.  6/12/20   Maco Sanchez MD     Current Facility-Administered Medications   Medication Dose Route Frequency    lidocaine (PF) (XYLOCAINE) 10 mg/mL (1 %) injection 0.1 mL  0.1 mL SubCUTAneous PRN    lactated Ringers infusion  125 mL/hr IntraVENous CONTINUOUS    lactated ringers bolus infusion 1,000 mL  1,000 mL IntraVENous ONCE    sodium chloride (NS) flush 5-40 mL  5-40 mL IntraVENous Q8H    sodium chloride (NS) flush 5-40 mL  5-40 mL IntraVENous PRN    0.9% sodium chloride infusion 25 mL  25 mL IntraVENous PRN    naloxone (NARCAN) injection 0.4 mg  0.4 mg IntraVENous Multiple    flumazeniL (ROMAZICON) 0.1 mg/mL injection 0.2 mg  0.2 mg IntraVENous Multiple    lactated Ringers infusion  125 mL/hr IntraVENous CONTINUOUS    bupivacaine (PF) 0.5 % (5 mg/mL) 10 mL, lidocaine-EPINEPHrine 1 %-1:100,000 10 mL solution    PRN      Allergies   Allergen Reactions    Latex Rash    Erythromycin Nausea and Vomiting    Vancomycin Swelling      Social History     Tobacco Use    Smoking status: Never Smoker    Smokeless tobacco: Never Used   Substance Use Topics    Alcohol use: Not Currently     Alcohol/week: 0.0 standard drinks     Comment: rarely      Family History   Problem Relation Age of Onset    Hypertension Father     Breast Cancer Other         not sure of age        Review of Systems  A comprehensive review of systems was negative except for that written in the HPI. Objective:     Patient Vitals for the past 8 hrs:   BP Temp Pulse Resp SpO2 Height Weight   20 1326 112/74 -- 84 -- 100 % -- --   20 1321 114/67 -- 94 -- 99 % -- --   20 1316 108/72 -- 78 -- 100 % -- --   20 1311 109/68 -- 90 -- 100 % -- --   20 1308 124/75 -- 82 -- 100 % -- --   20 1301 109/64 -- 88 -- 100 % -- --   20 1255 110/70 -- 90 -- 100 % -- --   20 1234 118/75 98.7 °F (37.1 °C) 82 12 100 % 5' 3\" (1.6 m) 59.4 kg (131 lb)     Temp (24hrs), Av.7 °F (37.1 °C), Min:98.7 °F (37.1 °C), Max:98.7 °F (37.1 °C)      Physical Exam:  General appearance: alert, cooperative, no distress, appears stated age  Lungs: No use of accessory breathing muscles. Breathing unlabored. Cardiac: Regular rate. Abdomen: soft, non-tender, non-distended  Extremities: As per prior exam.     Labs: No results found for this or any previous visit (from the past 24 hour(s)). Assessment:   No medical contraindications to proceeding with planned surgery. Please see initial office note for full discussion of risks, benefits, and alternatives to surgery. Patient Active Problem List    Diagnosis Date Noted    Tendinitis, de Quervain's 2020    Acquired hypothyroidism 2018    Vitamin D deficiency 2018    Hypercholesteremia 2018    Moderate episode of recurrent major depressive disorder ([facility] 2016    Anxiety 2016    Chronic back pain 2015    Spasm of muscle, back 10/23/2014    Tinnitus of both ears 2013    Chronic neck pain 2013    Migraine with aura 2013         Plan:   Proceed with surgery  Pt. stable  Pt.  NPO x meds

## 2021-02-09 DIAGNOSIS — G89.29 CHRONIC NECK PAIN: ICD-10-CM

## 2021-02-09 DIAGNOSIS — M54.2 CHRONIC NECK PAIN: ICD-10-CM

## 2021-02-09 RX ORDER — GABAPENTIN 800 MG/1
800 TABLET ORAL 3 TIMES DAILY
Qty: 90 TAB | Refills: 2 | Status: SHIPPED | OUTPATIENT
Start: 2021-02-09 | End: 2021-06-26

## 2021-02-19 DIAGNOSIS — K58.0 IRRITABLE BOWEL SYNDROME WITH DIARRHEA: ICD-10-CM

## 2021-02-19 RX ORDER — DICYCLOMINE HYDROCHLORIDE 10 MG/1
CAPSULE ORAL
Qty: 90 CAP | Refills: 2 | Status: SHIPPED | OUTPATIENT
Start: 2021-02-19 | End: 2021-06-04

## 2021-03-11 DIAGNOSIS — G43.011 INTRACTABLE MIGRAINE WITHOUT AURA AND WITH STATUS MIGRAINOSUS: ICD-10-CM

## 2021-03-12 RX ORDER — GALCANEZUMAB 120 MG/ML
INJECTION, SOLUTION SUBCUTANEOUS
Qty: 1 SYRINGE | Refills: 3 | Status: SHIPPED | OUTPATIENT
Start: 2021-03-12 | End: 2021-07-06 | Stop reason: SDUPTHER

## 2021-03-12 RX ORDER — ELETRIPTAN HYDROBROMIDE 40 MG/1
TABLET, FILM COATED ORAL
Qty: 9 TAB | Refills: 3 | Status: SHIPPED | OUTPATIENT
Start: 2021-03-12 | End: 2021-07-06 | Stop reason: SDUPTHER

## 2021-04-21 DIAGNOSIS — E03.9 ACQUIRED HYPOTHYROIDISM: ICD-10-CM

## 2021-04-21 RX ORDER — LEVOTHYROXINE SODIUM 50 UG/1
TABLET ORAL
Qty: 90 TAB | Refills: 1 | Status: SHIPPED | OUTPATIENT
Start: 2021-04-21 | End: 2021-11-17

## 2021-05-02 DIAGNOSIS — M54.2 NECK PAIN: ICD-10-CM

## 2021-05-03 RX ORDER — NORTRIPTYLINE HYDROCHLORIDE 25 MG/1
CAPSULE ORAL
Qty: 60 CAP | Refills: 5 | Status: SHIPPED | OUTPATIENT
Start: 2021-05-03 | End: 2021-11-29

## 2021-06-04 DIAGNOSIS — K58.0 IRRITABLE BOWEL SYNDROME WITH DIARRHEA: ICD-10-CM

## 2021-06-04 RX ORDER — DICYCLOMINE HYDROCHLORIDE 10 MG/1
CAPSULE ORAL
Qty: 90 CAPSULE | Refills: 5 | Status: SHIPPED | OUTPATIENT
Start: 2021-06-04

## 2021-06-26 DIAGNOSIS — G89.29 CHRONIC NECK PAIN: ICD-10-CM

## 2021-06-26 DIAGNOSIS — M54.2 CHRONIC NECK PAIN: ICD-10-CM

## 2021-06-26 RX ORDER — GABAPENTIN 800 MG/1
800 TABLET ORAL 3 TIMES DAILY
Qty: 90 TABLET | Refills: 0 | Status: SHIPPED | OUTPATIENT
Start: 2021-06-26 | End: 2021-08-18 | Stop reason: DRUGHIGH

## 2021-07-11 DIAGNOSIS — G43.011 INTRACTABLE MIGRAINE WITHOUT AURA AND WITH STATUS MIGRAINOSUS: ICD-10-CM

## 2021-07-13 DIAGNOSIS — G43.011 INTRACTABLE MIGRAINE WITHOUT AURA AND WITH STATUS MIGRAINOSUS: ICD-10-CM

## 2021-07-13 RX ORDER — GALCANEZUMAB 120 MG/ML
INJECTION, SOLUTION SUBCUTANEOUS
Qty: 1 SYRINGE | Refills: 3 | Status: SHIPPED | OUTPATIENT
Start: 2021-07-13 | End: 2021-09-13 | Stop reason: SDUPTHER

## 2021-07-13 RX ORDER — ELETRIPTAN HYDROBROMIDE 40 MG/1
TABLET, FILM COATED ORAL
Qty: 9 TABLET | Refills: 3 | Status: SHIPPED | OUTPATIENT
Start: 2021-07-13 | End: 2021-09-13 | Stop reason: SDUPTHER

## 2021-07-13 RX ORDER — GALCANEZUMAB 120 MG/ML
INJECTION, SOLUTION SUBCUTANEOUS
Qty: 1 SYRINGE | Refills: 3 | OUTPATIENT
Start: 2021-07-13

## 2021-07-13 RX ORDER — ELETRIPTAN HYDROBROMIDE 40 MG/1
TABLET, FILM COATED ORAL
Qty: 9 TABLET | Refills: 3 | OUTPATIENT
Start: 2021-07-13

## 2021-08-18 ENCOUNTER — OFFICE VISIT (OUTPATIENT)
Dept: FAMILY MEDICINE CLINIC | Age: 51
End: 2021-08-18
Payer: MEDICAID

## 2021-08-18 VITALS
TEMPERATURE: 98.6 F | HEIGHT: 63 IN | DIASTOLIC BLOOD PRESSURE: 77 MMHG | RESPIRATION RATE: 18 BRPM | OXYGEN SATURATION: 98 % | SYSTOLIC BLOOD PRESSURE: 120 MMHG | BODY MASS INDEX: 25.52 KG/M2 | WEIGHT: 144 LBS | HEART RATE: 90 BPM

## 2021-08-18 DIAGNOSIS — G43.509 PERSISTENT MIGRAINE AURA WITHOUT CEREBRAL INFARCTION AND WITHOUT STATUS MIGRAINOSUS, NOT INTRACTABLE: ICD-10-CM

## 2021-08-18 DIAGNOSIS — Z79.899 ENCOUNTER FOR LONG-TERM CURRENT USE OF MEDICATION: ICD-10-CM

## 2021-08-18 DIAGNOSIS — Z11.59 NEED FOR HEPATITIS C SCREENING TEST: ICD-10-CM

## 2021-08-18 DIAGNOSIS — F41.9 ANXIETY: ICD-10-CM

## 2021-08-18 DIAGNOSIS — M54.2 CHRONIC NECK PAIN: ICD-10-CM

## 2021-08-18 DIAGNOSIS — E55.9 VITAMIN D DEFICIENCY: ICD-10-CM

## 2021-08-18 DIAGNOSIS — E03.9 ACQUIRED HYPOTHYROIDISM: Primary | ICD-10-CM

## 2021-08-18 DIAGNOSIS — Z12.31 ENCOUNTER FOR SCREENING MAMMOGRAM FOR MALIGNANT NEOPLASM OF BREAST: ICD-10-CM

## 2021-08-18 DIAGNOSIS — E78.00 HYPERCHOLESTEREMIA: ICD-10-CM

## 2021-08-18 DIAGNOSIS — G89.29 CHRONIC NECK PAIN: ICD-10-CM

## 2021-08-18 PROCEDURE — 99214 OFFICE O/P EST MOD 30 MIN: CPT | Performed by: FAMILY MEDICINE

## 2021-08-18 RX ORDER — GABAPENTIN 600 MG/1
600 TABLET ORAL 2 TIMES DAILY
Qty: 60 TABLET | Refills: 5 | Status: SHIPPED | OUTPATIENT
Start: 2021-08-18 | End: 2022-02-22

## 2021-08-18 RX ORDER — HYDROXYZINE PAMOATE 25 MG/1
25 CAPSULE ORAL
Qty: 30 CAPSULE | Refills: 0 | Status: SHIPPED | OUTPATIENT
Start: 2021-08-18 | End: 2021-12-20

## 2021-08-18 NOTE — PATIENT INSTRUCTIONS

## 2021-08-18 NOTE — PROGRESS NOTES
Identified pt with two pt identifiers(name and ). Chief Complaint   Patient presents with    Anxiety     would like to have medication to use PRN    Migraine    Labs     NPO since midnight        Health Maintenance Due   Topic    Hepatitis C Screening        Wt Readings from Last 3 Encounters:   21 144 lb (65.3 kg)   20 131 lb (59.4 kg)   10/29/20 135 lb (61.2 kg)     Temp Readings from Last 3 Encounters:   21 98.6 °F (37 °C) (Temporal)   20 98.6 °F (37 °C)   10/29/20 97.6 °F (36.4 °C)     BP Readings from Last 3 Encounters:   21 120/77   20 127/76   10/29/20 103/67     Pulse Readings from Last 3 Encounters:   21 90   20 82   10/29/20 71         Learning Assessment:  :     Learning Assessment 2021   PRIMARY LEARNER Patient Patient Patient Patient   HIGHEST LEVEL OF EDUCATION - PRIMARY LEARNER  DID NOT GRADUATE HIGH SCHOOL - DID NOT GRADUATE HIGH SCHOOL DID NOT GRADUATE HIGH SCHOOL   BARRIERS PRIMARY LEARNER NONE NONE - NONE   CO-LEARNER CAREGIVER No No - No   PRIMARY LANGUAGE ENGLISH ENGLISH ENGLISH ENGLISH   LEARNER PREFERENCE PRIMARY LISTENING READING DEMONSTRATION DEMONSTRATION     DEMONSTRATION LISTENING - -     - DEMONSTRATION - -   ANSWERED BY patient self patient patient    RELATIONSHIP SELF SELF SELF SELF       Depression Screening:  :     3 most recent PHQ Screens 2021   PHQ Not Done -   Little interest or pleasure in doing things Not at all   Feeling down, depressed, irritable, or hopeless Not at all   Total Score PHQ 2 0       Fall Risk Assessment:  :     Fall Risk Assessment, last 12 mths 3/6/2017   Able to walk? Yes   Fall in past 12 months? No       Abuse Screening:  :     Abuse Screening Questionnaire 2021 10/12/2020 3/7/2019 2018 3/6/2017 1/15/2016 2014   Do you ever feel afraid of your partner? N N N N N N N   Are you in a relationship with someone who physically or mentally threatens you? N N N N N N N   Is it safe for you to go home? Y Y Y Y Y Y Y           Coordination of Care Questionnaire:  :     1) Have you been to an emergency room, urgent care clinic since your last visit? no   Hospitalized since your last visit? yes             2) Have you seen or consulted any other health care providers outside of [address] [place] [address] since your last visit? no  (Include any pap smears or colon screenings in this section.)    3) Do you have an Advance Directive on file? no  Are you interested in receiving information about Advance Directives? no    Patient is accompanied by self. Reviewed record in preparation for visit and have obtained necessary documentation. Medication reconciliation up to date and corrected with patient at this time.

## 2021-08-19 NOTE — PROGRESS NOTES
HISTORY OF PRESENT ILLNESS  Chuy López is a 54 y.o. female. HPI  FU chronic med conditions:  Patient Active Problem List   Diagnosis Code    Migraine with aura G43. 109    Chronic neck pain M54.2, G89.29    Tinnitus of both ears H93.13    Spasm of muscle, back M62.830    Chronic back pain M54.9, G89.29    Moderate episode of recurrent major depressive disorder (HCC) F33.1    Anxiety F41.9    Vitamin D deficiency E55.9    Hypercholesteremia E78.00    Acquired hypothyroidism E03.9    Tendinitis, de Quervain's M65.4     She is due for labs. Pt requesting PRN med for anxiety. Current Outpatient Medications   Medication Sig Dispense Refill    hydrOXYzine pamoate (VISTARIL) 25 mg capsule Take 1 Capsule by mouth three (3) times daily as needed for Anxiety. Indications: anxious 30 Capsule 0    gabapentin (NEURONTIN) 600 mg tablet Take 1 Tablet by mouth two (2) times a day. Max Daily Amount: 1,200 mg. Indications: neuropathic pain 60 Tablet 5    eletriptan (RELPAX) 40 mg tablet TAKE 1 AT ONSET AND MAY REPEAT IN 2 HOURS IF NECESSARY 9 Tablet 3    Emgality Pen 120 mg/mL injection INJECT 1 ML SUBCUTANEOUSLY EVERY 30 DAYS 1 Syringe 3    dicyclomine (BENTYL) 10 mg capsule TAKE 1 CAPSULE BY MOUTH THREE TIMES A DAY FOR IRRITABLE COLON 90 Capsule 5    nortriptyline (PAMELOR) 25 mg capsule TAKE 1 TO 2 CAPSULES BY MOUTH IN THE EVENING AT BEDTIME 60 Cap 5    levothyroxine (SYNTHROID) 50 mcg tablet TAKE 1 TABLET BY MOUTH DAILY BEFORE BREAKFAST INDICATIONS:LOW THYROID HORMONE LEVELS 90 Tab 1    ondansetron (Zofran ODT) 8 mg disintegrating tablet Take 1 Tab by mouth every eight (8) hours as needed for Nausea. 10 Tab 2    ibuprofen (MOTRIN) 800 mg tablet TAKE 1 TAB BY MOUTH TWO (2) TIMES DAILY (AFTER MEALS).  INDICATIONS: TENDONITIS 30 Tab 0       ROS  Visit Vitals  /77 (BP 1 Location: Left upper arm, BP Patient Position: Sitting, BP Cuff Size: Adult)   Pulse 90   Temp 98.6 °F (37 °C) (Temporal)   Resp 18 Ht 5' 3\" (1.6 m)   Wt 144 lb (65.3 kg)   LMP 01/09/2010   SpO2 98%   BMI 25.51 kg/m²       Physical Exam  Vitals reviewed. Constitutional:       Appearance: Normal appearance. Cardiovascular:      Rate and Rhythm: Normal rate and regular rhythm. Heart sounds: Normal heart sounds. Pulmonary:      Effort: Pulmonary effort is normal.      Breath sounds: Normal breath sounds. Neurological:      Mental Status: She is alert. ASSESSMENT and PLAN    ICD-10-CM ICD-9-CM    1. Acquired hypothyroidism  E03.9 244.9 TSH 3RD GENERATION      T4, FREE      TSH 3RD GENERATION      T4, FREE   2. Persistent migraine aura without cerebral infarction and without status migrainosus, not intractable  G43.509 346.50    3. Encounter for long-term current use of medication  Z79.899 V58.69 CBC WITH AUTOMATED DIFF      METABOLIC PANEL, COMPREHENSIVE      CBC WITH AUTOMATED DIFF      METABOLIC PANEL, COMPREHENSIVE   4. Hypercholesteremia  E78.00 272.0 LIPID PANEL      LIPID PANEL   5. Vitamin D deficiency  E55.9 268.9 VITAMIN D, 25 HYDROXY      VITAMIN D, 25 HYDROXY   6. Need for hepatitis C screening test  Z11.59 V73.89 HEPATITIS C AB      HEPATITIS C AB   7. Anxiety  F41.9 300.00 hydrOXYzine pamoate (VISTARIL) 25 mg capsule   8. Chronic neck pain  M54.2 723.1 gabapentin (NEURONTIN) 600 mg tablet    G89.29 338.29    9. Encounter for screening mammogram for malignant neoplasm of breast  Z12.31 V76.12 VAZQUEZ MAMMO BI SCREENING INCL CAD     Order labs. Order mammogram  Refill medicines. Try Atarax PRN anxiety.

## 2021-08-20 LAB
25(OH)D3+25(OH)D2 SERPL-MCNC: 28.1 NG/ML (ref 30–100)
ALBUMIN SERPL-MCNC: 4.4 G/DL (ref 3.8–4.8)
ALBUMIN/GLOB SERPL: 1.6 {RATIO} (ref 1.2–2.2)
ALP SERPL-CCNC: 107 IU/L (ref 48–121)
ALT SERPL-CCNC: 11 IU/L (ref 0–32)
AST SERPL-CCNC: 16 IU/L (ref 0–40)
BASOPHILS # BLD AUTO: 0.1 X10E3/UL (ref 0–0.2)
BASOPHILS NFR BLD AUTO: 1 %
BILIRUB SERPL-MCNC: 0.3 MG/DL (ref 0–1.2)
BUN SERPL-MCNC: 3 MG/DL (ref 6–24)
BUN/CREAT SERPL: 4 (ref 9–23)
CALCIUM SERPL-MCNC: 10 MG/DL (ref 8.7–10.2)
CHLORIDE SERPL-SCNC: 101 MMOL/L (ref 96–106)
CHOLEST SERPL-MCNC: 262 MG/DL (ref 100–199)
CO2 SERPL-SCNC: 29 MMOL/L (ref 20–29)
CREAT SERPL-MCNC: 0.85 MG/DL (ref 0.57–1)
EOSINOPHIL # BLD AUTO: 0.5 X10E3/UL (ref 0–0.4)
EOSINOPHIL NFR BLD AUTO: 7 %
ERYTHROCYTE [DISTWIDTH] IN BLOOD BY AUTOMATED COUNT: 13.4 % (ref 11.7–15.4)
GLOBULIN SER CALC-MCNC: 2.8 G/DL (ref 1.5–4.5)
GLUCOSE SERPL-MCNC: 84 MG/DL (ref 65–99)
HCT VFR BLD AUTO: 38.6 % (ref 34–46.6)
HDLC SERPL-MCNC: 50 MG/DL
HGB BLD-MCNC: 13.4 G/DL (ref 11.1–15.9)
IMM GRANULOCYTES # BLD AUTO: 0 X10E3/UL (ref 0–0.1)
IMM GRANULOCYTES NFR BLD AUTO: 0 %
IMP & REVIEW OF LAB RESULTS: NORMAL
LDLC SERPL CALC-MCNC: 174 MG/DL (ref 0–99)
LYMPHOCYTES # BLD AUTO: 2.7 X10E3/UL (ref 0.7–3.1)
LYMPHOCYTES NFR BLD AUTO: 38 %
MCH RBC QN AUTO: 33.4 PG (ref 26.6–33)
MCHC RBC AUTO-ENTMCNC: 34.7 G/DL (ref 31.5–35.7)
MCV RBC AUTO: 96 FL (ref 79–97)
MONOCYTES # BLD AUTO: 0.5 X10E3/UL (ref 0.1–0.9)
MONOCYTES NFR BLD AUTO: 7 %
NEUTROPHILS # BLD AUTO: 3.3 X10E3/UL (ref 1.4–7)
NEUTROPHILS NFR BLD AUTO: 47 %
PLATELET # BLD AUTO: 274 X10E3/UL (ref 150–450)
POTASSIUM SERPL-SCNC: 4.1 MMOL/L (ref 3.5–5.2)
PROT SERPL-MCNC: 7.2 G/DL (ref 6–8.5)
RBC # BLD AUTO: 4.01 X10E6/UL (ref 3.77–5.28)
SODIUM SERPL-SCNC: 144 MMOL/L (ref 134–144)
T4 FREE SERPL-MCNC: 1.09 NG/DL (ref 0.82–1.77)
TRIGL SERPL-MCNC: 202 MG/DL (ref 0–149)
TSH SERPL DL<=0.005 MIU/L-ACNC: 1.59 UIU/ML (ref 0.45–4.5)
VLDLC SERPL CALC-MCNC: 38 MG/DL (ref 5–40)
WBC # BLD AUTO: 7.1 X10E3/UL (ref 3.4–10.8)

## 2021-08-24 ENCOUNTER — TELEPHONE (OUTPATIENT)
Dept: FAMILY MEDICINE CLINIC | Age: 51
End: 2021-08-24

## 2021-08-24 DIAGNOSIS — E78.00 HYPERCHOLESTEREMIA: Primary | ICD-10-CM

## 2021-08-24 RX ORDER — ROSUVASTATIN CALCIUM 10 MG/1
10 TABLET, COATED ORAL
Qty: 90 TABLET | Refills: 1 | Status: SHIPPED | OUTPATIENT
Start: 2021-08-24 | End: 2022-02-22

## 2021-08-25 NOTE — PROGRESS NOTES
Labs show very high cholesterol. I recommend starting a statin to lower cholesterol. I can send in prescription. Vit D is low. Take Vit D3 1,000 units daily. Normal blood counts. Normal kidney, sugar, and liver function. Normal thyroid level.

## 2021-09-13 DIAGNOSIS — G43.011 INTRACTABLE MIGRAINE WITHOUT AURA AND WITH STATUS MIGRAINOSUS: ICD-10-CM

## 2021-09-13 RX ORDER — ELETRIPTAN HYDROBROMIDE 40 MG/1
TABLET, FILM COATED ORAL
Qty: 9 TABLET | Refills: 1 | Status: SHIPPED | OUTPATIENT
Start: 2021-09-13 | End: 2021-11-04 | Stop reason: SDUPTHER

## 2021-09-13 RX ORDER — GALCANEZUMAB 120 MG/ML
INJECTION, SOLUTION SUBCUTANEOUS
Qty: 1 EACH | Refills: 1 | Status: SHIPPED | OUTPATIENT
Start: 2021-09-13 | End: 2021-11-04 | Stop reason: SDUPTHER

## 2021-10-18 ENCOUNTER — TELEPHONE (OUTPATIENT)
Dept: NEUROLOGY | Age: 51
End: 2021-10-18

## 2021-10-18 NOTE — TELEPHONE ENCOUNTER
RE RELPAX PER PLAN  F (Key: [address] [facility]) APPROVED VIA [facility] GINO   Duong: [id number]  EFFECTIVE PA Case: [id number], Status: Approved, Coverage Starts on: 10/18/2021 12:00:00 AM, Coverage Ends on: 10/18/2022 12:00:00 AM.    PA Requirement  Sumatriptan Succinate 100 Mg Tab, PA Not Required  Rizatriptan 10 Mg Tab, PA Not Required  Zolmitriptan 2.5 Mg Tab, PA Required  Eletriptan 40 Mg Tab, PA Required    RE [facility] APPROVED VIA ANTHE BY [facility](Key: [id number]) EFFECTIVE PA Case: [id number], Status: Approved, Coverage Starts on: 10/18/2021 12:00:00 AM, Coverage Ends on: 10/18/2022 12:00:00 AM.

## 2021-10-19 ENCOUNTER — HOSPITAL ENCOUNTER (OUTPATIENT)
Dept: MAMMOGRAPHY | Age: 51
Discharge: HOME | End: 2021-10-19
Attending: FAMILY MEDICINE
Payer: MEDICAID

## 2021-10-19 DIAGNOSIS — Z12.31 ENCOUNTER FOR SCREENING MAMMOGRAM FOR MALIGNANT NEOPLASM OF BREAST: ICD-10-CM

## 2021-10-19 PROCEDURE — 77067 SCR MAMMO BI INCL CAD: CPT

## 2021-10-20 ENCOUNTER — TELEPHONE (OUTPATIENT)
Dept: NEUROLOGY | Age: 51
End: 2021-10-20

## 2021-10-20 NOTE — TELEPHONE ENCOUNTER
----- Message from Angie Katz sent at 10/20/2021  8:17 AM EDT -----  Regarding: Kevin/Rx  Pt is requesting a Rx for Emgality and Relpax and she does not have any medications left. Pts number is [phone] and [facility] number is [phone]. She did leave a message in her my chart a few times and the pharmacy faxed a request.

## 2021-11-04 DIAGNOSIS — G43.011 INTRACTABLE MIGRAINE WITHOUT AURA AND WITH STATUS MIGRAINOSUS: ICD-10-CM

## 2021-11-08 RX ORDER — GALCANEZUMAB 120 MG/ML
INJECTION, SOLUTION SUBCUTANEOUS
Qty: 1 EACH | Refills: 1 | Status: SHIPPED | OUTPATIENT
Start: 2021-11-08 | End: 2021-12-20 | Stop reason: SDUPTHER

## 2021-11-08 RX ORDER — ELETRIPTAN HYDROBROMIDE 40 MG/1
TABLET, FILM COATED ORAL
Qty: 9 TABLET | Refills: 1 | Status: SHIPPED | OUTPATIENT
Start: 2021-11-08 | End: 2021-12-20 | Stop reason: SDUPTHER

## 2021-11-17 DIAGNOSIS — E03.9 ACQUIRED HYPOTHYROIDISM: ICD-10-CM

## 2021-11-17 RX ORDER — LEVOTHYROXINE SODIUM 50 UG/1
TABLET ORAL
Qty: 90 TABLET | Refills: 1 | Status: SHIPPED | OUTPATIENT
Start: 2021-11-17 | End: 2022-05-15

## 2021-11-29 DIAGNOSIS — M54.2 NECK PAIN: ICD-10-CM

## 2021-11-29 RX ORDER — NORTRIPTYLINE HYDROCHLORIDE 25 MG/1
CAPSULE ORAL
Qty: 60 CAPSULE | Refills: 5 | Status: SHIPPED | OUTPATIENT
Start: 2021-11-29 | End: 2022-05-30

## 2021-12-07 NOTE — PROGRESS NOTES
Reviewed record in preparation for visit and have necessary documentation  Pt did not bring medication to office visit for review  Information was given to pt on Advanced Directives, Living Will  opportunity was given for questions Moderna dose 1 and 2

## 2021-12-20 ENCOUNTER — OFFICE VISIT (OUTPATIENT)
Dept: NEUROLOGY | Age: 51
End: 2021-12-20
Payer: MEDICAID

## 2021-12-20 VITALS — HEART RATE: 97 BPM | SYSTOLIC BLOOD PRESSURE: 118 MMHG | DIASTOLIC BLOOD PRESSURE: 76 MMHG | OXYGEN SATURATION: 99 %

## 2021-12-20 DIAGNOSIS — G43.011 INTRACTABLE MIGRAINE WITHOUT AURA AND WITH STATUS MIGRAINOSUS: Primary | ICD-10-CM

## 2021-12-20 PROCEDURE — 99213 OFFICE O/P EST LOW 20 MIN: CPT | Performed by: NURSE PRACTITIONER

## 2021-12-20 RX ORDER — GALCANEZUMAB 120 MG/ML
INJECTION, SOLUTION SUBCUTANEOUS
Qty: 1 EACH | Refills: 5 | Status: SHIPPED | OUTPATIENT
Start: 2021-12-20 | End: 2022-07-20 | Stop reason: SDUPTHER

## 2021-12-20 RX ORDER — ELETRIPTAN HYDROBROMIDE 40 MG/1
TABLET, FILM COATED ORAL
Qty: 9 TABLET | Refills: 5 | Status: SHIPPED | OUTPATIENT
Start: 2021-12-20 | End: 2022-03-22 | Stop reason: SDUPTHER

## 2021-12-20 NOTE — PROGRESS NOTES
Pt said that everything has been going well  Pt said that in general she is doing better  [facility] that her neck is doing better  Pt can tell when her next emgality inj

## 2021-12-20 NOTE — PROGRESS NOTES
[address] [facility],[address]  [address]   [place] [address] [place] [address] [address]   [phone] Office   [phone] Fax           Date:  21     Name:  Leonardo Richter  :  1970  MRN:  [id number]     PCP:  Nicole James MD    Chief Complaint   Patient presents with    Follow-up     migraines/ neck pain     HISTORY OF PRESENT ILLNESS: Follow up for migraines. At her last office visit, she was doing better with the Topamax and Aimovig. Unfortunately, she did call regarding more persistent migraines following that visit and was switched to [facility]. At this point, she is using this monthly and is no longer taking the Topamax. She has been getting about 5-6 migraines per month for which she has been taking Relpax. This is much more effective than it was in the past as she no longer needs the ibuprofen in order to abort the migraines. She also finds that she does not need to redose as often either. She indicates that she is taking the abortive treatments as soon as she realizes that she is getting a migraine and this has worked out well. She continues with gabapentin and Pamelor for her chronic neck pain which when it acts up is a known trigger for the migraine. Recap from LOV:  Discussed headache management moving forward. Since her migraine pattern is unchanged from her previous visit, she will continue with Aimovig 140mg monthly. We will try to slowly wean her from the Topamax by 25mg per week. Initially, we will just try to wean down to 100mg. If she has not seen any change with the lower dose, then we will try to wean her off the last 100mg of this. The gabapentin and the Pameloare more for the neck pain which is stable.  Follow up in three months     Current Outpatient Medications   Medication Sig    nortriptyline (PAMELOR) 25 mg capsule TAKE 1 TO 2 CAPSULES BY MOUTH IN THE EVENING AT BEDTIME    levothyroxine (SYNTHROID) 50 mcg tablet TAKE 1 TABLET BY MOUTH DAILY BEFORE BREAKFAST INDICATIONS:LOW THYROID HORMONE LEVELS    galcanezumab-gnlm (Emgality Pen) 120 mg/mL injection INJECT 1 ML SUBCUTANEOUSLY EVERY 30 DAYS    eletriptan (RELPAX) 40 mg tablet may repeat in 2 hours if necessary    rosuvastatin (CRESTOR) 10 mg tablet Take 1 Tablet by mouth nightly. Repeat lipids in 3 months (Nov 24, 2021)  Indications: high cholesterol    gabapentin (NEURONTIN) 600 mg tablet Take 1 Tablet by mouth two (2) times a day. Max Daily Amount: 1,200 mg. Indications: neuropathic pain    dicyclomine (BENTYL) 10 mg capsule TAKE 1 CAPSULE BY MOUTH THREE TIMES A DAY FOR IRRITABLE COLON     No current facility-administered medications for this visit.      Allergies   Allergen Reactions    Latex Rash    Erythromycin Nausea and Vomiting    Vancomycin Swelling     Past Medical History:   Diagnosis Date    Acquired hypothyroidism 5/20/2018    Cervicalgia     Chronic neck pain 4/3/2013    Hypercholesteremia 5/18/2018    Migraine with aura, without mention of intractable migraine without mention of status migrainosus     Moderate episode of recurrent major depressive disorder ([facility] 11/2/2016     Past Surgical History:   Procedure Laterality Date    HX ABDOMINAL LAPAROSCOPY      Adhesions found     HX APPENDECTOMY      HX CERVICAL FUSION  2006    HX HYSTERECTOMY  01/2010     Social History     Socioeconomic History    Marital status:      Spouse name: Not on file    Number of children: Not on file    Years of education: Not on file    Highest education level: Not on file   Occupational History    Not on file   Tobacco Use    Smoking status: Never Smoker    Smokeless tobacco: Never Used   Vaping Use    Vaping Use: Never used   Substance and Sexual Activity    Alcohol use: Not Currently     Alcohol/week: 0.0 standard drinks     Comment: rarely    Drug use: Yes     Types: Marijuana    Sexual activity: Yes     Partners: Male     Birth control/protection: Surgical   Other Topics Concern    Not on file   Social History Narrative    Not on file     Social Determinants of Health     Financial Resource Strain:     Difficulty of Paying Living Expenses: Not on file   Food Insecurity:     Worried About Running Out of Food in the Last Year: Not on file    Rachel of Food in the Last Year: Not on file   Transportation Needs:     Lack of Transportation (Medical): Not on file    Lack of Transportation (Non-Medical): Not on file   Physical Activity:     Days of Exercise per Week: Not on file    Minutes of Exercise per Session: Not on file   Stress:     Feeling of Stress : Not on file   Social Connections:     Frequency of Communication with Friends and Family: Not on file    Frequency of Social Gatherings with Friends and Family: Not on file    Attends Faith Services: Not on file    Active Member of [address] [place] [address] [employer] or Organizations: Not on file    Attends Club or Organization Meetings: Not on file    Marital Status: Not on file   Intimate Partner Violence:     Fear of Current or Ex-Partner: Not on file    Emotionally Abused: Not on file    Physically Abused: Not on file    Sexually Abused: Not on file   Housing Stability:     Unable to Pay for Housing in the Last Year: Not on file    Number of [place] in the Last Year: Not on file    Unstable Housing in the Last Year: Not on file     Family History   Problem Relation Age of Onset    Hypertension Father     No Known Problems Mother     Breast Cancer Other         not sure of age       PHYSICAL EXAMINATION:    Visit Vitals  /76 (BP 1 Location: Left arm, BP Patient Position: Sitting, BP Cuff Size: Adult)   Pulse 97   SpO2 99%     General:  Well defined, nourished, and groomed individual in no acute distress. Neck: Supple, nontender, no bruits, no pain with resistance to active range of motion. Heart: Regular rate and rhythm, no murmurs, rub, or gallop.   Normal S1S2.  Lungs:  Clear to auscultation bilaterally with equal chest expansion, no cough, no wheeze  Musculoskeletal:  Extremities revealed no edema and had full range of motion of joints. Psych:  Good mood and bright affect    NEUROLOGICAL EXAMINATION:     Mental Status:   Alert and oriented to person, place, and time with recent and remote memory intact. Cranial Nerves:  I: smell Not tested   II: visual fields Full to confrontation   II: pupils Equal, round, reactive to light   II: optic disc No papilledema   III,VII: ptosis none   III,IV,VI: extraocular muscles  Full ROM   V: mastication normal   V: facial light touch sensation  normal   VII: facial muscle function   symmetric   VIII: hearing symmetric   IX: soft palate elevation  normal   XI: trapezius strength  5/5   XI: sternocleidomastoid strength 5/5   XI: neck flexion strength  5/5   XII: tongue  midline     Motor Examination: Normal tone, bulk, and strength, 5/5 muscle strength throughout. Coordination:  Finger to nose was normal.   No resting or intention tremor    Gait and Station:  Steady while walking. Normal arm swing. No pronator drift. No muscle wasting or fasiculations noted. Reflexes:  DTRs 2+ throughout. ASSESSMENT AND PLAN    ICD-10-CM ICD-9-CM    1. Intractable migraine without aura and with status migrainosus  G43.011 346.13 galcanezumab-gnlm (Emgality Pen) 120 mg/mL injection      eletriptan (RELPAX) 40 mg tablet     With the switch from Amidate to Emgality, the migraines have been significantly more manageable. She will continue with Emgality monthly as previously prescribed. She can continue with Relpax for acute abortive therapy. As long as she continues to do well, I will plan to see her back in 6 months. Akila Hardy Stains

## 2022-02-22 DIAGNOSIS — G89.29 CHRONIC NECK PAIN: ICD-10-CM

## 2022-02-22 DIAGNOSIS — M54.2 CHRONIC NECK PAIN: ICD-10-CM

## 2022-02-22 RX ORDER — ROSUVASTATIN CALCIUM 10 MG/1
TABLET, COATED ORAL
Qty: 90 TABLET | Refills: 1 | Status: SHIPPED | OUTPATIENT
Start: 2022-02-22 | End: 2022-08-26 | Stop reason: SDUPTHER

## 2022-02-22 RX ORDER — GABAPENTIN 600 MG/1
TABLET ORAL
Qty: 60 TABLET | Refills: 5 | Status: SHIPPED | OUTPATIENT
Start: 2022-02-22 | End: 2022-08-26 | Stop reason: SDUPTHER

## 2022-03-07 ENCOUNTER — DOCUMENTATION ONLY (OUTPATIENT)
Dept: NEUROLOGY | Age: 52
End: 2022-03-07

## 2022-03-07 ENCOUNTER — PATIENT MESSAGE (OUTPATIENT)
Dept: NEUROLOGY | Age: 52
End: 2022-03-07

## 2022-03-18 PROBLEM — E55.9 VITAMIN D DEFICIENCY: Status: ACTIVE | Noted: 2018-05-18

## 2022-03-19 PROBLEM — E03.9 ACQUIRED HYPOTHYROIDISM: Status: ACTIVE | Noted: 2018-05-20

## 2022-03-19 PROBLEM — M65.4 TENDINITIS, DE QUERVAIN'S: Status: ACTIVE | Noted: 2020-12-23

## 2022-03-19 PROBLEM — E78.00 HYPERCHOLESTEREMIA: Status: ACTIVE | Noted: 2018-05-18

## 2022-03-22 DIAGNOSIS — G43.011 INTRACTABLE MIGRAINE WITHOUT AURA AND WITH STATUS MIGRAINOSUS: ICD-10-CM

## 2022-03-22 RX ORDER — ELETRIPTAN HYDROBROMIDE 40 MG/1
TABLET, FILM COATED ORAL
Qty: 9 TABLET | Refills: 5 | Status: SHIPPED | OUTPATIENT
Start: 2022-03-22 | End: 2022-06-29 | Stop reason: ALTCHOICE

## 2022-05-15 DIAGNOSIS — E03.9 ACQUIRED HYPOTHYROIDISM: ICD-10-CM

## 2022-05-15 RX ORDER — LEVOTHYROXINE SODIUM 50 UG/1
50 TABLET ORAL
Qty: 90 TABLET | Refills: 0 | Status: SHIPPED | OUTPATIENT
Start: 2022-05-15 | End: 2022-08-26 | Stop reason: SDUPTHER

## 2022-05-16 NOTE — TELEPHONE ENCOUNTER
Called pt 5/16/22 at 9:23 am and pt will call back or schedule via my chart for June or July currently driving

## 2022-05-26 DIAGNOSIS — G43.509 PERSISTENT MIGRAINE AURA WITHOUT CEREBRAL INFARCTION AND WITHOUT STATUS MIGRAINOSUS, NOT INTRACTABLE: ICD-10-CM

## 2022-05-26 RX ORDER — SUMATRIPTAN 100 MG/1
TABLET, FILM COATED ORAL
Qty: 9 TABLET | Refills: 5 | Status: SHIPPED | OUTPATIENT
Start: 2022-05-26 | End: 2022-07-20 | Stop reason: SDUPTHER

## 2022-05-30 DIAGNOSIS — M54.2 NECK PAIN: ICD-10-CM

## 2022-05-30 RX ORDER — NORTRIPTYLINE HYDROCHLORIDE 25 MG/1
CAPSULE ORAL
Qty: 60 CAPSULE | Refills: 5 | Status: SHIPPED | OUTPATIENT
Start: 2022-05-30

## 2022-06-15 ENCOUNTER — TELEPHONE (OUTPATIENT)
Dept: FAMILY MEDICINE CLINIC | Age: 52
End: 2022-06-15

## 2022-06-15 NOTE — TELEPHONE ENCOUNTER
Called patient to get more information and patient stated that she is just very stressed out and needs medication for her nerves. She said that its her father n law and that Dr. Juan Perry is aware of the problems going on and that he is also a patient. She said that she tried to call and get an appointment but we did not have any. I explained that we did have appointment but that it may be a few weeks out, and that I could transfer her up front to make that appointment and send a message over to Dr. Juan Perry to let her know what is going on. She stated that she did not want to make an appointment at all, to just send this message over to Dr. Juan Perry. I advised her that it had been a while since she had been seen and we may need an office visit for new medication to be called in for her. She still denied to schedule an appointment. Thank you.

## 2022-06-15 NOTE — TELEPHONE ENCOUNTER
----- Message from Rufus Pearson sent at 6/15/2022 10:16 AM EDT -----  Regarding: My nerves are shot!!!!  Good morning ,      I tried to get an appointment with you and there are none. I have been dealing with all this Lizbeth Nipple for so long, it's affecting my health. My IBS has been terrible the bentil helps with cramping a little. My hands are shaking like never before. It has to be do to him because if I'm calm the shaking lessens or stops and if I have to talk to doctors or rehab people or [facility] or whomever  or even just talking with kenji about him, it starts all over again. I also feel like I'm gonna throw up. During all this I think I have a sinus infection,  I was waiting because I thought it was allergies but it's not it's been weeks  now. Is there anything you can do???            [place]

## 2022-06-29 ENCOUNTER — OFFICE VISIT (OUTPATIENT)
Dept: FAMILY MEDICINE CLINIC | Age: 52
End: 2022-06-29
Payer: MEDICAID

## 2022-06-29 ENCOUNTER — APPOINTMENT (OUTPATIENT)
Dept: FAMILY MEDICINE CLINIC | Age: 52
End: 2022-06-29

## 2022-06-29 VITALS
RESPIRATION RATE: 20 BRPM | BODY MASS INDEX: 22.32 KG/M2 | TEMPERATURE: 98 F | HEART RATE: 113 BPM | SYSTOLIC BLOOD PRESSURE: 122 MMHG | DIASTOLIC BLOOD PRESSURE: 64 MMHG | WEIGHT: 126 LBS | OXYGEN SATURATION: 98 % | HEIGHT: 63 IN

## 2022-06-29 DIAGNOSIS — G43.011 INTRACTABLE MIGRAINE WITHOUT AURA AND WITH STATUS MIGRAINOSUS: ICD-10-CM

## 2022-06-29 DIAGNOSIS — E55.9 VITAMIN D DEFICIENCY: ICD-10-CM

## 2022-06-29 DIAGNOSIS — E03.9 ACQUIRED HYPOTHYROIDISM: ICD-10-CM

## 2022-06-29 DIAGNOSIS — J01.90 SUBACUTE SINUSITIS, UNSPECIFIED LOCATION: ICD-10-CM

## 2022-06-29 DIAGNOSIS — K58.0 IRRITABLE BOWEL SYNDROME WITH DIARRHEA: ICD-10-CM

## 2022-06-29 DIAGNOSIS — F43.9 STRESS AT HOME: ICD-10-CM

## 2022-06-29 DIAGNOSIS — Z79.899 ENCOUNTER FOR LONG-TERM CURRENT USE OF MEDICATION: ICD-10-CM

## 2022-06-29 DIAGNOSIS — E78.00 HYPERCHOLESTEREMIA: Primary | ICD-10-CM

## 2022-06-29 PROBLEM — Z90.710 STATUS POST HYSTERECTOMY: Status: ACTIVE | Noted: 2022-06-29

## 2022-06-29 PROCEDURE — 99214 OFFICE O/P EST MOD 30 MIN: CPT | Performed by: FAMILY MEDICINE

## 2022-06-29 RX ORDER — SERTRALINE HYDROCHLORIDE 50 MG/1
50 TABLET, FILM COATED ORAL DAILY
Qty: 90 TABLET | Refills: 1 | Status: SHIPPED | OUTPATIENT
Start: 2022-06-29

## 2022-06-29 RX ORDER — METHYLPREDNISOLONE 4 MG/1
TABLET ORAL
Qty: 1 DOSE PACK | Refills: 0 | Status: SHIPPED | OUTPATIENT
Start: 2022-06-29 | End: 2022-07-20 | Stop reason: ALTCHOICE

## 2022-06-29 RX ORDER — AZITHROMYCIN 250 MG/1
TABLET, FILM COATED ORAL
Qty: 6 TABLET | Refills: 0 | Status: SHIPPED | OUTPATIENT
Start: 2022-06-29 | End: 2022-07-07 | Stop reason: SDUPTHER

## 2022-06-29 NOTE — PATIENT INSTRUCTIONS
Irritable Bowel Syndrome: Care Instructions  Your Care Instructions  Irritable bowel syndrome, or IBS, is a problem with the intestines that causes belly pain, bloating, gas, constipation, and diarrhea. The cause of IBS is not well known. IBS can last for many years, but it does not get worse over time or lead to serious disease. Most people can control their symptoms by changing their diet and reducing stress. Follow-up care is a key part of your treatment and safety. Be sure to make and go to all appointments, and call your doctor if you are having problems. It's also a good idea to know your test results and keep a list of the medicines you take. How can you care for yourself at home? · To reduce diarrhea, limit or avoid:  ? Alcohol. ? Caffeine, which is found in coffee, tea, kishore, and chocolate. ? Nicotine from smoking or chewing tobacco.  ? Gas-producing foods, such as beans, broccoli, cabbage, or apples. ? Dairy products that contain lactose (milk sugar), such as ice cream or milk. ? Foods and drinks high in sugar, especially fruit juice, soda, candy, and other packaged sweets (such as cookies). ? Foods high in fat, including rodney, sausage, butter, oils, and anything deep-fried. ? Sorbitol and xylitol. These are artificial sweeteners found in some sugarless candies and chewing gum. · To reduce constipation:  ? Slowly increase the amount of fiber you eat. For some people who have IBS, eating more fiber may make some symptoms worse, including bloating. Adding fiber slowly may help you avoid these problems. ? Include fruits, vegetables, beans, and whole grains in your diet each day. These foods are high in fiber. ? Drink plenty of fluids. If you have kidney, heart, or liver disease and have to limit fluids, talk with your doctor before you increase the amount of fluids you drink. ? Get some exercise every day. Build up slowly to 30 to 60 minutes a day on 5 or more days of the week.   ? Take a fiber supplement, such as Citrucel or Metamucil, every day if needed. Read and follow all instructions on the label. ? Schedule time each day for a bowel movement. Having a daily routine may help. Take your time and do not strain when having a bowel movement. · Keep a daily diary of what you eat and what symptoms you have. This may help find foods that cause you problems. · Eat slowly. Try to make mealtime relaxing. · Find ways to reduce stress. When should you call for help? Call your doctor now or seek immediate medical care if:    · Your pain is different than usual or occurs with fever.     · You lose weight without trying, or you lose your appetite and you do not know why.     · Your symptoms often wake you from sleep.     · Your stools are black and tarlike or have streaks of blood. Watch closely for changes in your health, and be sure to contact your doctor if:    · Your IBS symptoms get worse or begin to disrupt your day-to-day life.     · You become more tired than usual.     · Your home treatment stops working. Where can you learn more? Go to http://www.gray.com/  Enter Y447 in the search box to learn more about \"Irritable Bowel Syndrome: Care Instructions. \"  Current as of: September 8, 2021               Content Version: 13.2  © 2006-2022 [employer], [employer]. Care instructions adapted under license by [employer] (which disclaims liability or warranty for this information). If you have questions about a medical condition or this instruction, always ask your healthcare professional. [place] [address] any warranty or liability for your use of this information.

## 2022-06-29 NOTE — PROGRESS NOTES
Identified pt with two pt identifiers(name and ). Chief Complaint   Patient presents with    Medication Refill    Abdominal Pain     Would like Zofran 8mg    Labs     Fasting        Health Maintenance Due   Topic    Hepatitis C Screening     Shingrix Vaccine Age 50> (1 of 2)       Wt Readings from Last 3 Encounters:   22 126 lb (57.2 kg)   21 144 lb (65.3 kg)   20 131 lb (59.4 kg)     Temp Readings from Last 3 Encounters:   22 98 °F (36.7 °C) (Temporal)   21 98.6 °F (37 °C) (Temporal)   20 98.6 °F (37 °C)     BP Readings from Last 3 Encounters:   22 122/64   21 118/76   21 120/77     Pulse Readings from Last 3 Encounters:   22 (!) 113   21 97   21 90         Learning Assessment:  :     Learning Assessment 2021   PRIMARY LEARNER Patient Patient Patient Patient   HIGHEST LEVEL OF EDUCATION - PRIMARY LEARNER  DID NOT GRADUATE HIGH SCHOOL - DID NOT GRADUATE HIGH SCHOOL DID NOT GRADUATE HIGH SCHOOL   BARRIERS PRIMARY LEARNER NONE NONE - NONE   CO-LEARNER CAREGIVER No No - No   PRIMARY LANGUAGE ENGLISH ENGLISH ENGLISH ENGLISH   LEARNER PREFERENCE PRIMARY LISTENING READING DEMONSTRATION DEMONSTRATION     DEMONSTRATION LISTENING - -     - DEMONSTRATION - -   ANSWERED BY patient self patient patient    RELATIONSHIP SELF SELF SELF SELF       Depression Screening:  :     3 most recent PHQ Screens 2022   PHQ Not Done -   Little interest or pleasure in doing things Not at all   Feeling down, depressed, irritable, or hopeless Not at all   Total Score PHQ 2 0       Fall Risk Assessment:  :     Fall Risk Assessment, last 12 mths 3/6/2017   Able to walk? Yes   Fall in past 12 months? No       Abuse Screening:  :     Abuse Screening Questionnaire 2022 2021 10/12/2020 3/7/2019 2018 3/6/2017 1/15/2016   Do you ever feel afraid of your partner?  N N N N N N N   Are you in a relationship with someone who physically or mentally threatens you? N N N N N N N   Is it safe for you to go home? Y Y Y Y Y Y Y       Coordination of Care Questionnaire:  :     1) Have you been to an emergency room, urgent care clinic since your last visit? no   Hospitalized since your last visit? no             2) Have you seen or consulted any other health care providers outside of [address] [place] [address] since your last visit? no  (Include any pap smears or colon screenings in this section.)    3) Do you have an Advance Directive on file? no  Are you interested in receiving information about Advance Directives? yes    Patient is accompanied by N/A I have received verbal consent from Torsten Thomas to discuss any/all medical information while they are present in the room. 4.  For patients aged 39-70: Has the patient had a colonoscopy / FIT/ Cologuard? Yes      If the patient is female:    5. For patients aged 41-77: Has the patient had a mammogram within the past 2 years? Yes - no Care Gap present      6. For patients aged 21-65: Has the patient had a pap smear?  No

## 2022-06-29 NOTE — PROGRESS NOTES
Subjective:     Janeth Wolf is a 50 y.o. female who presents for follow up of hyperlipidemia and hypothyroidism. Diet and Lifestyle: generally follows a low fat low cholesterol diet, generally follows a low sodium diet, nonsmoker  Home BP Monitoring: is not measured at home    Cardiovascular ROS: taking medications as instructed, no medication side effects noted, no TIA's, no chest pain on exertion, no dyspnea on exertion, no swelling of ankles. New concerns: stress over helping mother in law. Father in law remains at rehab. Unable to manage living at home. Working outside of home. Has IBS diarrhea due to stress. Sinus drainage and pressure x few weeks. Discolored mucus now. No fever. Has tried Allegra, Flonase NS. Patient Active Problem List    Diagnosis Date Noted    Status post hysterectomy 06/29/2022    Tendinitis, de Quervain's 12/23/2020    Acquired hypothyroidism 05/20/2018    Vitamin D deficiency 05/18/2018    Hypercholesteremia 05/18/2018    Mod[facility] episode of recurrent major depressive disorder [facility] 11/02/2016    Anxiety 11/02/2016    Chronic back pain 11/23/2015    Spasm of muscle, back 10/23/2014    Tinnitus of both ears 12/30/2013    Chronic neck pain 04/03/2013    Migraine with aura 01/11/2013     Current Outpatient Medications   Medication Sig Dispense Refill    sertraline (ZOLOFT) 50 mg tablet Take 1 Tablet by mouth daily. Indications: anxiety 90 Tablet 1    azithromycin (ZITHROMAX) 250 mg tablet Take 2 tablets today, then take 1 tablet daily 6 Tablet 0    methylPREDNISolone (MEDROL DOSEPACK) 4 mg tablet As directed 1 Dose Pack 0    nortriptyline (PAMELOR) 25 mg capsule TAKE 1 TO 2 CAPSULES BY MOUTH IN THE EVENING AT BEDTIME 60 Capsule 5    SUMAtriptan (IMITREX) 100 mg tablet Take one po at onset of migraine. May repeat dose x1 if needed after two hours.  Max of two doses in 24 hours 9 Tablet 5    levothyroxine (SYNTHROID) 50 mcg tablet Take 1 Tablet by mouth Daily (before breakfast). Need follow up visit and labs. 90 Tablet 0    rosuvastatin (CRESTOR) 10 mg tablet TAKE 1 TABLET BY MOUTH NIGHTLY. REPEAT LIPIDS IN 3 MONTHS (NOV 24, 2021) HIGH CHOLESTEROL 90 Tablet 1    gabapentin (NEURONTIN) 600 mg tablet TAKE 1 TABLET BY MOUTH TWO (2) TIMES A DAY. MAX DAILY: 1,200 MG. 60 Tablet 5    galcanezumab-gnlm (Emgality Pen) 120 mg/mL injection INJECT 1 ML SUBCUTANEOUSLY EVERY 30 DAYS 1 Each 5    dicyclomine (BENTYL) 10 mg capsule TAKE 1 CAPSULE BY MOUTH THREE TIMES A DAY FOR IRRITABLE COLON 90 Capsule 5     Allergies   Allergen Reactions    Latex Rash    Erythromycin Nausea and Vomiting    Vancomycin Swelling     Past Medical History:   Diagnosis Date    Acquired hypothyroidism 5/20/2018    Cervicalgia     Chronic neck pain 4/3/2013    Hypercholesteremia 5/18/2018    Migraine with aura, without mention of intractable migraine without mention of status migrainosus     Moderate episode of recurrent major depressive disorder ([facility] 11/2/2016     Past Surgical History:   Procedure Laterality Date    HX ABDOMINAL LAPAROSCOPY      Adhesions found     HX APPENDECTOMY      HX CERVICAL FUSION  2006    HX HYSTERECTOMY  01/2010     Family History   Problem Relation Age of Onset    Hypertension Father     No Known Problems Mother     Breast Cancer Other         not sure of age     Social History     Tobacco Use    Smoking status: Never Smoker    Smokeless tobacco: Never Used   Substance Use Topics    Alcohol use: Not Currently     Alcohol/week: 0.0 standard drinks     Comment: rarely             Review of Systems, additional:  Pertinent items are noted in HPI.     Objective:     Visit Vitals  /64 (BP 1 Location: Left arm, BP Patient Position: Sitting, BP Cuff Size: Adult)   Pulse (!) 113   Temp 98 °F (36.7 °C) (Temporal)   Resp 20   Ht 5' 3\" (1.6 m)   Wt 126 lb (57.2 kg)   LMP 01/09/2010   SpO2 98%   BMI 22.32 kg/m²     Appearance: alert, well appearing, and in no distress and normal appearing weight. General exam: CVS exam BP noted to be well controlled today in office, S1, S2 normal, no gallop, no murmur, chest clear, no JVD, no HSM, no edema. Lab review: orders written for new lab studies as appropriate; see orders. Assessment/Plan:     . Memo Case ICD-10-CM ICD-9-CM    1. Hypercholesteremia  E78.00 272.0 LIPID PANEL      LIPID PANEL   2. Acquired hypothyroidism  E03.9 244.9 TSH 3RD GENERATION      T4, FREE      TSH 3RD GENERATION      T4, FREE   3. Encounter for long-term current use of medication  Z79.899 V58.69 CBC WITH AUTOMATED DIFF      METABOLIC PANEL, COMPREHENSIVE      CBC WITH AUTOMATED DIFF      METABOLIC PANEL, COMPREHENSIVE   4. Intractable migraine without aura and with status migrainosus  G43.011 346.13    5. Vitamin D deficiency  E55.9 268.9 VITAMIN D, 25 HYDROXY      VITAMIN D, 25 HYDROXY   6. Irritable bowel syndrome with diarrhea  K58.0 564.1    7. Stress at home  F43.9 V61.9 sertraline (ZOLOFT) 50 mg tablet   8. Subacute sinusitis, unspecified location  J01.90 461.9 azithromycin (ZITHROMAX) 250 mg tablet      methylPREDNISolone (MEDROL DOSEPACK) 4 mg tablet     Start back on Zoloft 50 mg for anxiety.   Order Z Juliano and Medrol pack  Order labs

## 2022-07-01 LAB
25(OH)D3+25(OH)D2 SERPL-MCNC: 17.6 NG/ML (ref 30–100)
ALBUMIN SERPL-MCNC: 4.5 G/DL (ref 3.8–4.8)
ALBUMIN/GLOB SERPL: 1.7 {RATIO} (ref 1.2–2.2)
ALP SERPL-CCNC: 116 IU/L (ref 44–121)
ALT SERPL-CCNC: 19 IU/L (ref 0–32)
AST SERPL-CCNC: 23 IU/L (ref 0–40)
BASOPHILS # BLD AUTO: 0.1 X10E3/UL (ref 0–0.2)
BASOPHILS NFR BLD AUTO: 1 %
BILIRUB SERPL-MCNC: 0.2 MG/DL (ref 0–1.2)
BUN SERPL-MCNC: 4 MG/DL (ref 6–24)
BUN/CREAT SERPL: 4 (ref 9–23)
CALCIUM SERPL-MCNC: 9.5 MG/DL (ref 8.7–10.2)
CHLORIDE SERPL-SCNC: 99 MMOL/L (ref 96–106)
CHOLEST SERPL-MCNC: 135 MG/DL (ref 100–199)
CO2 SERPL-SCNC: 28 MMOL/L (ref 20–29)
CREAT SERPL-MCNC: 0.96 MG/DL (ref 0.57–1)
EGFR: 72 ML/MIN/1.73
EOSINOPHIL # BLD AUTO: 1 X10E3/UL (ref 0–0.4)
EOSINOPHIL NFR BLD AUTO: 15 %
ERYTHROCYTE [DISTWIDTH] IN BLOOD BY AUTOMATED COUNT: 13.1 % (ref 11.7–15.4)
GLOBULIN SER CALC-MCNC: 2.6 G/DL (ref 1.5–4.5)
GLUCOSE SERPL-MCNC: 91 MG/DL (ref 65–99)
HCT VFR BLD AUTO: 43.2 % (ref 34–46.6)
HDLC SERPL-MCNC: 63 MG/DL
HGB BLD-MCNC: 13.6 G/DL (ref 11.1–15.9)
IMM GRANULOCYTES # BLD AUTO: 0 X10E3/UL (ref 0–0.1)
IMM GRANULOCYTES NFR BLD AUTO: 0 %
IMP & REVIEW OF LAB RESULTS: NORMAL
LDLC SERPL CALC-MCNC: 56 MG/DL (ref 0–99)
LYMPHOCYTES # BLD AUTO: 1.9 X10E3/UL (ref 0.7–3.1)
LYMPHOCYTES NFR BLD AUTO: 29 %
MCH RBC QN AUTO: 31.3 PG (ref 26.6–33)
MCHC RBC AUTO-ENTMCNC: 31.5 G/DL (ref 31.5–35.7)
MCV RBC AUTO: 99 FL (ref 79–97)
MONOCYTES # BLD AUTO: 0.5 X10E3/UL (ref 0.1–0.9)
MONOCYTES NFR BLD AUTO: 8 %
NEUTROPHILS # BLD AUTO: 3.1 X10E3/UL (ref 1.4–7)
NEUTROPHILS NFR BLD AUTO: 47 %
PLATELET # BLD AUTO: 320 X10E3/UL (ref 150–450)
POTASSIUM SERPL-SCNC: 4.4 MMOL/L (ref 3.5–5.2)
PROT SERPL-MCNC: 7.1 G/DL (ref 6–8.5)
RBC # BLD AUTO: 4.35 X10E6/UL (ref 3.77–5.28)
SODIUM SERPL-SCNC: 142 MMOL/L (ref 134–144)
T4 FREE SERPL-MCNC: 1.22 NG/DL (ref 0.82–1.77)
TRIGL SERPL-MCNC: 80 MG/DL (ref 0–149)
TSH SERPL DL<=0.005 MIU/L-ACNC: 1.97 UIU/ML (ref 0.45–4.5)
VLDLC SERPL CALC-MCNC: 16 MG/DL (ref 5–40)
WBC # BLD AUTO: 6.5 X10E3/UL (ref 3.4–10.8)

## 2022-07-07 ENCOUNTER — TELEPHONE (OUTPATIENT)
Dept: FAMILY MEDICINE CLINIC | Age: 52
End: 2022-07-07

## 2022-07-07 DIAGNOSIS — E55.9 VITAMIN D DEFICIENCY: Primary | ICD-10-CM

## 2022-07-07 DIAGNOSIS — J01.90 SUBACUTE SINUSITIS, UNSPECIFIED LOCATION: ICD-10-CM

## 2022-07-07 PROBLEM — M65.4 TENDINITIS, DE QUERVAIN'S: Status: RESOLVED | Noted: 2020-12-23 | Resolved: 2022-07-07

## 2022-07-07 RX ORDER — ERGOCALCIFEROL 1.25 MG/1
50000 CAPSULE ORAL
Qty: 5 CAPSULE | Refills: 5 | Status: SHIPPED | OUTPATIENT
Start: 2022-07-07 | End: 2022-09-23

## 2022-07-07 RX ORDER — AZITHROMYCIN 250 MG/1
TABLET, FILM COATED ORAL
Qty: 6 TABLET | Refills: 0 | Status: SHIPPED | OUTPATIENT
Start: 2022-07-07 | End: 2022-07-12

## 2022-07-07 NOTE — TELEPHONE ENCOUNTER
----- Message from [facility]S \[facility] [place] [facility]\"" sent at 7/7/2022  4:17 PM EDT -----  Regarding: FW: Antibiotic     ----- Message -----  From: Agustín Grant  Sent: 7/7/2022   4:16 PM EDT  To: [facility] Nurse Roger  Subject: Antibiotic                                       I finished both the zpack and the medrol pack. I was feeling much better but now i feel like its tryng to come back or it didn't get it all gone. It has been nine days now, I know how the zpack works for 10 days even though you only have to take it for 5. Which is why I prefer that. It also doesn't hurt my tummy too much. Most do but it's the IBS that causes the trouble! I didn't know if I could repeat the zpac or if I need something different,  I could probably handle something twice a day but not three times. Also I don't have any nausea  med.

## 2022-07-07 NOTE — PROGRESS NOTES
Labs look good except for very low Vit D level. I will order prescription Vit D supplement to take 1 capsule a week. Great cholesterol numbers! Continue on Rosuvastatin. Good thyroid level.

## 2022-07-15 ENCOUNTER — TELEPHONE (OUTPATIENT)
Dept: FAMILY MEDICINE CLINIC | Age: 52
End: 2022-07-15

## 2022-07-15 RX ORDER — ONDANSETRON 8 MG/1
8 TABLET, ORALLY DISINTEGRATING ORAL
Qty: 30 TABLET | Refills: 0 | Status: SHIPPED | OUTPATIENT
Start: 2022-07-15

## 2022-07-15 NOTE — TELEPHONE ENCOUNTER
----- Message from Zander Wright sent at 7/15/2022  3:16 PM EDT -----  Regarding: FW: Seb Muse    ----- Message -----  From: Jose Sharpe  Sent: 7/15/2022   3:10 PM EDT  To: [facility] Nurse Pool  Subject: Seb Muse                                        I really need some nausea medicine.  My IBS is bad because of stress the bentil helps a little with the cramping but not the nausea

## 2022-07-20 ENCOUNTER — VIRTUAL VISIT (OUTPATIENT)
Dept: NEUROLOGY | Age: 52
End: 2022-07-20
Payer: MEDICAID

## 2022-07-20 DIAGNOSIS — G43.509 PERSISTENT MIGRAINE AURA WITHOUT CEREBRAL INFARCTION AND WITHOUT STATUS MIGRAINOSUS, NOT INTRACTABLE: ICD-10-CM

## 2022-07-20 DIAGNOSIS — G43.011 INTRACTABLE MIGRAINE WITHOUT AURA AND WITH STATUS MIGRAINOSUS: ICD-10-CM

## 2022-07-20 PROCEDURE — 99213 OFFICE O/P EST LOW 20 MIN: CPT | Performed by: NURSE PRACTITIONER

## 2022-07-20 RX ORDER — GALCANEZUMAB 120 MG/ML
INJECTION, SOLUTION SUBCUTANEOUS
Qty: 1 EACH | Refills: 5 | Status: SHIPPED | OUTPATIENT
Start: 2022-07-20

## 2022-07-20 RX ORDER — SUMATRIPTAN 100 MG/1
TABLET, FILM COATED ORAL
Qty: 9 TABLET | Refills: 5 | Status: SHIPPED | OUTPATIENT
Start: 2022-07-20

## 2022-07-20 NOTE — PROGRESS NOTES
[address] [place] [address],[address]  [address]   [address] [place] [address] [place] [phone] Office   [phone] Fax           Date:  22     Name:  Chica Mccall  :  1970  MRN:  [id number]     PCP:  Aydee Kenny MD    Cb Morelos is a 50 y.o. female who was seen by synchronous (real-time) audio-video technology on 2022 for Migraine    Subjective:   Relpax made her muscles sore the next day. Since she started the [facility], the Imitrex works well. Migraines are mainly triggered by weather. She is not waiting like she used to. 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 Recap from LOV:  With the switch from    to [facility], the migraines have been significantly more manageable. She will continue with Emgality monthly as previously prescribed. She can continue with Relpax for acute abortive therapy. As long as she continues to do well, I will plan to see her back in 6 months. Current Outpatient Medications   Medication Sig    ondansetron (ZOFRAN ODT) 8 mg disintegrating tablet Take 1 Tablet by mouth every eight (8) hours as needed for Nausea.    ergocalciferol (ERGOCALCIFEROL) 1,250 mcg (50,000 unit) capsule Take 1 Capsule by mouth every seven (7) days. sertraline (ZOLOFT) 50 mg tablet Take 1 Tablet by mouth daily. Indications: anxiety    nortriptyline (PAMELOR) 25 mg capsule TAKE 1 TO 2 CAPSULES BY MOUTH IN THE EVENING AT BEDTIME    SUMAtriptan (IMITREX) 100 mg tablet Take one po at onset of migraine. May repeat dose x1 if needed after two hours. Max of two doses in 24 hours    levothyroxine (SYNTHROID) 50 mcg tablet Take 1 Tablet by mouth Daily (before breakfast). Need follow up visit and labs. rosuvastatin (CRESTOR) 10 mg tablet TAKE 1 TABLET BY MOUTH NIGHTLY.  REPEAT LIPIDS IN 3 MONTHS (2021) HIGH CHOLESTEROL    gabapentin (NEURONTIN) 600 mg tablet TAKE 1 TABLET BY MOUTH TWO (2) TIMES A DAY. MAX DAILY: 1,200 MG.    galcanezumab-gnlm (Emgality Pen) 120 mg/mL injection INJECT 1 ML SUBCUTANEOUSLY EVERY 30 DAYS    dicyclomine (BENTYL) 10 mg capsule TAKE 1 CAPSULE BY MOUTH THREE TIMES A DAY FOR IRRITABLE COLON     No current facility-administered medications for this visit. Allergies   Allergen Reactions    Latex Rash    Erythromycin Nausea and Vomiting    Vancomycin Swelling      Past Medical History:   Diagnosis Date    Acquired hypothyroidism 5/20/2018    Cervicalgia     Chronic neck pain 4/3/2013    Hypercholesteremia 5/18/2018    Migraine with aura, without mention of intractable migraine without mention of status migrainosus     Moderate episode of recurrent major depressive disorder ([facility] [address] 11/2/2016     Past Surgical History:   Procedure Laterality Date    HX ABDOMINAL LAPAROSCOPY      Adhesions found     HX APPENDECTOMY      HX CERVICAL FUSION  2006    HX HYSTERECTOMY  01/2010      reports that she has never smoked. She has never used smokeless tobacco. She reports that she does not currently use alcohol. She reports current drug use. Drug: Marijuana. family history includes Breast Cancer in an other family member; Hypertension in her father; No Known Problems in her mother. ROS    Objective:   No flowsheet data found. General:  Well defined, nourished, and groomed individual in no acute distress. Psych:  Good mood and bright affect    NEUROLOGICAL EXAMINATION:     Mental Status:   Alert and oriented to person, place, and time with recent and remote memory intact. Attention span and concentration are normal. Speech is fluent with a full fund of knowledge.       Cranial Nerves:  I: smell Not tested   II: visual fields Not assessed   II: pupils Equal, round, reactive to light   II: optic disc Not assessed   III,VII: ptosis none   III,IV,VI: extraocular muscles  Full ROM   V: mastication normal   V: facial light touch sensation  Not assessed   VII: facial muscle function   symmetric   VIII: hearing symmetric   IX: soft palate elevation  normal   XI: trapezius strength  Not assessed   XI: sternocleidomastoid strength Not assessed   XI: neck flexion strength  Not assessed   XII: tongue  midline     Motor Examination: Normal tone and bulk. Strength was not assessed      Sensory exam:  Not assessed     Coordination:  No resting or intention tremor    Gait and Station:  No muscle wasting or fasiculations noted. Reflexes:  Not assessed    Assessment & Plan:       ICD-10-CM ICD-9-CM    1. Persistent migraine aura without cerebral infarction and without status migrainosus, not intractable  G43.509 346.50 SUMAtriptan (IMITREX) 100 mg tablet      2. Intractable migraine without aura and with status migrainosus  G43.011 346.13 galcanezumab-gnlm (Emgality Pen) 120 mg/mL injection        Migraine headaches are well managed on present therapy of Emgality with noted improvement in efficacy of the Imitrex for acute management since starting this therapy. Will continue with Emgality 120mg SC monthly and Imitrex as needed. Follow up in six months or sooner if needed. We discussed the expected course, resolution and complications of the diagnosis(es) in detail. Medication risks, benefits, costs, interactions, and alternatives were discussed as indicated. I advised her to contact the office if her condition worsens, changes or fails to improve as anticipated. She expressed understanding with the diagnosis(es) and plan. Fallon Pearson, was evaluated through a synchronous (real-time) audio-video encounter. The patient (or guardian if applicable) is aware that this is a billable service, which includes applicable co-pays. This Virtual Visit was conducted with patient's (and/or legal guardian's) consent.  The visit was conducted pursuant to the emergency declaration under the [facility] waiver authority and the [facility] and Response Supplemental Appropriations Act. Patient identification was verified, and a caregiver was present when appropriate.   The patient was located at: Home: [address] [address]  The provider was located at: Home: [address] [facility],

## 2022-08-26 ENCOUNTER — PATIENT MESSAGE (OUTPATIENT)
Dept: FAMILY MEDICINE CLINIC | Age: 52
End: 2022-08-26

## 2022-08-26 DIAGNOSIS — E03.9 ACQUIRED HYPOTHYROIDISM: ICD-10-CM

## 2022-08-26 DIAGNOSIS — M54.2 CHRONIC NECK PAIN: ICD-10-CM

## 2022-08-26 DIAGNOSIS — G89.29 CHRONIC NECK PAIN: ICD-10-CM

## 2022-08-26 RX ORDER — LEVOTHYROXINE SODIUM 50 UG/1
50 TABLET ORAL
Qty: 90 TABLET | Refills: 1 | Status: SHIPPED | OUTPATIENT
Start: 2022-08-26

## 2022-08-26 RX ORDER — ROSUVASTATIN CALCIUM 10 MG/1
TABLET, COATED ORAL
Qty: 90 TABLET | Refills: 1 | Status: SHIPPED | OUTPATIENT
Start: 2022-08-26

## 2022-08-26 RX ORDER — GABAPENTIN 600 MG/1
TABLET ORAL
Qty: 60 TABLET | Refills: 5 | Status: SHIPPED | OUTPATIENT
Start: 2022-08-26

## 2022-08-26 NOTE — TELEPHONE ENCOUNTER
From: Ulises Rivas  To: Tatianna Soni MD  Sent: 8/26/2022 7:33 AM EDT  Subject: Rx refills    Good morning,    I need refills for Gabapentin, Levothyroxine, and rosuvastatin.     Fallon Shah

## 2022-09-23 ENCOUNTER — APPOINTMENT (OUTPATIENT)
Dept: GENERAL RADIOLOGY | Age: 52
End: 2022-09-23
Attending: EMERGENCY MEDICINE
Payer: MEDICAID

## 2022-09-23 ENCOUNTER — HOSPITAL ENCOUNTER (EMERGENCY)
Age: 52
Discharge: HOME | End: 2022-09-23
Attending: EMERGENCY MEDICINE
Payer: MEDICAID

## 2022-09-23 VITALS
HEIGHT: 63 IN | SYSTOLIC BLOOD PRESSURE: 128 MMHG | HEART RATE: 87 BPM | WEIGHT: 131.39 LBS | DIASTOLIC BLOOD PRESSURE: 70 MMHG | BODY MASS INDEX: 23.28 KG/M2 | RESPIRATION RATE: 16 BRPM | OXYGEN SATURATION: 99 % | TEMPERATURE: 98.2 F

## 2022-09-23 DIAGNOSIS — S63.91XA SPRAIN OF RIGHT HAND, INITIAL ENCOUNTER: Primary | ICD-10-CM

## 2022-09-23 PROCEDURE — 73130 X-RAY EXAM OF HAND: CPT

## 2022-09-23 PROCEDURE — 99283 EMERGENCY DEPT VISIT LOW MDM: CPT

## 2022-09-23 RX ORDER — IBUPROFEN 200 MG
800 TABLET ORAL
COMMUNITY

## 2022-09-23 RX ORDER — DICLOFENAC SODIUM 10 MG/G
2 GEL TOPICAL 4 TIMES DAILY
COMMUNITY

## 2022-09-23 NOTE — ED NOTES
Last took ibuprofen art 1500 and voltaren cream helped reduce infammation and pain; pain 7/10 currently aching and throbbing in nature with burning

## 2022-09-23 NOTE — ED PROVIDER NOTES
Date of Service:  9/23/2022    Patient:  Ulises Rivas    Chief Complaint:  Hand Pain       HPI:  Ulises Rivas is a 50 y.o.  female who presents for evaluation of hand pain. Patient with known hand issues has seen orthopedics in the past.  Presents today after several days of hand pain. She states that she felt a pop/snap several days ago and has since been kind of using her brace intermittently. She is scheduled to see Ortho next week but today the pain got worse and she had some swelling to the hand. Pain in certain positions. Otherwise no other acute complaints.   Patient is right-hand dominant       Past Medical History:   Diagnosis Date    Acquired hypothyroidism 5/20/2018    Cervicalgia     Chronic neck pain 4/3/2013    Hypercholesteremia 5/18/2018    Migraine with aura, without mention of intractable migraine without mention of status migrainosus     Moderate episode of recurrent major depressive disorder ([facility] [address] 11/2/2016       Past Surgical History:   Procedure Laterality Date    HX ABDOMINAL LAPAROSCOPY      Adhesions found     HX APPENDECTOMY      HX CERVICAL FUSION  2006    HX HYSTERECTOMY  01/2010         Family History:   Problem Relation Age of Onset    Hypertension Father     No Known Problems Mother     Breast Cancer Other         not sure of age       Social History     Socioeconomic History    Marital status:      Spouse name: Not on file    Number of children: Not on file    Years of education: Not on file    Highest education level: Not on file   Occupational History    Not on file   Tobacco Use    Smoking status: Never    Smokeless tobacco: Never   Vaping Use    Vaping Use: Never used   Substance and Sexual Activity    Alcohol use: Not Currently     Alcohol/week: 0.0 standard drinks     Comment: rarely    Drug use: Yes     Types: Marijuana    Sexual activity: Yes     Partners: Male     Birth control/protection: Surgical   Other Topics Concern    Not on file   Social History Narrative    Not on file     Social Determinants of Health     Financial Resource Strain: Not on file   Food Insecurity: Not on file   Transportation Needs: Not on file   Physical Activity: Not on file   Stress: Not on file   Social Connections: Not on file   Intimate Partner Violence: Not on file   Housing Stability: Not on file         ALLERGIES: Latex, Erythromycin, and Vancomycin    Review of Systems   All other systems reviewed and are negative. Vitals:    09/23/22 1550 09/23/22 1606   BP: 128/70    Pulse: 87    Resp: 16    Temp: 98.2 °F (36.8 °C)    SpO2: 99% 99%   Weight: 59.6 kg (131 lb 6.3 oz)    Height: 5' 3\" (1.6 m)             Physical Exam  Vitals and nursing note reviewed. Constitutional:       Appearance: Normal appearance. Cardiovascular:      Rate and Rhythm: Normal rate. Pulmonary:      Effort: Pulmonary effort is normal.   Abdominal:      General: Abdomen is flat. Musculoskeletal:         General: Tenderness (Nonfocal tenderness about the right hand and wrist.  No obvious significant swelling erythema ecchymosis or other abnormality) present. Normal range of motion. Skin:     General: Skin is warm. Capillary Refill: Capillary refill takes less than 2 seconds. Neurological:      Mental Status: She is alert and oriented to person, place, and time. Sensory: No sensory deficit. Motor: No weakness. Psychiatric:         Mood and Affect: Mood normal.        MDM     VITAL SIGNS:  Patient Vitals for the past 4 hrs:   Temp Pulse Resp BP SpO2   09/23/22 1606 -- -- -- -- 99 %   09/23/22 1550 98.2 °F (36.8 °C) 87 16 128/70 99 %         LABS:  No results found for this or any previous visit (from the past 6 hour(s)). IMAGING:  XR HAND RT MIN 3 V   Final Result   No acute abnormality. Medications During Visit:  Medications - No data to display      DECISION MAKING:  Alexandria Galeas is a 50 y.o. female who comes in as above. Here, patient appears well.   No obvious deformity to the hand. There is very minimal fullness in between the MCP joints on the right hand but no distortion of the tissues as he cannot make out structures. Patient also has a strain/overuse injury to the hand. I recommended that she wear her wrist brace while sleeping and doing tasks that she most likely is irritated this. Patient asked for steroid specifically which I do not feel are necessary at which point she became very upset with me. I asked if there was an issue and she question what she should do about discomfort. I advised that ice Tylenol Motrin and follow-up with Ortho that she has in the 4 days will be sufficient      IMPRESSION:  1. Sprain of right hand, initial encounter        DISPOSITION:  Discharged      Discharge Medication List as of 9/23/2022  4:52 PM           Follow-up Information       Follow up With Specialties Details Why Contact Info    Lina Franco MD Family Medicine Schedule an appointment as soon as possible for a visit   [address] [place], [place] [phone]      Your Specialist  Schedule an appointment as soon as possible for a visit                 The patient is asked to follow-up with their primary care provider in the next several days. They are to call tomorrow for an appointment. The patient is asked to return promptly for any increased concerns or worsening of symptoms. They can return to this emergency department or any other emergency department.       Procedures

## 2022-09-23 NOTE — ED TRIAGE NOTES
Pt ambulates to treatment area she states that she normally see's Dr Alyx Kendrick for her hands and saw him 2 month ago, but for the past 2 days she has noticed some swelling on both hands and increased pain. She called for an appointment and will see him on Wednesday to see him. She lifted something the wrong way and heard a pop on the side of the right hand not sure if she injured it.   She has been taking Ibuprofen and Volteran cream.

## 2022-10-03 ENCOUNTER — PATIENT MESSAGE (OUTPATIENT)
Dept: NEUROLOGY | Age: 52
End: 2022-10-03

## 2022-10-03 DIAGNOSIS — G43.011 INTRACTABLE MIGRAINE WITHOUT AURA AND WITH STATUS MIGRAINOSUS: Primary | ICD-10-CM

## 2022-10-03 RX ORDER — PREDNISONE 10 MG/1
TABLET ORAL
Qty: 21 TABLET | Refills: 0 | Status: SHIPPED | OUTPATIENT
Start: 2022-10-03

## 2022-10-05 RX ORDER — RIMEGEPANT SULFATE 75 MG/75MG
TABLET, ORALLY DISINTEGRATING ORAL
Qty: 8 TABLET | Refills: 5 | Status: SHIPPED | OUTPATIENT
Start: 2022-10-05

## 2022-12-13 DIAGNOSIS — M54.2 NECK PAIN: ICD-10-CM

## 2022-12-13 RX ORDER — NORTRIPTYLINE HYDROCHLORIDE 25 MG/1
CAPSULE ORAL
Qty: 60 CAPSULE | Refills: 5 | Status: SHIPPED | OUTPATIENT
Start: 2022-12-13

## 2022-12-30 ENCOUNTER — HOSPITAL ENCOUNTER (EMERGENCY)
Age: 52
Discharge: HOME | End: 2022-12-30
Attending: STUDENT IN AN ORGANIZED HEALTH CARE EDUCATION/TRAINING PROGRAM
Payer: MEDICAID

## 2022-12-30 VITALS
DIASTOLIC BLOOD PRESSURE: 96 MMHG | HEART RATE: 86 BPM | SYSTOLIC BLOOD PRESSURE: 130 MMHG | WEIGHT: 141.76 LBS | OXYGEN SATURATION: 100 % | BODY MASS INDEX: 25.12 KG/M2 | TEMPERATURE: 97.8 F | HEIGHT: 63 IN | RESPIRATION RATE: 16 BRPM

## 2022-12-30 DIAGNOSIS — M51.36 DDD (DEGENERATIVE DISC DISEASE), LUMBAR: ICD-10-CM

## 2022-12-30 DIAGNOSIS — M54.50 ACUTE LEFT-SIDED LOW BACK PAIN, UNSPECIFIED WHETHER SCIATICA PRESENT: Primary | ICD-10-CM

## 2022-12-30 LAB
APPEARANCE UR: CLEAR
BACTERIA URNS QL MICRO: NEGATIVE /HPF
BILIRUB UR QL: NEGATIVE
COLOR UR: NORMAL
EPITH CASTS URNS QL MICRO: NORMAL /LPF
GLUCOSE UR STRIP.AUTO-MCNC: NEGATIVE MG/DL
HGB UR QL STRIP: NEGATIVE
KETONES UR QL STRIP.AUTO: NEGATIVE MG/DL
LEUKOCYTE ESTERASE UR QL STRIP.AUTO: NEGATIVE
NITRITE UR QL STRIP.AUTO: NEGATIVE
PH UR STRIP: 6 [PH] (ref 5–8)
PROT UR STRIP-MCNC: NEGATIVE MG/DL
RBC #/AREA URNS HPF: NORMAL /HPF (ref 0–5)
SP GR UR REFRACTOMETRY: <1.005 (ref 1–1.03)
UA: UC IF INDICATED,UAUC: NORMAL
UROBILINOGEN UR QL STRIP.AUTO: 0.2 EU/DL (ref 0.2–1)
WBC URNS QL MICRO: NORMAL /HPF (ref 0–4)

## 2022-12-30 PROCEDURE — 74011000250 HC RX REV CODE- 250: Performed by: STUDENT IN AN ORGANIZED HEALTH CARE EDUCATION/TRAINING PROGRAM

## 2022-12-30 PROCEDURE — 81001 URINALYSIS AUTO W/SCOPE: CPT

## 2022-12-30 PROCEDURE — 96372 THER/PROPH/DIAG INJ SC/IM: CPT

## 2022-12-30 PROCEDURE — 99284 EMERGENCY DEPT VISIT MOD MDM: CPT

## 2022-12-30 PROCEDURE — 74011250636 HC RX REV CODE- 250/636: Performed by: STUDENT IN AN ORGANIZED HEALTH CARE EDUCATION/TRAINING PROGRAM

## 2022-12-30 PROCEDURE — 74011250637 HC RX REV CODE- 250/637: Performed by: STUDENT IN AN ORGANIZED HEALTH CARE EDUCATION/TRAINING PROGRAM

## 2022-12-30 RX ORDER — DIAZEPAM 5 MG/1
5 TABLET ORAL
Status: COMPLETED | OUTPATIENT
Start: 2022-12-30 | End: 2022-12-30

## 2022-12-30 RX ORDER — KETOROLAC TROMETHAMINE 30 MG/ML
30 INJECTION, SOLUTION INTRAMUSCULAR; INTRAVENOUS
Status: COMPLETED | OUTPATIENT
Start: 2022-12-30 | End: 2022-12-30

## 2022-12-30 RX ORDER — LIDOCAINE 4 G/100G
1 PATCH TOPICAL
Status: DISCONTINUED | OUTPATIENT
Start: 2022-12-30 | End: 2022-12-30 | Stop reason: HOSPADM

## 2022-12-30 RX ORDER — DIAZEPAM 2 MG/1
2 TABLET ORAL
Qty: 9 TABLET | Refills: 0 | Status: SHIPPED | OUTPATIENT
Start: 2022-12-30 | End: 2023-01-02

## 2022-12-30 RX ORDER — OXYCODONE HYDROCHLORIDE 5 MG/1
5 TABLET ORAL
Status: COMPLETED | OUTPATIENT
Start: 2022-12-30 | End: 2022-12-30

## 2022-12-30 RX ORDER — KETOROLAC TROMETHAMINE 30 MG/ML
15 INJECTION, SOLUTION INTRAMUSCULAR; INTRAVENOUS
Status: DISCONTINUED | OUTPATIENT
Start: 2022-12-30 | End: 2022-12-30

## 2022-12-30 RX ORDER — OXYCODONE HYDROCHLORIDE 5 MG/1
5 TABLET ORAL
Qty: 12 TABLET | Refills: 0 | Status: SHIPPED | OUTPATIENT
Start: 2022-12-30 | End: 2023-01-02

## 2022-12-30 RX ADMIN — OXYCODONE 5 MG: 5 TABLET ORAL at 12:59

## 2022-12-30 RX ADMIN — DIAZEPAM 5 MG: 5 TABLET ORAL at 11:29

## 2022-12-30 RX ADMIN — KETOROLAC TROMETHAMINE 30 MG: 30 INJECTION, SOLUTION INTRAMUSCULAR at 11:29

## 2022-12-30 NOTE — ED NOTES
Bedside shift change report given to [facility] (oncoming nurse) by Anam Chapa RN (offgoing nurse). Report included the following information SBAR.

## 2022-12-30 NOTE — ED NOTES
Pt provided w/DC instructions. X2 new rx sent to pt's pharmacy. Pt verbalized understanding of DC instructions, denied questions/concerns. Pt was well appearing. Ambulatory on DC w/strong, steady gait.

## 2022-12-30 NOTE — ED TRIAGE NOTES
Pt ambulates to treatment area has left lower back pain since last night took Ibuprofen last night but nothing today.   Has some nausea with the pain has some urinary pain

## 2022-12-30 NOTE — ED PROVIDER NOTES
EMERGENCY DEPARTMENT HISTORY AND PHYSICAL EXAM      Date: 12/30/2022  Patient Name: Honey Mota    History of Presenting Illness     HPI: Honey Mota, 50 y.o. female with past medical history of chronic back pain, DDD, presents to the ED with cc of left-sided atraumatic back pain x1 day. Patient states that she has had ongoing chronic neck as well as low back pain. States that since last night, she has now began to experience left-sided low back pain that is different in character from her usual chronic back pain. Denies preceding trauma or exacerbating activities. States that the pain is sharp, shooting, and radiates slightly down her buttocks. Reports pain is worsened by movement, as well as ambulation and weight wearing. Subjectively feels that her left leg seems weaker since back pain began, though she was able to ambulate into the ER independently with steady gait and is able to lift and hold her leg up without difficulty. No saddle anesthesia or changes to bowel/bladder habits. Last took dose of ibuprofen last night. No medications taken today. PCP: Lxeus Jackson MD    No current facility-administered medications on file prior to encounter. Current Outpatient Medications on File Prior to Encounter   Medication Sig Dispense Refill    rimegepant (Nurtec ODT) 75 mg disintegrating tablet 1 tablet by mouth daily PRN. Max 1 tablet in 24 hours. 8 Tablet 5    nortriptyline (PAMELOR) 25 mg capsule TAKE 1 TO 2 CAPSULES BY MOUTH IN THE EVENING AT BEDTIME 60 Capsule 5    ubrogepant (Ubrelvy) 100 mg tablet Take one tablet po at HA onset. May repeat in two hours prn for max daily dose of two tablets. 16 Tablet 3    predniSONE (DELTASONE) 10 mg tablet Take 6 tabs on Day 1, then reduce by 1 tablet a day over the next 5 days till done. Take in AM with food. 21 Tablet 0    ibuprofen (MOTRIN) 200 mg tablet Take 800 mg by mouth.       diclofenac (Voltaren) 1 % gel Apply 2 g to affected area four (4) times daily. gabapentin (NEURONTIN) 600 mg tablet TAKE 1 TABLET BY MOUTH TWO (2) TIMES A DAY. MAX DAILY: 1,200 MG. 60 Tablet 5    levothyroxine (SYNTHROID) 50 mcg tablet Take 1 Tablet by mouth Daily (before breakfast). Need follow up visit and labs. 90 Tablet 1    rosuvastatin (CRESTOR) 10 mg tablet TAKE 1 TABLET BY MOUTH NIGHTLY 90 Tablet 1    SUMAtriptan (IMITREX) 100 mg tablet Take one po at onset of migraine. May repeat dose x1 if needed after two hours. Max of two doses in 24 hours 9 Tablet 5    galcanezumab-gnlm (Emgality Pen) 120 mg/mL injection INJECT 1 ML SUBCUTANEOUSLY EVERY 30 DAYS 1 Each 5    ondansetron (ZOFRAN ODT) 8 mg disintegrating tablet Take 1 Tablet by mouth every eight (8) hours as needed for Nausea. (Patient not taking: Reported on 9/23/2022) 30 Tablet 0    sertraline (ZOLOFT) 50 mg tablet Take 1 Tablet by mouth daily.  Indications: anxiety 90 Tablet 1    dicyclomine (BENTYL) 10 mg capsule TAKE 1 CAPSULE BY MOUTH THREE TIMES A DAY FOR IRRITABLE COLON (Patient not taking: Reported on 9/23/2022) 90 Capsule 5       Past History     Past Medical History:  Past Medical History:   Diagnosis Date    Acquired hypothyroidism 5/20/2018    Cervicalgia     Chronic neck pain 4/3/2013    Hypercholesteremia 5/18/2018    Migraine with aura, without mention of intractable migraine without mention of status migrainosus     Moderate episode of recurrent major depressive disorder ([facility] [address] 11/2/2016       Past Surgical History:  Past Surgical History:   Procedure Laterality Date    HX ABDOMINAL LAPAROSCOPY      Adhesions found     HX APPENDECTOMY      HX CERVICAL FUSION  2006    HX HYSTERECTOMY  01/2010       Family History:  Family History   Problem Relation Age of Onset    Hypertension Father     No Known Problems Mother     Breast Cancer Other         not sure of age       Social History:  Social History     Tobacco Use    Smoking status: Never    Smokeless tobacco: Never   Vaping Use    Vaping Use: Never used Substance Use Topics    Alcohol use: Not Currently     Alcohol/week: 0.0 standard drinks     Comment: rarely    Drug use: Yes     Types: Marijuana       Allergies: Allergies   Allergen Reactions    Latex Rash    Erythromycin Nausea and Vomiting    Vancomycin Swelling         Review of Systems   Review of Systems   Constitutional:  Negative for chills and fever. HENT:  Negative for congestion and rhinorrhea. Eyes:  Negative for visual disturbance. Respiratory:  Negative for chest tightness and shortness of breath. Cardiovascular:  Negative for chest pain and palpitations. Gastrointestinal:  Negative for abdominal pain, diarrhea, nausea and vomiting. Genitourinary:  Negative for dysuria, flank pain and hematuria. Musculoskeletal:  Positive for back pain and myalgias. Negative for neck pain. Skin:  Negative for rash. Allergic/Immunologic: Negative for immunocompromised state. Neurological:  Negative for dizziness, speech difficulty, weakness and headaches. Hematological:  Negative for adenopathy. Psychiatric/Behavioral:  Negative for dysphoric mood and suicidal ideas. Physical Exam   Physical Exam  Vitals and nursing note reviewed. Constitutional:       General: She is not in acute distress. Appearance: She is not ill-appearing or toxic-appearing. HENT:      Head: Normocephalic and atraumatic. Nose: Nose normal.      Mouth/Throat:      Mouth: Mucous membranes are moist.   Eyes:      Extraocular Movements: Extraocular movements intact. Pupils: Pupils are equal, round, and reactive to light. Cardiovascular:      Rate and Rhythm: Normal rate and regular rhythm. Pulses: Normal pulses. Pulmonary:      Effort: Pulmonary effort is normal.      Breath sounds: No stridor. No wheezing or rhonchi. Abdominal:      General: Abdomen is flat. There is no distension. Tenderness: There is no abdominal tenderness.    Musculoskeletal:      Cervical back: Normal range of motion and neck supple. Lumbar back: Spasms and tenderness present. No swelling, edema, deformity, signs of trauma, lacerations or bony tenderness. Normal range of motion. Positive left straight leg raise test. Negative right straight leg raise test.      Comments: NVI in bilateral lower extremities. 5 out of 5 strength in both legs with equal sensation. Skin:     General: Skin is warm and dry. Neurological:      General: No focal deficit present. Mental Status: She is alert and oriented to person, place, and time. Psychiatric:         Judgment: Judgment normal.       Diagnostic Study Results     Labs -     Recent Results (from the past 124 hour(s))   URINALYSIS W/ REFLEX CULTURE    Collection Time: 12/30/22 11:26 AM    Specimen: Urine   Result Value Ref Range    Color YELLOW/STRAW      Appearance CLEAR CLEAR      Specific gravity <1.005 1.003 - 1.030    pH (UA) 6.0 5.0 - 8.0      Protein Negative NEG mg/dL    Glucose Negative NEG mg/dL    Ketone Negative NEG mg/dL    Bilirubin Negative NEG      Blood Negative NEG      Urobilinogen 0.2 0.2 - 1.0 EU/dL    Nitrites Negative NEG      Leukocyte Esterase Negative NEG      WBC 0-4 0 - 4 /hpf    RBC 0-5 0 - 5 /hpf    Epithelial cells FEW FEW /lpf    Bacteria Negative NEG /hpf    UA:UC IF INDICATED CULTURE NOT INDICATED BY UA RESULT CNI         Radiologic Studies -   No orders to display     CT Results  (Last 48 hours)      None          CXR Results  (Last 48 hours)      None            Medical Decision Making   I, Britton Alpers, MD-- am the first provider for this patient, and I am the attending of record for this patient encounter. I reviewed the vital signs, available nursing notes, past medical history, past surgical history, family history and social history. Vital Signs-Reviewed the patient's vital signs.   Patient Vitals for the past 24 hrs:   Temp Pulse Resp BP SpO2   12/30/22 1040 97.8 °F (36.6 °C) 97 16 (!) 150/114 100 %     Records Reviewed: Nursing Notes and Old Medical Records    Provider Notes (Medical Decision Making): Symptoms most likely 2/2 to DDD with radicular pain. Atraumatic and without red flag back pain symptoms- do not suspect cauda equina or conus medullaris. No indication for imaging. Other ddx considered include pyelonephritis, kidney stone, etc- though low suspicion based on history and exam.  Will check UA and hold off on any other labs or imaging for now. We will treat back pain symptomatically with Toradol, oxycodone, Valium, and lidocaine patch. On repeat assessment, patient had symptomatic relief with above treatment. We will provide her with short course Rx for oxycodone, Valium to treat acute pain. Advised follow-up with pain management physician along with PCP for ongoing management of chronic pain in setting of known DDD. Patient felt comfortable with plan for discharge. Return precautions discussed. ED Course:   Initial assessment performed. The patient's presenting problems have been discussed, and they are in agreement with the care plan formulated and outlined with them. I have encouraged them to ask questions as they arise throughout their visit. Tyson Hardy MD      Disposition:  DC      DISCHARGE PLAN:  1. Discharge Medication List as of 12/30/2022  1:12 PM        START taking these medications    Details   oxyCODONE IR (Roxicodone) 5 mg immediate release tablet Take 1 Tablet by mouth every six (6) hours as needed for Pain for up to 3 days. Max Daily Amount: 20 mg., Normal, Disp-12 Tablet, R-0      diazePAM (Valium) 2 mg tablet Take 1 Tablet by mouth every eight (8) hours as needed for Muscle Spasm(s) (spasm) for up to 3 days. Max Daily Amount: 6 mg., Normal, Disp-9 Tablet, R-0           CONTINUE these medications which have NOT CHANGED    Details   rimegepant (Nurtec ODT) 75 mg disintegrating tablet 1 tablet by mouth daily PRN. Max 1 tablet in 24 hours. , Normal, Disp-8 Tablet, R-5 nortriptyline (PAMELOR) 25 mg capsule TAKE 1 TO 2 CAPSULES BY MOUTH IN THE EVENING AT BEDTIME, Normal, Disp-60 Capsule, R-5      ubrogepant (Ubrelvy) 100 mg tablet Take one tablet po at HA onset. May repeat in two hours prn for max daily dose of two tablets., Normal, Disp-16 Tablet, R-3Prior auth approved - 10/21/23      predniSONE (DELTASONE) 10 mg tablet Take 6 tabs on Day 1, then reduce by 1 tablet a day over the next 5 days till done. Take in AM with food., Normal, Disp-21 Tablet, R-0      ibuprofen (MOTRIN) 200 mg tablet Take 800 mg by mouth., Historical Med      diclofenac (Voltaren) 1 % gel Apply 2 g to affected area four (4) times daily. , Historical Med      gabapentin (NEURONTIN) 600 mg tablet TAKE 1 TABLET BY MOUTH TWO (2) TIMES A DAY. MAX DAILY: 1,200 MG., Normal, Disp-60 Tablet, R-5This request is for a new prescription for a controlled substance as required by Federal/State law. DX Code Needed  . levothyroxine (SYNTHROID) 50 mcg tablet Take 1 Tablet by mouth Daily (before breakfast). Need follow up visit and labs., Normal, Disp-90 Tablet, R-1      rosuvastatin (CRESTOR) 10 mg tablet TAKE 1 TABLET BY MOUTH NIGHTLY, Normal, Disp-90 Tablet, R-1      SUMAtriptan (IMITREX) 100 mg tablet Take one po at onset of migraine. May repeat dose x1 if needed after two hours. Max of two doses in 24 hours, Normal, Disp-9 Tablet, R-5      galcanezumab-gnlm (Emgality Pen) 120 mg/mL injection INJECT 1 ML SUBCUTANEOUSLY EVERY 30 DAYS, Normal, Disp-1 Each, R-5DX Code Needed  . ondansetron (ZOFRAN ODT) 8 mg disintegrating tablet Take 1 Tablet by mouth every eight (8) hours as needed for Nausea., Normal, Disp-30 Tablet, R-0      sertraline (ZOLOFT) 50 mg tablet Take 1 Tablet by mouth daily. Indications: anxiety, Normal, Disp-90 Tablet, R-1      dicyclomine (BENTYL) 10 mg capsule TAKE 1 CAPSULE BY MOUTH THREE TIMES A DAY FOR IRRITABLE COLON, Normal, Disp-90 Capsule, R-5DX Code Needed  . K58.0           2. Follow-up Information       Follow up With Specialties Details Why Contact Info    Grecia Steinberg MD Family Medicine Schedule an appointment as soon as possible for a visit   [address] [place] [address]  [phone]      Your pain managment physician  Schedule an appointment as soon as possible for a visit             3.  Return to ED if worse     Diagnosis     Clinical Impression:   1. Acute left-sided low back pain, unspecified whether sciatica present    2. DDD (degenerative disc disease), lumbar        Attestations:    Toi Pena MD    Please note that this dictation was completed with Runscope, the Cel[employer] voice recognition software. Quite often unanticipated grammatical, syntax, homophones, and other interpretive errors are inadvertently transcribed by the computer software. Please disregard these errors. Please excuse any errors that have escaped final proofreading. Thank you.

## 2023-02-06 DIAGNOSIS — G43.011 INTRACTABLE MIGRAINE WITHOUT AURA AND WITH STATUS MIGRAINOSUS: ICD-10-CM

## 2023-02-06 RX ORDER — UBROGEPANT 100 MG/1
TABLET ORAL
Qty: 16 TABLET | Refills: 0 | Status: SHIPPED | OUTPATIENT
Start: 2023-02-06

## 2023-02-06 RX ORDER — GALCANEZUMAB 120 MG/ML
INJECTION, SOLUTION SUBCUTANEOUS
Qty: 1 ML | Refills: 0 | Status: SHIPPED | OUTPATIENT
Start: 2023-02-06

## 2023-02-13 ENCOUNTER — OFFICE VISIT (OUTPATIENT)
Dept: FAMILY MEDICINE CLINIC | Age: 53
End: 2023-02-13
Payer: MEDICAID

## 2023-02-13 DIAGNOSIS — G89.29 CHRONIC LOW BACK PAIN, UNSPECIFIED BACK PAIN LATERALITY, UNSPECIFIED WHETHER SCIATICA PRESENT: ICD-10-CM

## 2023-02-13 DIAGNOSIS — F33.1 MODERATE EPISODE OF RECURRENT MAJOR DEPRESSIVE DISORDER (HCC): ICD-10-CM

## 2023-02-13 DIAGNOSIS — Z79.899 ENCOUNTER FOR LONG-TERM CURRENT USE OF MEDICATION: ICD-10-CM

## 2023-02-13 DIAGNOSIS — M79.642 PAIN IN BOTH HANDS: ICD-10-CM

## 2023-02-13 DIAGNOSIS — E03.9 ACQUIRED HYPOTHYROIDISM: ICD-10-CM

## 2023-02-13 DIAGNOSIS — F43.9 STRESS AT HOME: ICD-10-CM

## 2023-02-13 DIAGNOSIS — G89.29 CHRONIC NECK PAIN: ICD-10-CM

## 2023-02-13 DIAGNOSIS — L98.9 SKIN LESION OF RIGHT ARM: ICD-10-CM

## 2023-02-13 DIAGNOSIS — Z12.31 ENCOUNTER FOR SCREENING MAMMOGRAM FOR MALIGNANT NEOPLASM OF BREAST: ICD-10-CM

## 2023-02-13 DIAGNOSIS — M25.511 CHRONIC RIGHT SHOULDER PAIN: ICD-10-CM

## 2023-02-13 DIAGNOSIS — M54.50 CHRONIC LOW BACK PAIN, UNSPECIFIED BACK PAIN LATERALITY, UNSPECIFIED WHETHER SCIATICA PRESENT: ICD-10-CM

## 2023-02-13 DIAGNOSIS — E55.9 VITAMIN D DEFICIENCY: ICD-10-CM

## 2023-02-13 DIAGNOSIS — G89.29 CHRONIC RIGHT SHOULDER PAIN: ICD-10-CM

## 2023-02-13 DIAGNOSIS — M79.641 PAIN IN BOTH HANDS: ICD-10-CM

## 2023-02-13 DIAGNOSIS — E78.00 HYPERCHOLESTEREMIA: Primary | ICD-10-CM

## 2023-02-13 DIAGNOSIS — G43.109 MIGRAINE WITH AURA AND WITHOUT STATUS MIGRAINOSUS, NOT INTRACTABLE: ICD-10-CM

## 2023-02-13 DIAGNOSIS — M54.2 CHRONIC NECK PAIN: ICD-10-CM

## 2023-02-13 PROCEDURE — 99215 OFFICE O/P EST HI 40 MIN: CPT | Performed by: FAMILY MEDICINE

## 2023-02-13 RX ORDER — DICLOFENAC SODIUM 75 MG/1
75 TABLET, DELAYED RELEASE ORAL 2 TIMES DAILY
Qty: 60 TABLET | Refills: 0
Start: 2023-02-13

## 2023-02-13 RX ORDER — GABAPENTIN 600 MG/1
600 TABLET ORAL 3 TIMES DAILY
Qty: 90 TABLET | Refills: 5 | Status: SHIPPED | OUTPATIENT
Start: 2023-02-13

## 2023-02-13 RX ORDER — LEVOTHYROXINE SODIUM 50 UG/1
50 TABLET ORAL
Qty: 90 TABLET | Refills: 1 | Status: SHIPPED | OUTPATIENT
Start: 2023-02-13

## 2023-02-13 RX ORDER — SERTRALINE HYDROCHLORIDE 50 MG/1
50 TABLET, FILM COATED ORAL DAILY
Qty: 90 TABLET | Refills: 1 | Status: SHIPPED | OUTPATIENT
Start: 2023-02-13

## 2023-02-13 RX ORDER — ROSUVASTATIN CALCIUM 10 MG/1
TABLET, COATED ORAL
Qty: 90 TABLET | Refills: 1 | Status: SHIPPED | OUTPATIENT
Start: 2023-02-13

## 2023-02-14 ENCOUNTER — TELEPHONE (OUTPATIENT)
Dept: RHEUMATOLOGY | Age: 53
End: 2023-02-14

## 2023-02-14 VITALS
SYSTOLIC BLOOD PRESSURE: 108 MMHG | BODY MASS INDEX: 26.4 KG/M2 | WEIGHT: 149 LBS | OXYGEN SATURATION: 97 % | RESPIRATION RATE: 20 BRPM | HEART RATE: 97 BPM | DIASTOLIC BLOOD PRESSURE: 70 MMHG | HEIGHT: 63 IN | TEMPERATURE: 97.9 F

## 2023-02-14 NOTE — TELEPHONE ENCOUNTER
I called PT and informed her that we got her referral but we aren't currently talking NP's at this time.

## 2023-02-14 NOTE — PROGRESS NOTES
Identified pt with two pt identifiers(name and ). Chief Complaint   Patient presents with    Back Pain    Medication Refill    Depression        Health Maintenance Due   Topic    Hepatitis C Screening     COVID-19 Vaccine (4 - Booster for Pfizer series)    Flu Vaccine (1)       Wt Readings from Last 3 Encounters:   23 149 lb (67.6 kg)   22 141 lb 12.1 oz (64.3 kg)   22 131 lb 6.3 oz (59.6 kg)     Temp Readings from Last 3 Encounters:   23 97.9 °F (36.6 °C) (Temporal)   22 97.8 °F (36.6 °C)   22 98.2 °F (36.8 °C)     BP Readings from Last 3 Encounters:   23 108/70   22 (!) 130/96   22 128/70     Pulse Readings from Last 3 Encounters:   23 97   22 86   22 87         Learning Assessment:  :     Learning Assessment 2021   PRIMARY LEARNER Patient Patient Patient Patient   HIGHEST LEVEL OF EDUCATION - PRIMARY LEARNER  DID NOT GRADUATE HIGH SCHOOL - DID NOT GRADUATE HIGH SCHOOL DID NOT GRADUATE HIGH SCHOOL   BARRIERS PRIMARY LEARNER NONE NONE - NONE   CO-LEARNER CAREGIVER No No - No   PRIMARY LANGUAGE ENGLISH ENGLISH ENGLISH ENGLISH   LEARNER PREFERENCE PRIMARY LISTENING READING DEMONSTRATION DEMONSTRATION     DEMONSTRATION LISTENING - -     - DEMONSTRATION - -   ANSWERED BY patient self patient patient    RELATIONSHIP SELF SELF SELF SELF       Depression Screening:  :     3 most recent PHQ Screens 2023   PHQ Not Done -   Little interest or pleasure in doing things Several days   Feeling down, depressed, irritable, or hopeless Several days   Total Score PHQ 2 2       Fall Risk Assessment:  :     Fall Risk Assessment, last 12 mths 3/6/2017   Able to walk? Yes   Fall in past 12 months? No       Abuse Screening:  :     Abuse Screening Questionnaire 2023 2022 2021 10/12/2020 3/7/2019 2018 3/6/2017   Do you ever feel afraid of your partner?  N N N N N N N   Are you in a relationship with someone who physically or mentally threatens you? N N N N N N N   Is it safe for you to go home? Y Y Y Y Y Y Y       Coordination of Care Questionnaire:  :     1) Have you been to an emergency room, urgent care clinic since your last visit? Yes ER visit for back pain at [address] [facility] since your last visit? no             2) Have you seen or consulted any other health care providers outside of [address] [place] [address] since your last visit? no  (Include any pap smears or colon screenings in this section.)    3) Do you have an Advance Directive on file? no  Are you interested in receiving information about Advance Directives? no    Patient is accompanied by self I have received verbal consent from Thalia Guevara to discuss any/all medical information while they are present in the room. 4.  For patients aged 39-70: Has the patient had a colonoscopy / FIT/ Cologuard? Yes - no Care Gap present      If the patient is female:    5. For patients aged 41-77: Has the patient had a mammogram within the past 2 years? Yes - no Care Gap present      6. For patients aged 21-65: Has the patient had a pap smear?  Yes - no Care Gap present

## 2023-02-14 NOTE — PROGRESS NOTES
Subjective:     Nicole El is a 50 y.o. female who presents for follow up of hyperlipidemia and hypothyroidism. Diet and Lifestyle: generally follows a low fat low cholesterol diet, generally follows a low sodium diet, sedentary, nonsmoker  Home BP Monitoring: is not measured at home    Cardiovascular ROS: taking medications as instructed, no medication side effects noted, no TIA's, no chest pain on exertion, no dyspnea on exertion, no swelling of ankles. New concerns: chronic pain involve low back, hands, and R shoulder. Labs done by [facility] 9/2022 neg for RA, NORA, normal ESR, CRP. She would like referral to see Dr Beti Hussein for [facility]. Shoulder pain affecting ADL's. 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 Trouble raise R arm. Taken  mg BID. Bad migraines. Need FU Neurology. Skin lesion on R forearm. Has DERM appt coming up. Patient Active Problem List    Diagnosis Date Noted    Status post hysterectomy 06/29/2022    Acquired hypothyroidism 05/20/2018    Vitamin D deficiency 05/18/2018    Hypercholesteremia 05/18/2018    Moderate episode of recurrent major depressive disorder ([facility] [address] 11/02/2016    Anxiety 11/02/2016    Chronic back pain 11/23/2015    Tinnitus of both ears 12/30/2013    Chronic neck pain 04/03/2013    Migraine with aura 01/11/2013     Current Outpatient Medications   Medication Sig Dispense Refill    diclofenac EC (VOLTAREN) 75 mg EC tablet Take 1 Tablet by mouth two (2) times a day. Indications: joint damage causing pain and loss of function 60 Tablet 0    rosuvastatin (CRESTOR) 10 mg tablet TAKE 1 TABLET BY MOUTH NIGHTLY 90 Tablet 1    gabapentin (NEURONTIN) 600 mg tablet Take 1 Tablet by mouth three (3) times daily. Max Daily Amount: 1,800 mg. Indications: neuropathic pain 90 Tablet 5    levothyroxine (SYNTHROID) 50 mcg tablet Take 1 Tablet by mouth Daily (before breakfast). Need follow up visit and labs.  90 Tablet 1    sertraline (ZOLOFT) 50 mg tablet Take 1 Tablet by mouth daily. Indications: anxiety 90 Tablet 1    nortriptyline (PAMELOR) 25 mg capsule TAKE 1 TO 2 CAPSULES BY MOUTH IN THE EVENING AT BEDTIME 60 Capsule 5    Ubrelvy 100 mg tablet TAKE 1 TABLET BY MOUTH AT HEADACHE ONSET. MAY REPEAT IN 2 HOURS AS NEEDED. DO NOT EXCEED 2 TABLETS PER 24 HOURS 16 Tablet 0    Emgality Pen 120 mg/mL injection INJECT 1 ML SUBCUTANEOUSLY EVERY 30 DAYS 1 mL 0    diclofenac (Voltaren) 1 % gel Apply 2 g to affected area four (4) times daily. dicyclomine (BENTYL) 10 mg capsule TAKE 1 CAPSULE BY MOUTH THREE TIMES A DAY FOR IRRITABLE COLON (Patient not taking: Reported on 9/23/2022) 90 Capsule 5     Allergies   Allergen Reactions    Latex Rash    Erythromycin Nausea and Vomiting    Vancomycin Swelling     Past Medical History:   Diagnosis Date    Acquired hypothyroidism 5/20/2018    Cervicalgia     Chronic neck pain 4/3/2013    Hypercholesteremia 5/18/2018    Migraine with aura, without mention of intractable migraine without mention of status migrainosus     Moderate episode of recurrent major depressive disorder ([facility] 11/2/2016             Review of Systems, additional:  Pertinent items are noted in HPI. Objective:     Visit Vitals  /70 (BP 1 Location: Left arm, BP Patient Position: Sitting, BP Cuff Size: Adult)   Pulse 97   Temp 97.9 °F (36.6 °C) (Temporal)   Resp 20   Ht 5' 3\" (1.6 m)   Wt 149 lb (67.6 kg)   LMP 01/09/2010   SpO2 97%   BMI 26.39 kg/m²     Appearance: alert, well appearing, and in no distress and normal appearing weight. General exam: CVS exam BP noted to be well controlled today in office, S1, S2 normal, no gallop, no murmur, chest clear, no JVD, no HSM, no edema. Lab review: orders written for new lab studies as appropriate; see orders. Assessment/Plan:     . Allena Closs ICD-10-CM ICD-9-CM    1. Hypercholesteremia  E78.00 272.0 LIPID PANEL      LIPID PANEL      rosuvastatin (CRESTOR) 10 mg tablet      2.  Acquired hypothyroidism  E03.9 244.9 TSH 3RD GENERATION      T4, FREE      TSH 3RD GENERATION      T4, FREE      levothyroxine (SYNTHROID) 50 mcg tablet      3. Vitamin D deficiency  E55.9 268.9 VITAMIN D, 25 HYDROXY      VITAMIN D, 25 HYDROXY      4. Moderate episode of recurrent major depressive disorder (HCC)  F33.1 296.32       5. Migraine with aura and without status migrainosus, not intractable  G43.109 346.00       6. Encounter for screening mammogram for malignant neoplasm of breast  Z12.31 V76.12 [facility] MAMMO BI SCREENING INCL CAD      7. Pain in both hands  M79.641 729.5 REFERRAL TO RHEUMATOLOGY    M79.642  diclofenac EC (VOLTAREN) 75 mg EC tablet      8. Chronic right shoulder pain  M25.511 719.41 diclofenac EC (VOLTAREN) 75 mg EC tablet    G89.29 338.29 REFERRAL TO ORTHOPEDICS      9. Chronic low back pain, unspecified back pain laterality, unspecified whether sciatica present  M54.50 724.2 REFERRAL TO PAIN CLINIC    G89.29 338.29       10. Encounter for long-term current use of medication  Z79.899 V58.69 CBC WITH AUTOMATED DIFF      METABOLIC PANEL, COMPREHENSIVE      CBC WITH AUTOMATED DIFF      METABOLIC PANEL, COMPREHENSIVE      11. Chronic neck pain  M54.2 723.1 gabapentin (NEURONTIN) 600 mg tablet    G89.29 338.29       12. Stress at home  F43.9 V61.9 sertraline (ZOLOFT) 50 mg tablet      13.  Skin lesion of right arm  L98.9 709.9 REFERRAL TO DERMATOLOGY

## 2023-02-28 ENCOUNTER — APPOINTMENT (OUTPATIENT)
Dept: FAMILY MEDICINE CLINIC | Age: 53
End: 2023-02-28

## 2023-02-28 ENCOUNTER — TRANSCRIBE ORDER (OUTPATIENT)
Dept: SCHEDULING | Age: 53
End: 2023-02-28

## 2023-02-28 DIAGNOSIS — M75.121 COMPLETE TEAR OF RIGHT ROTATOR CUFF: Primary | ICD-10-CM

## 2023-03-05 ENCOUNTER — TELEPHONE (OUTPATIENT)
Dept: FAMILY MEDICINE CLINIC | Age: 53
End: 2023-03-05

## 2023-03-05 DIAGNOSIS — E55.9 VITAMIN D DEFICIENCY: Primary | ICD-10-CM

## 2023-03-05 RX ORDER — ERGOCALCIFEROL 1.25 MG/1
50000 CAPSULE ORAL
Qty: 5 CAPSULE | Refills: 12 | Status: SHIPPED | OUTPATIENT
Start: 2023-03-05

## 2023-03-06 ENCOUNTER — TELEPHONE (OUTPATIENT)
Dept: FAMILY MEDICINE CLINIC | Age: 53
End: 2023-03-06

## 2023-03-06 NOTE — TELEPHONE ENCOUNTER
----- Message from Julien Chamberlain MD sent at 3/5/2023  7:34 PM EST -----  Good blood cell counts. Normal sugar, kidney, and liver function. Good cholesterol numbers. Low Vit D level. I recommend you take prescription Vit D supplement once a week. Good thyroid level.

## 2023-03-07 ENCOUNTER — HOSPITAL ENCOUNTER (OUTPATIENT)
Dept: MRI IMAGING | Age: 53
Discharge: HOME | End: 2023-03-07
Attending: ORTHOPAEDIC SURGERY
Payer: MEDICAID

## 2023-03-07 DIAGNOSIS — G43.011 INTRACTABLE MIGRAINE WITHOUT AURA AND WITH STATUS MIGRAINOSUS: ICD-10-CM

## 2023-03-07 DIAGNOSIS — M75.121 COMPLETE TEAR OF RIGHT ROTATOR CUFF: ICD-10-CM

## 2023-03-07 PROCEDURE — 73221 MRI JOINT UPR EXTREM W/O DYE: CPT

## 2023-03-10 RX ORDER — GALCANEZUMAB 120 MG/ML
INJECTION, SOLUTION SUBCUTANEOUS
Qty: 1 ML | Refills: 0 | Status: SHIPPED | OUTPATIENT
Start: 2023-03-10

## 2023-05-15 RX ORDER — GALCANEZUMAB 120 MG/ML
INJECTION, SOLUTION SUBCUTANEOUS
Qty: 1 ML | Refills: 0 | Status: SHIPPED | OUTPATIENT
Start: 2023-05-15

## 2023-06-19 RX ORDER — GALCANEZUMAB 120 MG/ML
INJECTION, SOLUTION SUBCUTANEOUS
Qty: 1 ML | Refills: 0 | Status: SHIPPED | OUTPATIENT
Start: 2023-06-19

## 2023-06-30 RX ORDER — UBROGEPANT 100 MG/1
TABLET ORAL
Qty: 16 TABLET | Refills: 0 | OUTPATIENT
Start: 2023-06-30

## 2023-07-03 RX ORDER — UBROGEPANT 100 MG/1
TABLET ORAL
Qty: 16 TABLET | Refills: 5 | Status: SHIPPED | OUTPATIENT
Start: 2023-07-03

## 2023-07-17 DIAGNOSIS — G43.011 MIGRAINE WITHOUT AURA, INTRACTABLE, WITH STATUS MIGRAINOSUS: Primary | ICD-10-CM

## 2023-07-31 RX ORDER — NORTRIPTYLINE HYDROCHLORIDE 25 MG/1
CAPSULE ORAL
Qty: 60 CAPSULE | Refills: 5 | Status: SHIPPED | OUTPATIENT
Start: 2023-07-31

## 2023-08-07 ENCOUNTER — OFFICE VISIT (OUTPATIENT)
Age: 53
End: 2023-08-07
Payer: MEDICAID

## 2023-08-07 VITALS
OXYGEN SATURATION: 98 % | HEART RATE: 78 BPM | DIASTOLIC BLOOD PRESSURE: 78 MMHG | TEMPERATURE: 96.4 F | RESPIRATION RATE: 20 BRPM | SYSTOLIC BLOOD PRESSURE: 122 MMHG

## 2023-08-07 DIAGNOSIS — G43.011 MIGRAINE WITHOUT AURA, INTRACTABLE, WITH STATUS MIGRAINOSUS: Primary | ICD-10-CM

## 2023-08-07 PROCEDURE — 99213 OFFICE O/P EST LOW 20 MIN: CPT

## 2023-08-07 RX ORDER — GALCANEZUMAB 120 MG/ML
INJECTION, SOLUTION SUBCUTANEOUS
Qty: 1 ML | Refills: 11 | Status: SHIPPED | OUTPATIENT
Start: 2023-08-07

## 2023-08-07 RX ORDER — UBROGEPANT 100 MG/1
TABLET ORAL
Qty: 16 TABLET | Refills: 11 | Status: SHIPPED | OUTPATIENT
Start: 2023-08-07

## 2023-08-07 ASSESSMENT — ENCOUNTER SYMPTOMS: NAUSEA: 1

## 2023-09-03 DIAGNOSIS — G89.29 CHRONIC LOW BACK PAIN, UNSPECIFIED BACK PAIN LATERALITY, UNSPECIFIED WHETHER SCIATICA PRESENT: Primary | ICD-10-CM

## 2023-09-03 DIAGNOSIS — M54.50 CHRONIC LOW BACK PAIN, UNSPECIFIED BACK PAIN LATERALITY, UNSPECIFIED WHETHER SCIATICA PRESENT: Primary | ICD-10-CM

## 2023-09-05 RX ORDER — GABAPENTIN 600 MG/1
TABLET ORAL
Qty: 90 TABLET | Refills: 2 | Status: SHIPPED | OUTPATIENT
Start: 2023-09-05 | End: 2023-12-04

## 2023-09-15 RX ORDER — ONDANSETRON 4 MG/1
4 TABLET, FILM COATED ORAL EVERY 8 HOURS PRN
COMMUNITY
Start: 2023-06-05 | End: 2023-09-15 | Stop reason: SDUPTHER

## 2023-09-15 RX ORDER — LEVOTHYROXINE SODIUM 0.05 MG/1
50 TABLET ORAL
Qty: 30 TABLET | Refills: 2 | Status: SHIPPED | OUTPATIENT
Start: 2023-09-15

## 2023-09-15 RX ORDER — DICYCLOMINE HYDROCHLORIDE 10 MG/1
10 CAPSULE ORAL 3 TIMES DAILY PRN
Qty: 90 CAPSULE | Refills: 0 | Status: SHIPPED | OUTPATIENT
Start: 2023-09-15

## 2023-09-15 RX ORDER — ROSUVASTATIN CALCIUM 10 MG/1
10 TABLET, COATED ORAL NIGHTLY
Qty: 30 TABLET | Refills: 2 | Status: SHIPPED | OUTPATIENT
Start: 2023-09-15

## 2023-09-15 RX ORDER — ONDANSETRON 4 MG/1
4 TABLET, FILM COATED ORAL EVERY 8 HOURS PRN
Qty: 30 TABLET | Refills: 0 | Status: SHIPPED | OUTPATIENT
Start: 2023-09-15

## 2023-09-15 NOTE — TELEPHONE ENCOUNTER
----- Message from Dudley Cárdenas sent at 9/15/2023  9:11 AM EDT -----  Regarding: Refills  Contact: [phone]  I'm so sad, I have to change PCP. Are you able to send refills for my usual meds for me.    I need:      Bentyl        Zofran       Rosuvastatin        Levothyroxine       Sertraline     Thankyou so much for all you do for my family :)

## 2023-11-02 ENCOUNTER — TELEPHONE (OUTPATIENT)
Age: 53
End: 2023-11-02

## 2023-11-02 NOTE — TELEPHONE ENCOUNTER
RE:Ubrelvy prior authorization request sent to [employer] through [facility]    Status is approved     Key: [id number] - PA Case ID: [id number]    me  Approved today    PA Case: [id number], Status: Approved,     Coverage Starts on: 11/2/2023 12:00:00 AM,     Coverage Ends on: 11/1/2024 12:00:00 AM.    No approval letter at this time    FYI to nurse

## 2023-12-04 ENCOUNTER — OFFICE VISIT (OUTPATIENT)
Age: 53
End: 2023-12-04
Payer: MEDICAID

## 2023-12-04 VITALS
WEIGHT: 159 LBS | TEMPERATURE: 97.2 F | DIASTOLIC BLOOD PRESSURE: 76 MMHG | SYSTOLIC BLOOD PRESSURE: 120 MMHG | OXYGEN SATURATION: 98 % | HEIGHT: 63 IN | HEART RATE: 96 BPM | RESPIRATION RATE: 20 BRPM | BODY MASS INDEX: 28.17 KG/M2

## 2023-12-04 DIAGNOSIS — G89.29 CHRONIC LOW BACK PAIN, UNSPECIFIED BACK PAIN LATERALITY, UNSPECIFIED WHETHER SCIATICA PRESENT: ICD-10-CM

## 2023-12-04 DIAGNOSIS — E78.00 HYPERCHOLESTEREMIA: ICD-10-CM

## 2023-12-04 DIAGNOSIS — E55.9 VITAMIN D DEFICIENCY: ICD-10-CM

## 2023-12-04 DIAGNOSIS — N39.3 URINARY, INCONTINENCE, STRESS FEMALE: ICD-10-CM

## 2023-12-04 DIAGNOSIS — E03.9 ACQUIRED HYPOTHYROIDISM: Primary | ICD-10-CM

## 2023-12-04 DIAGNOSIS — R35.1 NOCTURIA: ICD-10-CM

## 2023-12-04 DIAGNOSIS — Z12.31 ENCOUNTER FOR SCREENING MAMMOGRAM FOR MALIGNANT NEOPLASM OF BREAST: ICD-10-CM

## 2023-12-04 DIAGNOSIS — E03.9 ACQUIRED HYPOTHYROIDISM: ICD-10-CM

## 2023-12-04 DIAGNOSIS — Z79.899 ENCOUNTER FOR LONG-TERM (CURRENT) USE OF MEDICATIONS: ICD-10-CM

## 2023-12-04 DIAGNOSIS — M54.50 CHRONIC LOW BACK PAIN, UNSPECIFIED BACK PAIN LATERALITY, UNSPECIFIED WHETHER SCIATICA PRESENT: ICD-10-CM

## 2023-12-04 DIAGNOSIS — F33.1 MAJOR DEPRESSIVE DISORDER, RECURRENT, MODERATE (HCC): ICD-10-CM

## 2023-12-04 DIAGNOSIS — Z23 NEEDS FLU SHOT: ICD-10-CM

## 2023-12-04 PROCEDURE — 99214 OFFICE O/P EST MOD 30 MIN: CPT | Performed by: FAMILY MEDICINE

## 2023-12-04 PROCEDURE — PBSHW INFLUENZA, FLUCELVAX, (AGE 6 MO+), IM, PF, 0.5 ML: Performed by: FAMILY MEDICINE

## 2023-12-04 PROCEDURE — 90674 CCIIV4 VAC NO PRSV 0.5 ML IM: CPT | Performed by: FAMILY MEDICINE

## 2023-12-04 RX ORDER — GABAPENTIN 600 MG/1
TABLET ORAL
Qty: 90 TABLET | Refills: 2 | Status: SHIPPED | OUTPATIENT
Start: 2023-12-04 | End: 2024-03-03

## 2023-12-04 RX ORDER — LEVOTHYROXINE SODIUM 0.05 MG/1
50 TABLET ORAL
Qty: 30 TABLET | Refills: 2 | Status: SHIPPED | OUTPATIENT
Start: 2023-12-04

## 2023-12-04 RX ORDER — ROSUVASTATIN CALCIUM 10 MG/1
10 TABLET, COATED ORAL NIGHTLY
Qty: 30 TABLET | Refills: 2 | Status: SHIPPED | OUTPATIENT
Start: 2023-12-04

## 2023-12-05 ENCOUNTER — TELEPHONE (OUTPATIENT)
Age: 53
End: 2023-12-05

## 2023-12-05 DIAGNOSIS — B35.3 TINEA PEDIS, UNSPECIFIED LATERALITY: Primary | ICD-10-CM

## 2023-12-05 RX ORDER — CLOTRIMAZOLE AND BETAMETHASONE DIPROPIONATE 10; .64 MG/G; MG/G
CREAM TOPICAL
Qty: 45 G | Refills: 2 | Status: SHIPPED | OUTPATIENT
Start: 2023-12-05

## 2023-12-05 ASSESSMENT — ENCOUNTER SYMPTOMS: BACK PAIN: 1

## 2023-12-05 NOTE — TELEPHONE ENCOUNTER
----- Message from Michaela Devries, [address] sent at 12/5/2023 11:35 AM EST -----  Regarding: FW: Rash on foot  Contact: [phone]    ----- Message -----  From: Sid Rodriguez  Sent: 12/5/2023  11:10 AM EST  To: [facility]y [facility] Clinical Staff  Subject: Rash on foot                                     I forgot to ask about my foot yesterday. I don't know if you can see this well enough to tell. I thought it was athletes foot but I'm not sure . I've tried cream for that but it is not going away.

## 2023-12-06 NOTE — PROGRESS NOTES
Bijan Cárdenas (:  1970) is a 48 y.o. female,Established patient, here for evaluation of the following chief complaint(s):  Hypothyroidism (Follow up)        ASSESSMENT/PLAN:  1. Acquired hypothyroidism  -     TSH; Future  -     T4, Free; Future  -     levothyroxine (SYNTHROID) 50 MCG tablet; Take 1 tablet by mouth every morning (before breakfast), Disp-30 tablet, R-2Normal  2. Vitamin D deficiency  -     Vitamin D 25 Hydroxy; Future  3. Hypercholesteremia  -     Lipid Panel; Future  -     rosuvastatin (CRESTOR) 10 MG tablet; Take 1 tablet by mouth nightly, Disp-30 tablet, R-2Normal  4. Encounter for long-term (current) use of medications  -     Comprehensive Metabolic Panel; Future  -     CBC with Auto Differential; Future  5. Chronic low back pain, unspecified back pain laterality, unspecified whether sciatica present  -     gabapentin (NEURONTIN) 600 MG tablet; TAKE 1 TABLET BY MOUTH THREE TIMES DAILY . DO NOT EXCEED 3 PER 24 HOURS, Disp-90 tablet, R-2Normal  6. Major depressive disorder, recurrent, moderate (HCC)  -     sertraline (ZOLOFT) 50 MG tablet; Take 1 tablet by mouth daily, Disp-30 tablet, R-2Normal  7. Urinary, incontinence, stress female  -     BENY - Juni Mott, Urology, [facility] [place]  8. Nocturia  -     BENY - Juni Mott, Urology, [facility]  9. Encounter for screening mammogram for malignant neoplasm of breast  -     [facility] DIGITAL SCREEN W OR WO CAD BILATERAL; Future  10. Needs flu shot  -     Influenza, FLUCELVAX, (age 10 mo+), IM, Preservative Free, 0.5 mL      No follow-ups on file. SUBJECTIVE/OBJECTIVE:  HPI    Follow-up chronic conditions including hypothyroidism, hypercholesterolemia, vitamin D deficiency, depression. Patient is due for labs. New problem increased urine frequency and some stress incontinence. Patient had right shoulder surgery 2023. She is back to normal activity including work.     Current Outpatient

## 2023-12-08 ENCOUNTER — TELEPHONE (OUTPATIENT)
Age: 53
End: 2023-12-08

## 2023-12-08 NOTE — TELEPHONE ENCOUNTER
Accidentally called patient instead of her  because her number is listed as his mobile.  He needs an appointment with Dr Derrell houston to establish care from  [facility]

## 2023-12-09 LAB
25(OH)D3+25(OH)D2 SERPL-MCNC: 48.7 NG/ML (ref 30–100)
ALBUMIN SERPL-MCNC: 4.3 G/DL (ref 3.8–4.9)
ALBUMIN/GLOB SERPL: 1.7 {RATIO} (ref 1.2–2.2)
ALP SERPL-CCNC: 149 IU/L (ref 44–121)
ALT SERPL-CCNC: 11 IU/L (ref 0–32)
AST SERPL-CCNC: 19 IU/L (ref 0–40)
BASOPHILS # BLD AUTO: 0.1 X10E3/UL (ref 0–0.2)
BASOPHILS NFR BLD AUTO: 1 %
BILIRUB SERPL-MCNC: 0.2 MG/DL (ref 0–1.2)
BUN SERPL-MCNC: 4 MG/DL (ref 6–24)
BUN/CREAT SERPL: 4 (ref 9–23)
CALCIUM SERPL-MCNC: 9.6 MG/DL (ref 8.7–10.2)
CHLORIDE SERPL-SCNC: 101 MMOL/L (ref 96–106)
CHOLEST SERPL-MCNC: 138 MG/DL (ref 100–199)
CO2 SERPL-SCNC: 23 MMOL/L (ref 20–29)
CREAT SERPL-MCNC: 1.09 MG/DL (ref 0.57–1)
EGFRCR SERPLBLD CKD-EPI 2021: 62 ML/MIN/1.73
EOSINOPHIL # BLD AUTO: 0.5 X10E3/UL (ref 0–0.4)
EOSINOPHIL NFR BLD AUTO: 6 %
ERYTHROCYTE [DISTWIDTH] IN BLOOD BY AUTOMATED COUNT: 13.2 % (ref 11.7–15.4)
GLOBULIN SER CALC-MCNC: 2.6 G/DL (ref 1.5–4.5)
GLUCOSE SERPL-MCNC: 129 MG/DL (ref 70–99)
HCT VFR BLD AUTO: 43.1 % (ref 34–46.6)
HDLC SERPL-MCNC: 50 MG/DL
HGB BLD-MCNC: 14.1 G/DL (ref 11.1–15.9)
IMM GRANULOCYTES # BLD AUTO: 0 X10E3/UL (ref 0–0.1)
IMM GRANULOCYTES NFR BLD AUTO: 0 %
IMP & REVIEW OF LAB RESULTS: NORMAL
LDLC SERPL CALC-MCNC: 64 MG/DL (ref 0–99)
LYMPHOCYTES # BLD AUTO: 2.3 X10E3/UL (ref 0.7–3.1)
LYMPHOCYTES NFR BLD AUTO: 25 %
MCH RBC QN AUTO: 32.3 PG (ref 26.6–33)
MCHC RBC AUTO-ENTMCNC: 32.7 G/DL (ref 31.5–35.7)
MCV RBC AUTO: 99 FL (ref 79–97)
MONOCYTES # BLD AUTO: 0.5 X10E3/UL (ref 0.1–0.9)
MONOCYTES NFR BLD AUTO: 6 %
NEUTROPHILS # BLD AUTO: 6 X10E3/UL (ref 1.4–7)
NEUTROPHILS NFR BLD AUTO: 62 %
PLATELET # BLD AUTO: 286 X10E3/UL (ref 150–450)
POTASSIUM SERPL-SCNC: 4.1 MMOL/L (ref 3.5–5.2)
PROT SERPL-MCNC: 6.9 G/DL (ref 6–8.5)
RBC # BLD AUTO: 4.36 X10E6/UL (ref 3.77–5.28)
SODIUM SERPL-SCNC: 139 MMOL/L (ref 134–144)
T4 FREE SERPL-MCNC: 1.07 NG/DL (ref 0.82–1.77)
TRIGL SERPL-MCNC: 136 MG/DL (ref 0–149)
TSH SERPL DL<=0.005 MIU/L-ACNC: 2.28 UIU/ML (ref 0.45–4.5)
VLDLC SERPL CALC-MCNC: 24 MG/DL (ref 5–40)
WBC # BLD AUTO: 9.4 X10E3/UL (ref 3.4–10.8)

## 2023-12-14 DIAGNOSIS — N28.9 ABNORMAL RENAL FUNCTION: Primary | ICD-10-CM

## 2024-01-09 RX ORDER — ONDANSETRON 4 MG/1
4 TABLET, FILM COATED ORAL EVERY 8 HOURS PRN
Qty: 30 TABLET | Refills: 0 | Status: SHIPPED | OUTPATIENT
Start: 2024-01-09

## 2024-02-07 NOTE — PROGRESS NOTES
Identified pt with two pt identifiers(name and ). Chief Complaint   Patient presents with    Nasal Congestion     began last tuesday    Cough    Sore Throat    Ear Pain        There are no preventive care reminders to display for this patient. Wt Readings from Last 3 Encounters:   17 151 lb (68.5 kg)   10/03/17 154 lb 3.2 oz (69.9 kg)   17 160 lb (72.6 kg)     Temp Readings from Last 3 Encounters:   17 98.1 °F (36.7 °C) (Oral)   10/03/17 98.1 °F (36.7 °C) (Oral)   17 98.9 °F (37.2 °C) (Oral)     BP Readings from Last 3 Encounters:   10/03/17 92/67   17 120/72   17 120/72     Pulse Readings from Last 3 Encounters:   17 86   10/03/17 92   17 (!) 115         Learning Assessment:  :     Learning Assessment 2014   PRIMARY LEARNER Patient Patient   HIGHEST LEVEL OF EDUCATION - PRIMARY LEARNER  DID NOT GRADUATE HIGH SCHOOL DID NOT GRADUATE HIGH SCHOOL   BARRIERS PRIMARY LEARNER - NONE   CO-LEARNER CAREGIVER - No   PRIMARY LANGUAGE ENGLISH ENGLISH   LEARNER PREFERENCE PRIMARY DEMONSTRATION DEMONSTRATION   ANSWERED BY patient patient    RELATIONSHIP SELF SELF       Depression Screening:  :     PHQ over the last two weeks 3/6/2017   PHQ Not Done Active Diagnosis of Depression or Bipolar Disorder   Little interest or pleasure in doing things -   Feeling down, depressed or hopeless -   Total Score PHQ 2 -       Fall Risk Assessment:  :     Fall Risk Assessment, last 12 mths 3/6/2017   Able to walk? Yes   Fall in past 12 months? No       Abuse Screening:  :     Abuse Screening Questionnaire 3/6/2017 1/15/2016 2014   Do you ever feel afraid of your partner? N N N   Are you in a relationship with someone who physically or mentally threatens you? N N N   Is it safe for you to go home?  Denilson Miller       Coordination of Care Questionnaire:  :     1) Have you been to an emergency room, urgent care clinic since your last visit? no   Hospitalized since your last visit? no             2) Have you seen or consulted any other health care providers outside of [address] [place] [address] since your last visit? no  (Include any pap smears or colon screenings in this section.)    3) Do you have an Advance Directive on file? no  Are you interested in receiving information about Advance Directives? no    Reviewed record in preparation for visit and have obtained necessary documentation. Medication reconciliation up to date and corrected with patient at this time. PAST MEDICAL HISTORY:  No pertinent past medical history

## 2024-04-02 DIAGNOSIS — G89.29 CHRONIC LOW BACK PAIN, UNSPECIFIED BACK PAIN LATERALITY, UNSPECIFIED WHETHER SCIATICA PRESENT: ICD-10-CM

## 2024-04-02 DIAGNOSIS — E78.00 HYPERCHOLESTEREMIA: ICD-10-CM

## 2024-04-02 DIAGNOSIS — M54.50 CHRONIC LOW BACK PAIN, UNSPECIFIED BACK PAIN LATERALITY, UNSPECIFIED WHETHER SCIATICA PRESENT: ICD-10-CM

## 2024-04-02 RX ORDER — ROSUVASTATIN CALCIUM 10 MG/1
10 TABLET, COATED ORAL NIGHTLY
Qty: 30 TABLET | Refills: 0 | Status: SHIPPED | OUTPATIENT
Start: 2024-04-02

## 2024-04-02 RX ORDER — GABAPENTIN 600 MG/1
TABLET ORAL
Qty: 90 TABLET | Refills: 0 | Status: SHIPPED | OUTPATIENT
Start: 2024-04-02 | End: 2024-05-02

## 2024-05-01 DIAGNOSIS — G89.29 CHRONIC LOW BACK PAIN, UNSPECIFIED BACK PAIN LATERALITY, UNSPECIFIED WHETHER SCIATICA PRESENT: ICD-10-CM

## 2024-05-01 DIAGNOSIS — E78.00 HYPERCHOLESTEREMIA: ICD-10-CM

## 2024-05-01 DIAGNOSIS — E03.9 ACQUIRED HYPOTHYROIDISM: ICD-10-CM

## 2024-05-01 DIAGNOSIS — M54.50 CHRONIC LOW BACK PAIN, UNSPECIFIED BACK PAIN LATERALITY, UNSPECIFIED WHETHER SCIATICA PRESENT: ICD-10-CM

## 2024-05-01 RX ORDER — LEVOTHYROXINE SODIUM 0.05 MG/1
TABLET ORAL
Qty: 30 TABLET | Refills: 5 | Status: SHIPPED | OUTPATIENT
Start: 2024-05-01

## 2024-05-01 RX ORDER — ERGOCALCIFEROL 1.25 MG/1
50000 CAPSULE ORAL
Qty: 4 CAPSULE | Refills: 5 | Status: SHIPPED | OUTPATIENT
Start: 2024-05-01

## 2024-05-02 RX ORDER — GABAPENTIN 600 MG/1
TABLET ORAL
Qty: 90 TABLET | Refills: 0 | Status: SHIPPED | OUTPATIENT
Start: 2024-05-02 | End: 2024-06-10 | Stop reason: SDUPTHER

## 2024-05-02 RX ORDER — ROSUVASTATIN CALCIUM 10 MG/1
10 TABLET, COATED ORAL NIGHTLY
Qty: 30 TABLET | Refills: 0 | Status: SHIPPED | OUTPATIENT
Start: 2024-05-02 | End: 2024-06-10 | Stop reason: SDUPTHER

## 2024-05-11 NOTE — TELEPHONE ENCOUNTER
From: Josse Lopez  To: Parker Buckley NP  Sent: 10/3/2022 9:08 AM EDT  Subject: Headache won't go away    GM,   Hope u are well! My imitrex has not been getting rid of my headache completely so the next day it is back again. It's been like this for a few weeks some days are worse than others. I'm sure it's probably the weather but is there something else we can do or try?? Relpax made my whole body sore and achey,  but it did usually work!    Fallon Shah
UBRELVY PA initiated through Cover my meds key # -  [id number]     Your request has been approved  PA Case: [id number], Status: Approved, Coverage Starts on: 10/21/2022 12:00:00 AM, Coverage Ends on: 10/21/2023 12:00:00 AM
Pt p/w intermittent abd pain, back pain & vomiting x1 week. Saw PMD on Wednesday who prescribed lactulose & miralax for constipation but still no pain relief. -Fever +PO Pt is alert, awake, and appropriate with clear b/l lung sounds. Abd soft & nondistended.  PMHx. of autsim. NKA. IUTD.

## 2024-05-31 ENCOUNTER — NURSE ONLY (OUTPATIENT)
Age: 54
End: 2024-05-31

## 2024-05-31 DIAGNOSIS — N28.9 ABNORMAL RENAL FUNCTION: ICD-10-CM

## 2024-06-01 LAB
BUN SERPL-MCNC: 5 MG/DL (ref 6–24)
BUN/CREAT SERPL: 7 (ref 9–23)
CALCIUM SERPL-MCNC: 9.7 MG/DL (ref 8.7–10.2)
CHLORIDE SERPL-SCNC: 103 MMOL/L (ref 96–106)
CO2 SERPL-SCNC: 28 MMOL/L (ref 20–29)
CREAT SERPL-MCNC: 0.75 MG/DL (ref 0.57–1)
EGFRCR SERPLBLD CKD-EPI 2021: 96 ML/MIN/1.73
GLUCOSE SERPL-MCNC: 86 MG/DL (ref 70–99)
POTASSIUM SERPL-SCNC: 3.9 MMOL/L (ref 3.5–5.2)
SODIUM SERPL-SCNC: 144 MMOL/L (ref 134–144)

## 2024-06-03 ENCOUNTER — TELEPHONE (OUTPATIENT)
Age: 54
End: 2024-06-03

## 2024-06-03 NOTE — TELEPHONE ENCOUNTER
----- Message from Laura Mcdonald MD sent at 6/2/2024  3:19 PM EDT -----  Kidney function is back to normal.  Make sure to continue to to drink plenty of water daily.

## 2024-06-06 ENCOUNTER — HOSPITAL ENCOUNTER (OUTPATIENT)
Facility: HOSPITAL | Age: 54
Discharge: HOME | End: 2024-06-06
Payer: MEDICAID

## 2024-06-06 VITALS — HEIGHT: 63 IN | BODY MASS INDEX: 25.52 KG/M2 | WEIGHT: 144 LBS

## 2024-06-06 DIAGNOSIS — Z12.31 ENCOUNTER FOR SCREENING MAMMOGRAM FOR MALIGNANT NEOPLASM OF BREAST: ICD-10-CM

## 2024-06-06 PROCEDURE — 77063 BREAST TOMOSYNTHESIS BI: CPT

## 2024-06-10 DIAGNOSIS — B35.3 TINEA PEDIS, UNSPECIFIED LATERALITY: ICD-10-CM

## 2024-06-10 DIAGNOSIS — E03.9 ACQUIRED HYPOTHYROIDISM: ICD-10-CM

## 2024-06-10 DIAGNOSIS — E78.00 HYPERCHOLESTEREMIA: ICD-10-CM

## 2024-06-10 DIAGNOSIS — F33.1 MAJOR DEPRESSIVE DISORDER, RECURRENT, MODERATE (HCC): ICD-10-CM

## 2024-06-10 DIAGNOSIS — M54.50 CHRONIC LOW BACK PAIN, UNSPECIFIED BACK PAIN LATERALITY, UNSPECIFIED WHETHER SCIATICA PRESENT: ICD-10-CM

## 2024-06-10 DIAGNOSIS — G89.29 CHRONIC LOW BACK PAIN, UNSPECIFIED BACK PAIN LATERALITY, UNSPECIFIED WHETHER SCIATICA PRESENT: ICD-10-CM

## 2024-06-10 RX ORDER — CLOTRIMAZOLE AND BETAMETHASONE DIPROPIONATE 10; .64 MG/G; MG/G
CREAM TOPICAL
Qty: 45 G | Refills: 2 | Status: SHIPPED | OUTPATIENT
Start: 2024-06-10

## 2024-06-10 RX ORDER — ONDANSETRON 4 MG/1
4 TABLET, FILM COATED ORAL EVERY 8 HOURS PRN
Qty: 30 TABLET | Refills: 0 | Status: SHIPPED | OUTPATIENT
Start: 2024-06-10

## 2024-06-10 RX ORDER — LEVOTHYROXINE SODIUM 0.05 MG/1
TABLET ORAL
Qty: 30 TABLET | Refills: 5 | Status: SHIPPED | OUTPATIENT
Start: 2024-06-10

## 2024-06-10 RX ORDER — ERGOCALCIFEROL 1.25 MG/1
50000 CAPSULE ORAL
Qty: 4 CAPSULE | Refills: 5 | Status: SHIPPED | OUTPATIENT
Start: 2024-06-10

## 2024-06-10 RX ORDER — GABAPENTIN 600 MG/1
TABLET ORAL
Qty: 90 TABLET | Refills: 5 | Status: SHIPPED | OUTPATIENT
Start: 2024-06-10 | End: 2024-12-07

## 2024-06-10 RX ORDER — ROSUVASTATIN CALCIUM 10 MG/1
10 TABLET, COATED ORAL NIGHTLY
Qty: 30 TABLET | Refills: 5 | Status: SHIPPED | OUTPATIENT
Start: 2024-06-10

## 2024-06-10 RX ORDER — DICYCLOMINE HYDROCHLORIDE 10 MG/1
10 CAPSULE ORAL 3 TIMES DAILY PRN
Qty: 90 CAPSULE | Refills: 5 | Status: SHIPPED | OUTPATIENT
Start: 2024-06-10

## 2024-06-21 ENCOUNTER — TELEPHONE (OUTPATIENT)
Age: 54
End: 2024-06-21

## 2024-06-21 NOTE — TELEPHONE ENCOUNTER
----- Message from Cindi Cárdenas sent at 6/21/2024  3:45 PM EDT -----  Regarding: Emgality   Contact: [phone]  Pharmacy says I need a PA. I know it was supposed to be for a year but they do this every so often. :(

## 2024-06-26 ENCOUNTER — HOSPITAL ENCOUNTER (OUTPATIENT)
Facility: HOSPITAL | Age: 54
Discharge: HOME | End: 2024-06-29

## 2024-06-26 DIAGNOSIS — M25.832 ULNAR IMPINGEMENT SYNDROME, LEFT: ICD-10-CM

## 2024-07-06 ENCOUNTER — TELEPHONE (OUTPATIENT)
Age: 54
End: 2024-07-06

## 2024-07-08 ENCOUNTER — TELEPHONE (OUTPATIENT)
Age: 54
End: 2024-07-08

## 2024-07-08 DIAGNOSIS — G43.011 MIGRAINE WITHOUT AURA, INTRACTABLE, WITH STATUS MIGRAINOSUS: Primary | ICD-10-CM

## 2024-07-08 RX ORDER — UBROGEPANT 100 MG/1
TABLET ORAL
Qty: 16 TABLET | Refills: 11 | Status: SHIPPED | OUTPATIENT
Start: 2024-07-08

## 2024-07-08 NOTE — TELEPHONE ENCOUNTER
RE:Ubrelvy 100 mg tablet prior authorization request sent to [employer] via epic    Status is denied     [employer] reviewed your request for UBRELVY 100 MG TABLET for the [facility] member identified above and we denied your request as follows:    because we did not see what we need to approve the amount of the drug you asked for, (Ubrelvy 100 milligram tablet). The health plan has a limit on the amount of this drug, 16 tablets every 30 days. We did not see why you need more than this amount. We cannot approve the amount requested, but you may fill up to the limit    Unless the patient needs more than 16 a month then they are allowed to fill up to the plan quantity limit     Denial letter scanned for media     FYI to nurse

## 2024-07-22 ENCOUNTER — TELEPHONE (OUTPATIENT)
Age: 54
End: 2024-07-22

## 2024-07-22 DIAGNOSIS — G89.29 CHRONIC LOW BACK PAIN, UNSPECIFIED BACK PAIN LATERALITY, UNSPECIFIED WHETHER SCIATICA PRESENT: Primary | ICD-10-CM

## 2024-07-22 DIAGNOSIS — M54.50 CHRONIC LOW BACK PAIN, UNSPECIFIED BACK PAIN LATERALITY, UNSPECIFIED WHETHER SCIATICA PRESENT: Primary | ICD-10-CM

## 2024-08-12 ENCOUNTER — OFFICE VISIT (OUTPATIENT)
Age: 54
End: 2024-08-12
Payer: MEDICAID

## 2024-08-12 VITALS
RESPIRATION RATE: 20 BRPM | SYSTOLIC BLOOD PRESSURE: 124 MMHG | OXYGEN SATURATION: 99 % | HEART RATE: 55 BPM | DIASTOLIC BLOOD PRESSURE: 68 MMHG

## 2024-08-12 DIAGNOSIS — G43.011 MIGRAINE WITHOUT AURA, INTRACTABLE, WITH STATUS MIGRAINOSUS: Primary | ICD-10-CM

## 2024-08-12 PROCEDURE — 99214 OFFICE O/P EST MOD 30 MIN: CPT

## 2024-08-12 RX ORDER — NORTRIPTYLINE HYDROCHLORIDE 25 MG/1
25 CAPSULE ORAL NIGHTLY
Qty: 180 CAPSULE | Refills: 1 | Status: SHIPPED | OUTPATIENT
Start: 2024-08-12

## 2024-08-12 RX ORDER — MELOXICAM 15 MG/1
15 TABLET ORAL DAILY
COMMUNITY
Start: 2024-07-31

## 2024-08-12 RX ORDER — GALCANEZUMAB 120 MG/ML
120 INJECTION, SOLUTION SUBCUTANEOUS
Qty: 1 ML | Refills: 11 | Status: SHIPPED | OUTPATIENT
Start: 2024-08-12

## 2024-08-12 ASSESSMENT — ENCOUNTER SYMPTOMS: NAUSEA: 1

## 2024-08-12 NOTE — PROGRESS NOTES
Migraines- emgality works really well for the first 2 weeks, maybe the next week and last week she notices them     
      Past Surgical History:   Procedure Laterality Date    APPENDECTOMY      CERVICAL FUSION  2006    HYSTERECTOMY (CERVIX STATUS UNKNOWN)  01/2010    LAPAROSCOPY      Adhesions found        Family History   Problem Relation Age of Onset    No Known Problems Mother     Hypertension Father     Breast Cancer Other         not sure of age    Breast Cancer Maternal Aunt 70    Breast Cancer Paternal Aunt        Social History     Socioeconomic History    Marital status:      Spouse name: None    Number of children: None    Years of education: None    Highest education level: None   Tobacco Use    Smoking status: Never    Smokeless tobacco: Never   Substance and Sexual Activity    Alcohol use: Not Currently     Alcohol/week: 0.0 standard drinks of alcohol    Drug use: Yes     Types: Marijuana (Weed)     Social Determinants of Health     Financial Resource Strain: Low Risk  (2/14/2023)    Overall Financial Resource Strain (CARDIA)     Difficulty of Paying Living Expenses: Not hard at all       Review of Systems   Constitutional: Negative.    Gastrointestinal:  Positive for nausea.   Neurological:  Positive for headaches.         Remainder of comprehensive review is negative.     Physical Exam :    /68 (Site: Left Upper Arm, Position: Sitting, Cuff Size: Large Adult)   Pulse 55   Resp 20   SpO2 99%     General: Well defined, nourished, and groomed individual in no acute distress.    Neck: Supple, nontender, no bruits, no pain with resistance to active range of motion.    Musculoskeletal: Extremities revealed no edema and had full range of motion of joints.    Psych: Good mood and bright affect    NEUROLOGICAL EXAMINATION:    Mental Status: Alert and oriented to person, place, and time    Cranial Nerves:    II, III, IV, VI: Visual acuity grossly intact. Visual fields are normal.    Pupils are equal, round, and reactive to light and accommodation.    Extra-ocular movements are full and fluid. V-XII: Hearing

## 2024-08-23 ENCOUNTER — HOSPITAL ENCOUNTER (OUTPATIENT)
Facility: HOSPITAL | Age: 54
End: 2024-08-23
Payer: MEDICAID

## 2024-08-23 DIAGNOSIS — M48.061 SPINAL STENOSIS, LUMBAR REGION, WITHOUT NEUROGENIC CLAUDICATION: ICD-10-CM

## 2024-08-23 DIAGNOSIS — M47.816 LUMBAR SPONDYLOSIS: ICD-10-CM

## 2024-08-23 PROCEDURE — 72148 MRI LUMBAR SPINE W/O DYE: CPT

## 2024-09-07 SDOH — ECONOMIC STABILITY: TRANSPORTATION INSECURITY
IN THE PAST 12 MONTHS, HAS LACK OF TRANSPORTATION KEPT YOU FROM MEETINGS, WORK, OR FROM GETTING THINGS NEEDED FOR DAILY LIVING?: NO

## 2024-09-07 SDOH — ECONOMIC STABILITY: FOOD INSECURITY: WITHIN THE PAST 12 MONTHS, YOU WORRIED THAT YOUR FOOD WOULD RUN OUT BEFORE YOU GOT MONEY TO BUY MORE.: PATIENT DECLINED

## 2024-09-07 SDOH — ECONOMIC STABILITY: FOOD INSECURITY: WITHIN THE PAST 12 MONTHS, THE FOOD YOU BOUGHT JUST DIDN'T LAST AND YOU DIDN'T HAVE MONEY TO GET MORE.: PATIENT DECLINED

## 2024-09-07 SDOH — ECONOMIC STABILITY: INCOME INSECURITY: HOW HARD IS IT FOR YOU TO PAY FOR THE VERY BASICS LIKE FOOD, HOUSING, MEDICAL CARE, AND HEATING?: SOMEWHAT HARD

## 2024-09-09 ENCOUNTER — TELEPHONE (OUTPATIENT)
Age: 54
End: 2024-09-09

## 2024-09-09 ENCOUNTER — OFFICE VISIT (OUTPATIENT)
Age: 54
End: 2024-09-09
Payer: MEDICAID

## 2024-09-09 VITALS
OXYGEN SATURATION: 99 % | WEIGHT: 137.4 LBS | BODY MASS INDEX: 24.34 KG/M2 | HEIGHT: 63 IN | HEART RATE: 94 BPM | DIASTOLIC BLOOD PRESSURE: 72 MMHG | RESPIRATION RATE: 18 BRPM | TEMPERATURE: 97.5 F | SYSTOLIC BLOOD PRESSURE: 112 MMHG

## 2024-09-09 DIAGNOSIS — M51.36 DDD (DEGENERATIVE DISC DISEASE), LUMBAR: ICD-10-CM

## 2024-09-09 DIAGNOSIS — R41.840 LACK OF CONCENTRATION: Primary | ICD-10-CM

## 2024-09-09 PROCEDURE — 99213 OFFICE O/P EST LOW 20 MIN: CPT | Performed by: FAMILY MEDICINE

## 2024-09-09 SDOH — ECONOMIC STABILITY: FOOD INSECURITY

## 2024-09-09 SDOH — ECONOMIC STABILITY: INCOME INSECURITY

## 2024-09-09 ASSESSMENT — PATIENT HEALTH QUESTIONNAIRE - PHQ9
SUM OF ALL RESPONSES TO PHQ QUESTIONS 1-9: 0
6. FEELING BAD ABOUT YOURSELF - OR THAT YOU ARE A FAILURE OR HAVE LET YOURSELF OR YOUR FAMILY DOWN: NOT AT ALL
3. TROUBLE FALLING OR STAYING ASLEEP: NOT AT ALL
SUM OF ALL RESPONSES TO PHQ QUESTIONS 1-9: 0
8. MOVING OR SPEAKING SO SLOWLY THAT OTHER PEOPLE COULD HAVE NOTICED. OR THE OPPOSITE, BEING SO FIGETY OR RESTLESS THAT YOU HAVE BEEN MOVING AROUND A LOT MORE THAN USUAL: NOT AT ALL
SUM OF ALL RESPONSES TO PHQ9 QUESTIONS 1 & 2: 0
5. POOR APPETITE OR OVEREATING: NOT AT ALL
9. THOUGHTS THAT YOU WOULD BE BETTER OFF DEAD, OR OF HURTING YOURSELF: NOT AT ALL
SUM OF ALL RESPONSES TO PHQ QUESTIONS 1-9: 0
SUM OF ALL RESPONSES TO PHQ QUESTIONS 1-9: 0
7. TROUBLE CONCENTRATING ON THINGS, SUCH AS READING THE NEWSPAPER OR WATCHING TELEVISION: NOT AT ALL
1. LITTLE INTEREST OR PLEASURE IN DOING THINGS: NOT AT ALL
4. FEELING TIRED OR HAVING LITTLE ENERGY: NOT AT ALL
2. FEELING DOWN, DEPRESSED OR HOPELESS: NOT AT ALL

## 2024-10-10 NOTE — PATIENT INSTRUCTIONS
Neck Pain: Care Instructions  Your Care Instructions    You can have neck pain anywhere from the bottom of your head to the top of your shoulders. It can spread to the upper back or arms. Injuries, painting a ceiling, sleeping with your neck twisted, staying in one position for too long, and many other activities can cause neck pain. Most neck pain gets better with home care. Your doctor may recommend medicine to relieve pain or relax your muscles. He or she may suggest exercise and physical therapy to increase flexibility and relieve stress. You may need to wear a special (cervical) collar to support your neck for a day or two. Follow-up care is a key part of your treatment and safety. Be sure to make and go to all appointments, and call your doctor if you are having problems. It's also a good idea to know your test results and keep a list of the medicines you take. How can you care for yourself at home? · Try using a heating pad on a low or medium setting for 15 to 20 minutes every 2 or 3 hours. Try a warm shower in place of one session with the heating pad. · You can also try an ice pack for 10 to 15 minutes every 2 to 3 hours. Put a thin cloth between the ice and your skin. · Take pain medicines exactly as directed. ? If the doctor gave you a prescription medicine for pain, take it as prescribed. ? If you are not taking a prescription pain medicine, ask your doctor if you can take an over-the-counter medicine. · If your doctor recommends a cervical collar, wear it exactly as directed. When should you call for help? Call your doctor now or seek immediate medical care if:    · You have new or worsening numbness in your arms, buttocks or legs.     · You have new or worsening weakness in your arms or legs.  (This could make it hard to stand up.)     · You lose control of your bladder or bowels.    Watch closely for changes in your health, and be sure to contact your doctor if:    · Your neck pain is getting worse.     · You are not getting better after 1 week.     · You do not get better as expected. Where can you learn more? Go to http://smooth-connie.info/. Enter [id number] in the search box to learn more about \"Neck Pain: Care Instructions. \"  Current as of: September 20, 2018  Content Version: 11.9  © 8907-3599 [employer]. Care instructions adapted under license by [employer] (which disclaims liability or warranty for this information). If you have questions about a medical condition or this instruction, always ask your healthcare professional. [place] [address] any warranty or liability for your use of this information. Red (Hem/Onc)/Red S (Risk for Sepsis)

## 2024-10-22 ENCOUNTER — TELEPHONE (OUTPATIENT)
Age: 54
End: 2024-10-22

## 2024-11-20 ENCOUNTER — TELEPHONE (OUTPATIENT)
Age: 54
End: 2024-11-20

## 2024-11-20 NOTE — TELEPHONE ENCOUNTER
RE:Ubrelvy 100 mg tablet renewal request sent to [employer] via cmm    (Key: [id number])    PA Case ID #: [id number]    Status approved     Coverage Starts on: 11/20/2024     Coverage Ends on: 11/20/2025      Authorization Expiration Date: 11/19/2025     Approval letter uploaded to media     Patient notified via my chart

## 2024-12-12 DIAGNOSIS — G89.29 CHRONIC LOW BACK PAIN, UNSPECIFIED BACK PAIN LATERALITY, UNSPECIFIED WHETHER SCIATICA PRESENT: ICD-10-CM

## 2024-12-12 DIAGNOSIS — M54.50 CHRONIC LOW BACK PAIN, UNSPECIFIED BACK PAIN LATERALITY, UNSPECIFIED WHETHER SCIATICA PRESENT: ICD-10-CM

## 2024-12-12 RX ORDER — GABAPENTIN 600 MG/1
TABLET ORAL
Qty: 90 TABLET | Refills: 5 | Status: SHIPPED | OUTPATIENT
Start: 2024-12-12 | End: 2025-06-10

## 2024-12-23 ENCOUNTER — TELEPHONE (OUTPATIENT)
Age: 54
End: 2024-12-23

## 2024-12-23 NOTE — TELEPHONE ENCOUNTER
RE:Emgality 120 mg auto injector pa renewal completed via phone    Case ID [id number]    Approved     12/23/24-12/23/25    Approval letter uploaded to [place]    Pharmacy will need to use code [id number] as it will reject per [facility] pharmacy department.    Called [facility] pharmacy Emgality claim approved.    FYI to nurse

## 2025-01-23 DIAGNOSIS — F33.1 MAJOR DEPRESSIVE DISORDER, RECURRENT, MODERATE (HCC): ICD-10-CM

## 2025-02-12 ENCOUNTER — TELEMEDICINE (OUTPATIENT)
Age: 55
End: 2025-02-12
Payer: MEDICAID

## 2025-02-12 DIAGNOSIS — G43.011 MIGRAINE WITHOUT AURA, INTRACTABLE, WITH STATUS MIGRAINOSUS: Primary | ICD-10-CM

## 2025-02-12 PROCEDURE — 99214 OFFICE O/P EST MOD 30 MIN: CPT

## 2025-02-12 RX ORDER — GALCANEZUMAB 120 MG/ML
120 INJECTION, SOLUTION SUBCUTANEOUS
Qty: 3 ML | Refills: 3 | Status: ACTIVE | OUTPATIENT
Start: 2025-02-12

## 2025-02-12 RX ORDER — NORTRIPTYLINE HYDROCHLORIDE 50 MG/1
50 CAPSULE ORAL NIGHTLY
Qty: 90 CAPSULE | Refills: 3 | Status: SHIPPED | OUTPATIENT
Start: 2025-02-12

## 2025-02-15 DIAGNOSIS — E78.00 HYPERCHOLESTEREMIA: ICD-10-CM

## 2025-02-17 RX ORDER — ROSUVASTATIN CALCIUM 10 MG/1
10 TABLET, COATED ORAL NIGHTLY
Qty: 30 TABLET | Refills: 0 | Status: SHIPPED | OUTPATIENT
Start: 2025-02-17

## 2025-02-23 DIAGNOSIS — E03.9 ACQUIRED HYPOTHYROIDISM: ICD-10-CM

## 2025-02-24 RX ORDER — LEVOTHYROXINE SODIUM 50 UG/1
TABLET ORAL
Qty: 30 TABLET | Refills: 0 | Status: SHIPPED | OUTPATIENT
Start: 2025-02-24

## 2025-04-12 DIAGNOSIS — E78.00 HYPERCHOLESTEREMIA: ICD-10-CM

## 2025-04-13 RX ORDER — ROSUVASTATIN CALCIUM 10 MG/1
10 TABLET, COATED ORAL NIGHTLY
Qty: 30 TABLET | Refills: 0 | Status: SHIPPED | OUTPATIENT
Start: 2025-04-13 | End: 2025-05-28

## 2025-04-14 NOTE — TELEPHONE ENCOUNTER
Left Vm letting Pt know that Rx had been sent over to pharmacy and to call the office to schedule OV

## 2025-04-16 ENCOUNTER — HOSPITAL ENCOUNTER (EMERGENCY)
Facility: HOSPITAL | Age: 55
Discharge: HOME | End: 2025-04-16
Attending: EMERGENCY MEDICINE
Payer: MEDICAID

## 2025-04-16 VITALS
WEIGHT: 135 LBS | TEMPERATURE: 98.4 F | SYSTOLIC BLOOD PRESSURE: 105 MMHG | RESPIRATION RATE: 16 BRPM | DIASTOLIC BLOOD PRESSURE: 69 MMHG | OXYGEN SATURATION: 96 % | HEART RATE: 84 BPM | BODY MASS INDEX: 24.84 KG/M2 | HEIGHT: 62 IN

## 2025-04-16 DIAGNOSIS — M54.6 ACUTE BILATERAL THORACIC BACK PAIN: Primary | ICD-10-CM

## 2025-04-16 LAB
APPEARANCE UR: CLEAR
BACTERIA URNS QL MICRO: NEGATIVE /HPF
BILIRUB UR QL: NEGATIVE
COLOR UR: NORMAL
EPITH CASTS URNS QL MICRO: NORMAL /LPF
GLUCOSE UR STRIP.AUTO-MCNC: NEGATIVE MG/DL
HGB UR QL STRIP: NEGATIVE
KETONES UR QL STRIP.AUTO: NEGATIVE MG/DL
LEUKOCYTE ESTERASE UR QL STRIP.AUTO: NEGATIVE
NITRITE UR QL STRIP.AUTO: NEGATIVE
PH UR STRIP: 6 (ref 5–8)
PROT UR STRIP-MCNC: NEGATIVE MG/DL
RBC #/AREA URNS HPF: NORMAL /HPF (ref 0–5)
SP GR UR REFRACTOMETRY: 1.01 (ref 1–1.03)
UROBILINOGEN UR QL STRIP.AUTO: 0.2 EU/DL (ref 0.2–1)
WBC URNS QL MICRO: NORMAL /HPF (ref 0–4)

## 2025-04-16 PROCEDURE — 6360000002 HC RX W HCPCS: Performed by: EMERGENCY MEDICINE

## 2025-04-16 PROCEDURE — 99284 EMERGENCY DEPT VISIT MOD MDM: CPT

## 2025-04-16 PROCEDURE — 81001 URINALYSIS AUTO W/SCOPE: CPT

## 2025-04-16 PROCEDURE — 96372 THER/PROPH/DIAG INJ SC/IM: CPT

## 2025-04-16 PROCEDURE — 6370000000 HC RX 637 (ALT 250 FOR IP): Performed by: EMERGENCY MEDICINE

## 2025-04-16 RX ORDER — LIDOCAINE 50 MG/G
1 PATCH TOPICAL DAILY
Qty: 10 PATCH | Refills: 0 | Status: SHIPPED | OUTPATIENT
Start: 2025-04-16 | End: 2025-04-26

## 2025-04-16 RX ORDER — OXYCODONE AND ACETAMINOPHEN 5; 325 MG/1; MG/1
1 TABLET ORAL EVERY 6 HOURS PRN
Qty: 8 TABLET | Refills: 0 | Status: SHIPPED | OUTPATIENT
Start: 2025-04-16 | End: 2025-04-19

## 2025-04-16 RX ORDER — CYCLOBENZAPRINE HCL 10 MG
10 TABLET ORAL 3 TIMES DAILY PRN
Qty: 12 TABLET | Refills: 0 | Status: SHIPPED | OUTPATIENT
Start: 2025-04-16

## 2025-04-16 RX ORDER — DIAZEPAM 5 MG/1
5 TABLET ORAL ONCE
Status: COMPLETED | OUTPATIENT
Start: 2025-04-16 | End: 2025-04-16

## 2025-04-16 RX ORDER — KETOROLAC TROMETHAMINE 30 MG/ML
30 INJECTION, SOLUTION INTRAMUSCULAR; INTRAVENOUS ONCE
Status: COMPLETED | OUTPATIENT
Start: 2025-04-16 | End: 2025-04-16

## 2025-04-16 RX ORDER — OXYCODONE AND ACETAMINOPHEN 5; 325 MG/1; MG/1
1 TABLET ORAL
Refills: 0 | Status: COMPLETED | OUTPATIENT
Start: 2025-04-16 | End: 2025-04-16

## 2025-04-16 RX ADMIN — DIAZEPAM 5 MG: 5 TABLET ORAL at 13:50

## 2025-04-16 RX ADMIN — OXYCODONE AND ACETAMINOPHEN 1 TABLET: 5; 325 TABLET ORAL at 14:25

## 2025-04-16 RX ADMIN — KETOROLAC TROMETHAMINE 30 MG: 30 INJECTION, SOLUTION INTRAMUSCULAR at 13:50

## 2025-04-16 ASSESSMENT — PAIN DESCRIPTION - ORIENTATION: ORIENTATION: MID

## 2025-04-16 ASSESSMENT — PAIN SCALES - GENERAL
PAINLEVEL_OUTOF10: 6
PAINLEVEL_OUTOF10: 2
PAINLEVEL_OUTOF10: 6
PAINLEVEL_OUTOF10: 6

## 2025-04-16 ASSESSMENT — PAIN DESCRIPTION - LOCATION: LOCATION: BACK

## 2025-04-16 ASSESSMENT — PAIN DESCRIPTION - DESCRIPTORS: DESCRIPTORS: ACHING;SORE

## 2025-04-16 NOTE — ED PROVIDER NOTES
Tobacco Use    Smoking status: Never    Smokeless tobacco: Never   Substance and Sexual Activity    Alcohol use: Not Currently     Alcohol/week: 0.0 standard drinks of alcohol    Drug use: Yes     Types: Marijuana (Weed)     Social Drivers of Health     Financial Resource Strain: Low Risk  (9/9/2024)    Overall Financial Resource Strain (CARDIA)     Difficulty of Paying Living Expenses: Not hard at all   Food Insecurity: No Food Insecurity (9/9/2024)    Hunger Vital Sign     Worried About Running Out of Food in the Last Year: Never true     Ran Out of Food in the Last Year: Never true   Transportation Needs: Unknown (9/9/2024)    PRAPARE - Transportation     Lack of Transportation (Non-Medical): No   Housing Stability: Unknown (9/7/2024)    Housing Stability Vital Sign     Homeless in the Last Year: No           PHYSICAL EXAM    (up to 7 for level 4, 8 or more for level 5)     ED Triage Vitals [04/16/25 1315]   BP Systolic BP Percentile Diastolic BP Percentile Temp Temp Source Pulse Respirations SpO2   120/71 -- -- 98.4 °F (36.9 °C) Oral 84 16 98 %      Height Weight - Scale         1.575 m (5' 2\") 61.2 kg (135 lb)             Body mass index is 24.69 kg/m².    Physical Exam  Vitals and nursing note reviewed.   Constitutional:       Appearance: Normal appearance.   HENT:      Head: Normocephalic and atraumatic.      Mouth/Throat:      Mouth: Mucous membranes are moist.   Eyes:      Conjunctiva/sclera: Conjunctivae normal.   Cardiovascular:      Rate and Rhythm: Normal rate.   Pulmonary:      Effort: Pulmonary effort is normal.   Abdominal:      General: There is no distension.   Musculoskeletal:         General: No swelling or deformity.      Cervical back: No rigidity.      Comments: Moderate mid thoracic tenderness.  Reproducible pain with movement.   Skin:     General: Skin is warm and dry.   Neurological:      Mental Status: She is alert.   Psychiatric:         Mood and Affect: Mood normal.         DIAGNOSTIC

## 2025-04-16 NOTE — ED NOTES
The patient was discharged home by provider in stable condition. The patient is alert and oriented, in no respiratory distress. The patient's diagnosis, condition and treatment were explained. The patient expressed understanding and denies any questions or concerns at this time. Patient leaves treatment area ambulatory with all personal belongings.      Pt picked up by daughter.

## 2025-04-16 NOTE — ED TRIAGE NOTES
Pt wheeled to treatment area c/o atraumatic mid back pain x 2 days. Pt reports chronic pain from back injuries and sees pain management. Pt states she has been to physical therapy without relief.

## 2025-05-28 DIAGNOSIS — E78.00 HYPERCHOLESTEREMIA: ICD-10-CM

## 2025-05-28 RX ORDER — ROSUVASTATIN CALCIUM 10 MG/1
TABLET, COATED ORAL
Qty: 30 TABLET | Refills: 0 | Status: SHIPPED | OUTPATIENT
Start: 2025-05-28

## 2025-05-28 NOTE — TELEPHONE ENCOUNTER
Pls call pt. She is OVERDUE for fasting labs.  Last done 12/2023. I will refill Crestor only 30 days.  Need OV and labs.

## 2025-06-19 NOTE — PROGRESS NOTES
Cindi Cárdenas (:  1970) is a 55 y.o. female,Established patient, here for evaluation of the following chief complaint(s):  Hyperlipidemia (Patient is here for a Follow up visit), Depression, and Lab Collection (Fasting)        ASSESSMENT/PLAN:  1. Hypercholesteremia  -     Lipid Panel; Future  -     rosuvastatin (CRESTOR) 10 MG tablet; Take 1 tablet by mouth daily, Disp-90 tablet, R-1Normal  2. Acquired hypothyroidism  -     TSH; Future  -     T4, Free; Future  -     levothyroxine (SYNTHROID) 50 MCG tablet; TAKE 1 TABLET BY MOUTH ONCE DAILY IN THE MORNING BEFORE BREAKFAST, Disp-30 tablet, R-0Normal  3. Major depressive disorder, recurrent, moderate (HCC)  -     sertraline (ZOLOFT) 50 MG tablet; Take 1 tablet by mouth daily, Disp-90 tablet, R-1Normal  4. Chronic low back pain, unspecified back pain laterality, unspecified whether sciatica present  -     gabapentin (NEURONTIN) 600 MG tablet; TAKE 1 TABLET BY MOUTH THREE TIMES DAILY . DO NOT EXCEED 3 PER 24 HOURS, Disp-90 tablet, R-5Normal  5. Vitamin D deficiency  -     Vitamin D 25 Hydroxy; Future  6. Encounter for long-term (current) use of medications  -     Comprehensive Metabolic Panel; Future  -     CBC; Future  7. Screening for HIV (human immunodeficiency virus)  -     HIV 1/2 Ag/Ab, 4TH Generation,W Rflx Confirm; Future  8. Need for hepatitis C screening test  -     Hepatitis C Antibody; Future      No follow-ups on file.      SUBJECTIVE/OBJECTIVE:  HPI  Follow-up hypercholesterolemia, hypothyroidism, depression, chronic low back pain.  Patient is due for fasting labs.  She has been treated by [facility] pain clinic Dr. Tomlinson.  Has benefited from epidural steroid injections.  She is taking meloxicam daily for pain.  Also remains on gabapentin 600 mg 3 times daily.      Current Outpatient Medications   Medication Sig Dispense Refill    rosuvastatin (CRESTOR) 10 MG tablet Take 1 tablet by mouth daily 90 tablet 1    sertraline (ZOLOFT) 50 MG tablet

## 2025-06-20 ENCOUNTER — LAB (OUTPATIENT)
Age: 55
End: 2025-06-20
Payer: MEDICAID

## 2025-06-20 ENCOUNTER — OFFICE VISIT (OUTPATIENT)
Age: 55
End: 2025-06-20
Payer: MEDICAID

## 2025-06-20 VITALS
RESPIRATION RATE: 20 BRPM | HEIGHT: 63 IN | DIASTOLIC BLOOD PRESSURE: 62 MMHG | SYSTOLIC BLOOD PRESSURE: 94 MMHG | WEIGHT: 133 LBS | TEMPERATURE: 98.1 F | OXYGEN SATURATION: 98 % | HEART RATE: 91 BPM | BODY MASS INDEX: 23.57 KG/M2

## 2025-06-20 DIAGNOSIS — Z79.899 ENCOUNTER FOR LONG-TERM (CURRENT) USE OF MEDICATIONS: ICD-10-CM

## 2025-06-20 DIAGNOSIS — E55.9 VITAMIN D DEFICIENCY: ICD-10-CM

## 2025-06-20 DIAGNOSIS — G89.29 CHRONIC LOW BACK PAIN, UNSPECIFIED BACK PAIN LATERALITY, UNSPECIFIED WHETHER SCIATICA PRESENT: ICD-10-CM

## 2025-06-20 DIAGNOSIS — Z11.59 NEED FOR HEPATITIS C SCREENING TEST: ICD-10-CM

## 2025-06-20 DIAGNOSIS — E03.9 ACQUIRED HYPOTHYROIDISM: ICD-10-CM

## 2025-06-20 DIAGNOSIS — M54.50 CHRONIC LOW BACK PAIN, UNSPECIFIED BACK PAIN LATERALITY, UNSPECIFIED WHETHER SCIATICA PRESENT: ICD-10-CM

## 2025-06-20 DIAGNOSIS — F33.1 MAJOR DEPRESSIVE DISORDER, RECURRENT, MODERATE (HCC): ICD-10-CM

## 2025-06-20 DIAGNOSIS — E78.00 HYPERCHOLESTEREMIA: Primary | ICD-10-CM

## 2025-06-20 DIAGNOSIS — Z11.4 SCREENING FOR HIV (HUMAN IMMUNODEFICIENCY VIRUS): ICD-10-CM

## 2025-06-20 DIAGNOSIS — E78.00 HYPERCHOLESTEREMIA: ICD-10-CM

## 2025-06-20 PROCEDURE — 99214 OFFICE O/P EST MOD 30 MIN: CPT | Performed by: FAMILY MEDICINE

## 2025-06-20 RX ORDER — GABAPENTIN 600 MG/1
TABLET ORAL
Qty: 90 TABLET | Refills: 5 | Status: SHIPPED | OUTPATIENT
Start: 2025-06-20 | End: 2025-12-17

## 2025-06-20 RX ORDER — ROSUVASTATIN CALCIUM 10 MG/1
10 TABLET, COATED ORAL DAILY
Qty: 90 TABLET | Refills: 1 | Status: SHIPPED | OUTPATIENT
Start: 2025-06-20

## 2025-06-20 RX ORDER — LEVOTHYROXINE SODIUM 50 UG/1
TABLET ORAL
Qty: 30 TABLET | Refills: 0 | Status: SHIPPED | OUTPATIENT
Start: 2025-06-20

## 2025-06-20 SDOH — ECONOMIC STABILITY: FOOD INSECURITY: WITHIN THE PAST 12 MONTHS, THE FOOD YOU BOUGHT JUST DIDN'T LAST AND YOU DIDN'T HAVE MONEY TO GET MORE.: NEVER TRUE

## 2025-06-20 SDOH — ECONOMIC STABILITY: FOOD INSECURITY: WITHIN THE PAST 12 MONTHS, YOU WORRIED THAT YOUR FOOD WOULD RUN OUT BEFORE YOU GOT MONEY TO BUY MORE.: NEVER TRUE

## 2025-06-20 ASSESSMENT — PATIENT HEALTH QUESTIONNAIRE - PHQ9
9. THOUGHTS THAT YOU WOULD BE BETTER OFF DEAD, OR OF HURTING YOURSELF: NOT AT ALL
SUM OF ALL RESPONSES TO PHQ QUESTIONS 1-9: 0
SUM OF ALL RESPONSES TO PHQ QUESTIONS 1-9: 0
10. IF YOU CHECKED OFF ANY PROBLEMS, HOW DIFFICULT HAVE THESE PROBLEMS MADE IT FOR YOU TO DO YOUR WORK, TAKE CARE OF THINGS AT HOME, OR GET ALONG WITH OTHER PEOPLE: NOT DIFFICULT AT ALL
5. POOR APPETITE OR OVEREATING: NOT AT ALL
3. TROUBLE FALLING OR STAYING ASLEEP: NOT AT ALL
SUM OF ALL RESPONSES TO PHQ QUESTIONS 1-9: 0
8. MOVING OR SPEAKING SO SLOWLY THAT OTHER PEOPLE COULD HAVE NOTICED. OR THE OPPOSITE, BEING SO FIGETY OR RESTLESS THAT YOU HAVE BEEN MOVING AROUND A LOT MORE THAN USUAL: NOT AT ALL
6. FEELING BAD ABOUT YOURSELF - OR THAT YOU ARE A FAILURE OR HAVE LET YOURSELF OR YOUR FAMILY DOWN: NOT AT ALL
7. TROUBLE CONCENTRATING ON THINGS, SUCH AS READING THE NEWSPAPER OR WATCHING TELEVISION: NOT AT ALL
1. LITTLE INTEREST OR PLEASURE IN DOING THINGS: NOT AT ALL
2. FEELING DOWN, DEPRESSED OR HOPELESS: NOT AT ALL
4. FEELING TIRED OR HAVING LITTLE ENERGY: NOT AT ALL
SUM OF ALL RESPONSES TO PHQ QUESTIONS 1-9: 0

## 2025-06-20 NOTE — PROGRESS NOTES
Identified pt with two pt identifiers(name and )    Chief Complaint   Patient presents with    Hyperlipidemia     Patient is here for a Follow up visit    Depression    Lab Collection     Fasting        Health Maintenance Due   Topic    HIV screen     Hepatitis C screen     Hepatitis B vaccine (1 of 3 - 19+ 3-dose series)    Shingles vaccine (1 of 2)    Pneumococcal 50+ years Vaccine (1 of 1 - PCV)    COVID-19 Vaccine ( season)    Lipids        Wt Readings from Last 3 Encounters:   25 60.3 kg (133 lb)   25 61.2 kg (135 lb)   24 62.3 kg (137 lb 6.4 oz)     Temp Readings from Last 3 Encounters:   25 98.1 °F (36.7 °C) (Temporal)   25 98.4 °F (36.9 °C) (Oral)   24 97.5 °F (36.4 °C)     BP Readings from Last 3 Encounters:   25 94/62   25 105/69   24 112/72     Pulse Readings from Last 3 Encounters:   25 91   25 84   24 94           Depression Screening:  :         2025    11:10 AM 2024    11:04 AM 2023     9:00 AM 2022     8:00 AM   PHQ-9 Questionaire   Little interest or pleasure in doing things 0 0 1 0   Feeling down, depressed, or hopeless 0 0 1 0   Trouble falling or staying asleep, or sleeping too much 0 0     Feeling tired or having little energy 0 0     Poor appetite or overeating 0 0     Feeling bad about yourself - or that you are a failure or have let yourself or your family down 0 0     Trouble concentrating on things, such as reading the newspaper or watching television 0 0     Moving or speaking so slowly that other people could have noticed. Or the opposite - being so fidgety or restless that you have been moving around a lot more than usual 0 0     Thoughts that you would be better off dead, or of hurting yourself in some way 0 0     PHQ-9 Total Score 0 0 2 0   If you checked off any problems, how difficult have these problems made it for you to do your work, take care of things at home, or get along

## 2025-06-21 LAB
25(OH)D3+25(OH)D2 SERPL-MCNC: 50.3 NG/ML (ref 30–100)
ALBUMIN SERPL-MCNC: 4.5 G/DL (ref 3.8–4.9)
ALP SERPL-CCNC: 117 IU/L (ref 44–121)
ALT SERPL-CCNC: 19 IU/L (ref 0–32)
AST SERPL-CCNC: 23 IU/L (ref 0–40)
BILIRUB SERPL-MCNC: 0.5 MG/DL (ref 0–1.2)
BUN SERPL-MCNC: 9 MG/DL (ref 6–24)
BUN/CREAT SERPL: 10 (ref 9–23)
CALCIUM SERPL-MCNC: 9.9 MG/DL (ref 8.7–10.2)
CHLORIDE SERPL-SCNC: 103 MMOL/L (ref 96–106)
CHOLEST SERPL-MCNC: 159 MG/DL (ref 100–199)
CO2 SERPL-SCNC: 25 MMOL/L (ref 20–29)
CREAT SERPL-MCNC: 0.94 MG/DL (ref 0.57–1)
EGFRCR SERPLBLD CKD-EPI 2021: 73 ML/MIN/1.73
ERYTHROCYTE [DISTWIDTH] IN BLOOD BY AUTOMATED COUNT: 12.6 % (ref 11.7–15.4)
GLOBULIN SER CALC-MCNC: 2.6 G/DL (ref 1.5–4.5)
GLUCOSE SERPL-MCNC: 77 MG/DL (ref 70–99)
HCT VFR BLD AUTO: 42.6 % (ref 34–46.6)
HCV IGG SERPL QL IA: NON REACTIVE
HDLC SERPL-MCNC: 63 MG/DL
HGB BLD-MCNC: 14.2 G/DL (ref 11.1–15.9)
HIV 1+2 AB+HIV1 P24 AG SERPL QL IA: NON REACTIVE
IMP & REVIEW OF LAB RESULTS: NORMAL
LDLC SERPL CALC-MCNC: 79 MG/DL (ref 0–99)
MCH RBC QN AUTO: 33.6 PG (ref 26.6–33)
MCHC RBC AUTO-ENTMCNC: 33.3 G/DL (ref 31.5–35.7)
MCV RBC AUTO: 101 FL (ref 79–97)
PLATELET # BLD AUTO: 256 X10E3/UL (ref 150–450)
POTASSIUM SERPL-SCNC: 4.4 MMOL/L (ref 3.5–5.2)
PROT SERPL-MCNC: 7.1 G/DL (ref 6–8.5)
RBC # BLD AUTO: 4.22 X10E6/UL (ref 3.77–5.28)
SODIUM SERPL-SCNC: 141 MMOL/L (ref 134–144)
T4 FREE SERPL-MCNC: 1.05 NG/DL (ref 0.82–1.77)
TRIGL SERPL-MCNC: 90 MG/DL (ref 0–149)
TSH SERPL DL<=0.005 MIU/L-ACNC: 1.75 UIU/ML (ref 0.45–4.5)
VLDLC SERPL CALC-MCNC: 17 MG/DL (ref 5–40)
WBC # BLD AUTO: 10.1 X10E3/UL (ref 3.4–10.8)

## 2025-06-22 ENCOUNTER — RESULTS FOLLOW-UP (OUTPATIENT)
Age: 55
End: 2025-06-22

## 2025-06-26 NOTE — PROGRESS NOTES
Migraines- she does really well with the emgality   She does start to get them more frequent around the next injection   The [place] she takes on the onset and it does work fairly quickly   She was waiting to use them and she was told not to and it works really well for her
Yesica Leonardo is a 48 y.o. female who presents with the following  Chief Complaint   Patient presents with    Follow-up     Migraines        HPI  Patient is here today for migraine follow-up. Patient was last seen July 20, 2022 by Arley Horvath at which time she was established on [facility] for rescue therapy. She was doing well with this combination. Today the patient states that she is still doing very well and that she is having about 5 or 6 headaches a month that come typically right before she is due for her next injection. She states that they are located on her forehead with associated nausea. Not severe in nature. Theadore Ramus typically knocks them out before they progress. She states that they feel pressure or thumping in nature. She has tried [employer] Furl in the past and this did not work as well for her. She has also tried Relpax which she had side effects of sore muscles with. She has tried Imitrex in the past as well. Allergies   Allergen Reactions    Latex Rash    Vancomycin Swelling    Erythromycin Nausea And Vomiting       Current Outpatient Medications   Medication Sig Dispense Refill    Ubrogepant (UBRELVY) 100 MG TABS TAKE 1 TABLET BY MOUTH AT HEADACHE ONSET. MAY REPEAT IN 2 HOURS AS NEEDED. DO NOT EXCEED 2 TABLETS PER 24 HOURS 16 tablet 11    nortriptyline (PAMELOR) 25 MG capsule TAKE 1 TO 2 CAPSULES BY MOUTH IN THE EVENING AT BEDTIME 60 capsule 5    diclofenac sodium (VOLTAREN) 1 % GEL Apply 2 g topically 4 times daily      dicyclomine (BENTYL) 10 MG capsule as needed      ergocalciferol (ERGOCALCIFEROL) 1.25 MG (33136 UT) capsule Take 1 capsule by mouth every 7 days      gabapentin (NEURONTIN) 600 MG tablet Take 1 tablet by mouth nightly.       levothyroxine (SYNTHROID) 50 MCG tablet Take 1 tablet by mouth every morning (before breakfast)      rosuvastatin (CRESTOR) 10 MG tablet TAKE 1 TABLET BY MOUTH NIGHTLY      EMGALITY 120 MG/ML SOAJ INJECT 1 SYRINGE SUBCUTANEOUSLY ONCE
show

## 2025-07-16 DIAGNOSIS — E03.9 ACQUIRED HYPOTHYROIDISM: ICD-10-CM

## 2025-07-16 DIAGNOSIS — G43.011 MIGRAINE WITHOUT AURA, INTRACTABLE, WITH STATUS MIGRAINOSUS: ICD-10-CM

## 2025-07-16 RX ORDER — LEVOTHYROXINE SODIUM 50 UG/1
TABLET ORAL
Qty: 90 TABLET | Refills: 0 | Status: SHIPPED | OUTPATIENT
Start: 2025-07-16

## 2025-07-16 RX ORDER — UBROGEPANT 100 MG/1
TABLET ORAL
Qty: 16 TABLET | Refills: 0 | Status: ACTIVE | OUTPATIENT
Start: 2025-07-16

## (undated) DEVICE — STRAP,POSITIONING,KNEE/BODY,FOAM,4X60": Brand: MEDLINE

## (undated) DEVICE — BIPOLAR FORCEPS CORD: Brand: VALLEYLAB

## (undated) DEVICE — HAND I-LF: Brand: MEDLINE INDUSTRIES, INC.

## (undated) DEVICE — GOWN,SIRUS,NONRNF,SETINSLV,2XL,18/CS: Brand: MEDLINE

## (undated) DEVICE — SOL IRRIGATION INJ NACL 0.9% 500ML BTL

## (undated) DEVICE — ZIMMER® STERILE DISPOSABLE TOURNIQUET CUFF WITH PROTECTIVE SLEEVE AND PLC, DUAL PORT, SINGLE BLADDER, 18 IN. (46 CM)

## (undated) DEVICE — DRAPE,REIN 53X77,STERILE: Brand: MEDLINE

## (undated) DEVICE — SKIN PREP TRAY W/CHG: Brand: MEDLINE INDUSTRIES, INC.

## (undated) DEVICE — DRAPE,U/ SHT,SPLIT,PLAS,STERIL: Brand: MEDLINE

## (undated) DEVICE — STERILE POLYISOPRENE POWDER-FREE SURGICAL GLOVES WITH EMOLLIENT COATING: Brand: PROTEXIS

## (undated) DEVICE — TOWEL,OR,DSP,ST,BLUE,STD,2/PK,40PK/CS: Brand: MEDLINE

## (undated) DEVICE — 1010 S-DRAPE TOWEL DRAPE 10/BX: Brand: STERI-DRAPE™

## (undated) DEVICE — BNDG ELAS HK LOOP 3X5YD NS -- MATRIX

## (undated) DEVICE — SUTURE VCRL SZ 3-0 L27IN ABSRB UD L26MM SH 1/2 CIR J416H

## (undated) DEVICE — COVER LT HNDL PLAS RIG 1 PER PK

## (undated) DEVICE — STERILE POLYISOPRENE POWDER-FREE SURGICAL GLOVES: Brand: PROTEXIS

## (undated) DEVICE — SUTURE MCRYL SZ 3-0 L27IN ABSRB UD L19MM PS-2 3/8 CIR PRIM Y427H

## (undated) DEVICE — INFECTION CONTROL KIT SYS